# Patient Record
Sex: MALE | Race: OTHER
[De-identification: names, ages, dates, MRNs, and addresses within clinical notes are randomized per-mention and may not be internally consistent; named-entity substitution may affect disease eponyms.]

---

## 2017-05-11 ENCOUNTER — HOSPITAL ENCOUNTER (INPATIENT)
Dept: HOSPITAL 74 - JER | Age: 42
LOS: 11 days | Discharge: NURSING, REHAB OR LONG-TERM CARE | DRG: 710 | End: 2017-05-22
Attending: FAMILY MEDICINE | Admitting: FAMILY MEDICINE
Payer: COMMERCIAL

## 2017-05-11 VITALS — BODY MASS INDEX: 34.7 KG/M2

## 2017-05-11 DIAGNOSIS — Z71.3: ICD-10-CM

## 2017-05-11 DIAGNOSIS — G82.20: ICD-10-CM

## 2017-05-11 DIAGNOSIS — I10: ICD-10-CM

## 2017-05-11 DIAGNOSIS — E87.2: ICD-10-CM

## 2017-05-11 DIAGNOSIS — L89.154: ICD-10-CM

## 2017-05-11 DIAGNOSIS — E11.9: ICD-10-CM

## 2017-05-11 DIAGNOSIS — A41.9: Primary | ICD-10-CM

## 2017-05-11 DIAGNOSIS — E87.1: ICD-10-CM

## 2017-05-11 DIAGNOSIS — E66.9: ICD-10-CM

## 2017-05-11 DIAGNOSIS — M46.28: ICD-10-CM

## 2017-05-11 DIAGNOSIS — G82.50: ICD-10-CM

## 2017-05-11 DIAGNOSIS — M81.8: ICD-10-CM

## 2017-05-11 LAB
ALBUMIN SERPL-MCNC: 2.3 G/DL (ref 3.4–5)
ALP SERPL-CCNC: 122 U/L (ref 45–117)
ALT SERPL-CCNC: 8 U/L (ref 12–78)
ANION GAP SERPL CALC-SCNC: 15 MMOL/L (ref 8–16)
APPEARANCE UR: CLEAR
APTT BLD: 35.8 SECONDS (ref 26.9–34.4)
AST SERPL-CCNC: 28 U/L (ref 15–37)
BASOPHILS # BLD: 0 % (ref 0–2)
BILIRUB SERPL-MCNC: 1.1 MG/DL (ref 0.2–1)
BILIRUB UR STRIP.AUTO-MCNC: NEGATIVE MG/DL
CALCIUM SERPL-MCNC: 7.9 MG/DL (ref 8.5–10.1)
CO2 SERPL-SCNC: 22 MMOL/L (ref 21–32)
COCKROFT - GAULT: 151.11
COLOR UR: (no result)
CREAT SERPL-MCNC: 0.8 MG/DL (ref 0.7–1.3)
DEPRECATED RDW RBC AUTO: 15.4 % (ref 11.9–15.9)
EOSINOPHIL # BLD: 0 % (ref 0–4.5)
GLUCOSE SERPL-MCNC: 86 MG/DL (ref 74–106)
INR BLD: 1.43 (ref 0.82–1.09)
KETONES UR QL STRIP: NEGATIVE
LEUKOCYTE ESTERASE UR QL STRIP.AUTO: NEGATIVE
MCH RBC QN AUTO: 23.3 PG (ref 25.7–33.7)
MCHC RBC AUTO-ENTMCNC: 31.9 G/DL (ref 32–35.9)
MCV RBC: 73.1 FL (ref 80–96)
NEUTROPHILS # BLD: 59 % (ref 42.8–82.8)
NITRITE UR QL STRIP: NEGATIVE
PH BLDV: 7.38 [PH] (ref 7.32–7.42)
PH UR: 7 [PH] (ref 5–8)
PLATELET # BLD AUTO: 437 K/MM3 (ref 134–434)
PLATELET # BLD EST: ADEQUATE 10*3/UL
PMV BLD: 6.7 FL (ref 7.5–11.1)
PROT SERPL-MCNC: 7.1 G/DL (ref 6.4–8.2)
PROT UR QL STRIP: NEGATIVE
PROT UR QL STRIP: NEGATIVE
PT PNL PPP: 15.9 SEC (ref 9.98–11.88)
RBC # UR STRIP: NEGATIVE /UL
SAO2 % BLDV: 22 MEQ/L (ref 19–25)
SP GR UR: <= 1.005 (ref 1–1.02)
TROPONIN I SERPL-MCNC: < 0.02 NG/ML (ref 0–0.05)
UROBILINOGEN UR STRIP-MCNC: NEGATIVE E.U./DL (ref 0.2–1)
WBC # BLD AUTO: 41.4 K/MM3 (ref 4–10)

## 2017-05-11 PROCEDURE — G0480 DRUG TEST DEF 1-7 CLASSES: HCPCS

## 2017-05-11 PROCEDURE — C1751 CATH, INF, PER/CENT/MIDLINE: HCPCS

## 2017-05-11 PROCEDURE — A9503 TC99M MEDRONATE: HCPCS

## 2017-05-11 RX ADMIN — ACETAMINOPHEN PRN MG: 325 TABLET ORAL at 21:21

## 2017-05-11 RX ADMIN — VANCOMYCIN HYDROCHLORIDE SCH MLS/HR: 1 INJECTION, POWDER, LYOPHILIZED, FOR SOLUTION INTRAVENOUS at 18:41

## 2017-05-11 RX ADMIN — LEVOFLOXACIN ONE MLS/HR: 5 INJECTION, SOLUTION INTRAVENOUS at 15:23

## 2017-05-11 RX ADMIN — LEVOFLOXACIN ONE: 5 INJECTION, SOLUTION INTRAVENOUS at 15:47

## 2017-05-11 RX ADMIN — PIPERACILLIN SODIUM,TAZOBACTAM SODIUM SCH MLS/HR: 3; .375 INJECTION, POWDER, FOR SOLUTION INTRAVENOUS at 18:41

## 2017-05-11 RX ADMIN — SODIUM CHLORIDE SCH MLS/HR: 9 INJECTION, SOLUTION INTRAVENOUS at 18:42

## 2017-05-11 NOTE — PN
Teaching Attending Note


Name of Resident: Stacy Lawson


ATTENDING PHYSICIAN STATEMENT





I saw and evaluated the patient.


I reviewed the resident's note and discussed the case with the resident.


I agree with the resident's findings and plan as documented.








SUBJECTIVE:








OBJECTIVE:








ASSESSMENT AND PLAN:





Infected R ischial ulcer


Sepsis secondary to infected ulcer


Possible quinolone allergy





Await c/s


Empiric zosyn/ vancomycin


Surgical evaluation

## 2017-05-11 NOTE — EKG
Test Reason : 

Blood Pressure : ***/*** mmHG

Vent. Rate : 111 BPM     Atrial Rate : 111 BPM

   P-R Int : 138 ms          QRS Dur : 084 ms

    QT Int : 316 ms       P-R-T Axes : 051 117 046 degrees

   QTc Int : 429 ms

 

SINUS TACHYCARDIA

RIGHT AXIS DEVIATION

RIGHT VENTRICULAR HYPERTROPHY

NONSPECIFIC ST ABNORMALITY

ABNORMAL ECG

NO PREVIOUS ECGS AVAILABLE

Confirmed by GELA GALAVIZ, JESSICA ([id number]) on 5/11/2017 4:45:40 PM

 

Referred By:             Confirmed By:JESSICA REN MD

## 2017-05-11 NOTE — PDOC
History of Present Illness





<Hortencia Lanier - Last Filed: 05/11/17 15:25>





- History of Present Illness


Initial Comments: 


05/11/17 10:40


- History of Present Illness


Initial Comments: 





05/11/17 10:39


Patient is a 41 year old male with a quadriplegia, DM, and recurrent UTIs(

recent UTI 2 weeks ago) who presents to the ED sent in from [facility] with 

complaint of fever. As per NH, the patient was found to have a fever of 102.8 F 

and was given 650 mg of Tylenol at 08:45 AM. Patient presents to the ED with 

complaint of fever and is found to have 102.4 F in the ED. Patient reports 

nausea and occ dizziness as if the room is spinning. 


He denies chills, SOB, cp, vomiting, diarrhea, constipation or abdominal pain. 

He denies dysuria, frequency or urgency. 


PCP - Dr. Ramirez





<Hortencia Lanier - Last Filed: 05/11/17 10:39>





05/11/17 11:43








<Fariha Greene - Last Filed: 05/13/17 22:54>





- General


Chief Complaint: SIRS, Suspected/Possible


Stated Complaint: SEPSIS


Time Seen by Provider: 05/11/17 10:23





Past History





<Hortencia Lanier - Last Filed: 05/11/17 15:25>





- Past Medical History


Diabetes: Yes





- Psycho/Social/Smoking Cessation Hx


Anxiety: No


Suicidal Ideation: No


Smoking History: Never smoked


Have you smoked in the past 12 months: No


Information on smoking cessation initiated: No


Hx Alcohol Use: No


Drug/Substance Use Hx: No


Substance Use Type: None





<Fariha Greene - Last Filed: 05/13/17 22:54>





- Past Medical History


Allergies/Adverse Reactions: 


 Allergies











Allergy/AdvReac Type Severity Reaction Status Date / Time


 


levofloxacin Allergy Unknown  Verified 05/11/17 17:53











Home Medications: 


Ambulatory Orders





Acetaminophen [Tylenol] 650 mg PO Q6H PRN 05/11/17 


Alendronate Na [Fosamax] 70 mg PO WEEKLY 05/11/17 


Ammonium Lactate Lotion [Lac-Hydrin 12% Lotion -] 1 applic TP ASDIR 05/11/17 


Calcium 250Mg/Vit-D 125 Units [Oscal 250 mg+D -] 1 combo PO DAILY 05/11/17 











*Physical Exam





- Vital Signs


 Last Vital Signs











Temp Pulse Resp BP Pulse Ox


 


 102.4 F H  110 H  22   160/106   99 


 


 05/11/17 10:32  05/11/17 10:32  05/11/17 10:32  05/11/17 10:32  05/11/17 10:32














<Hortencia Lanier - Last Filed: 05/11/17 15:25>





- Vital Signs


 Last Vital Signs











Temp Pulse Resp BP Pulse Ox


 


 102.4 F H  110 H  22   160/106   99 


 


 05/11/17 10:32  05/11/17 10:32  05/11/17 10:32  05/11/17 10:32  05/11/17 10:32














- Physical Exam


Comments: 


05/11/17 10:37


Physical exam





 Last Vital Signs











Temp Pulse Resp BP Pulse Ox


 


 102.4 F H  110 H  22   160/106   99 


 


 05/11/17 10:32  05/11/17 10:32  05/11/17 10:32  05/11/17 10:32  05/11/17 10:32














GENERAL: The patient is awake, alert, and fully oriented, and in no


apparent distress.


HEAD: Normal with no signs of trauma.


EYES:  sclera anicteric, conjunctiva are normal.


ENT: Moist mucous membranes.


NECK: Normal range of motion, supple 


LUNGS: Breath sounds equal, clear to auscultation bilaterally.  No


wheezes, and no crackles.


HEART: Regular rate and rhythm, normal S1 and S2 without murmur, rub


or gallop.


ABDOMEN: Soft, nontender, normoactive bowel sounds.  No guarding, no


rebound.  No masses appreciated.


EXTREMITIES: dorsalis pedis pulses are present and full in the lower 

extremities bilaterally


NEUROLOGICAL: Quadriplegic, with some movement of his arms


PSYCH: Normal mood, normal affect.


SKIN: Warm, Dry, 





























<Fariha Greene - Last Filed: 05/13/17 22:54>





ED Treatment Course





- LABORATORY


CBC & Chemistry Diagram: 


 05/11/17 13:18





 05/11/17 11:16





<Hortencia Lanier - Last Filed: 05/11/17 15:25>





- LABORATORY


CBC & Chemistry Diagram: 


 05/13/17 06:00





 05/13/17 06:00





- RADIOLOGY


Radiology Studies Ordered: 














 Category Date Time Status


 


 CHEST X-RAY PORTABLE* [RAD] Stat Radiology  05/11/17 10:35 Ordered














<Fariha Greene - Last Filed: 05/13/17 22:54>





Medical Decision Making





- Medical Decision Making


05/11/17 15:03


Microblogged Dr. Mane of ID at 13:52 with no response. 


Dr. Mane was contacted again at 14:32 via microblog without any response. 


A call was placed to Dr. Mane at his service at 14:38, awaiting a call back.





05/11/17 15:25


Case discussed with Dr. Vyas.





<Hortencia Lanier - Last Filed: 05/11/17 15:25>





- Medical Decision Making


05/11/17 10:39





Fever/sepsis of uncertain etiology, although 2 weeks ago he reportedly had a 

urinary tract infection





05/11/17 11:09


EKG


Sinus tachycardia, with a rate of 111


Right axis deviation, 117


Normal AV and IV conduction time


Normal QTC


Diffuse nonspecific ST-T wave abnormalities are noted





There is no old EKG available for comparison at this time





05/11/17 13:48


 Laboratory Results - last 24 hr











  05/11/17 05/11/17 05/11/17





  11:16 11:16 11:16


 


WBC  Cancelled  


 


Corrected WBC (auto)  Cancelled  


 


RBC  Cancelled  


 


Hgb  Cancelled  


 


Hct  Cancelled  


 


MCV  Cancelled  


 


MCHC  Cancelled  


 


RDW  Cancelled  


 


Plt Count  Cancelled  


 


MPV  Cancelled  


 


Neutrophils %  Cancelled  


 


Lymphocytes %  Cancelled  


 


Monocytes %  Cancelled  


 


Eosinophils %  Cancelled  


 


Basophils %  Cancelled  


 


Differential Comment  Cancelled  


 


Smudge Cells  Cancelled  


 


Platelet Estimate  Cancelled  


 


Platelet Comment  Cancelled  


 


RBC Morphology  Cancelled  


 


INR   1.43 H 


 


PTT (Actin FS)   35.8 H 


 


VBG pH   


 


POC VBG pCO2   


 


POC VBG pO2   


 


Mixed VBG HCO3   


 


Sodium    129 L


 


Potassium    4.2


 


Chloride    92 L


 


Carbon Dioxide    22


 


Anion Gap    15


 


BUN    7


 


Creatinine    0.8


 


Creat Clearance w eGFR    > 60


 


Random Glucose    86


 


Lactic Acid   


 


Calcium    7.9 L


 


Total Bilirubin    1.1 H


 


AST    28


 


ALT    8 L


 


Alkaline Phosphatase    122 H


 


Creatine Kinase    157


 


Creatine Kinase Index    < 0.7


 


CK-MB (CK-2)    < 1.000


 


CK-MB (CK-2) Rel Index   


 


Troponin I    < 0.02


 


Total Protein    7.1


 


Albumin    2.3 L


 


Blood Type   


 


Antibody Screen   














  05/11/17 05/11/17 05/11/17





  11:16 11:16 11:16


 


WBC   


 


Corrected WBC (auto)   


 


RBC   


 


Hgb   


 


Hct   


 


MCV   


 


MCHC   


 


RDW   


 


Plt Count   


 


MPV   


 


Neutrophils %   


 


Lymphocytes %   


 


Monocytes %   


 


Eosinophils %   


 


Basophils %   


 


Differential Comment   


 


Smudge Cells   


 


Platelet Estimate   


 


Platelet Comment   


 


RBC Morphology   


 


INR   


 


PTT (Actin FS)   


 


VBG pH   


 


POC VBG pCO2   


 


POC VBG pO2   


 


Mixed VBG HCO3   


 


Sodium   


 


Potassium   


 


Chloride   


 


Carbon Dioxide   


 


Anion Gap   


 


BUN   


 


Creatinine   


 


Creat Clearance w eGFR   


 


Random Glucose   


 


Lactic Acid  4.528 H*  


 


Calcium   


 


Total Bilirubin   


 


AST   


 


ALT   


 


Alkaline Phosphatase   


 


Creatine Kinase   


 


Creatine Kinase Index   


 


CK-MB (CK-2)   


 


CK-MB (CK-2) Rel Index    Cancelled


 


Troponin I   


 


Total Protein   


 


Albumin   


 


Blood Type   O POSITIVE 


 


Antibody Screen   Negative 














  05/11/17 05/11/17





  12:45 13:18


 


WBC   41.4 H*


 


Corrected WBC (auto)  


 


RBC   4.42


 


Hgb   10.3 L


 


Hct   32.3 L


 


MCV   73.1 L


 


MCHC   31.9 L


 


RDW   15.4


 


Plt Count   437 H


 


MPV   6.7 L


 


Neutrophils %   Y


 


Lymphocytes %   Y


 


Monocytes %  


 


Eosinophils %  


 


Basophils %  


 


Differential Comment  


 


Smudge Cells  


 


Platelet Estimate  


 


Platelet Comment  


 


RBC Morphology  


 


INR  


 


PTT (Actin FS)  


 


VBG pH  7.38 


 


POC VBG pCO2  37.8 L 


 


POC VBG pO2  41.5 


 


Mixed VBG HCO3  22.0 


 


Sodium  


 


Potassium  


 


Chloride  


 


Carbon Dioxide  


 


Anion Gap  


 


BUN  


 


Creatinine  


 


Creat Clearance w eGFR  


 


Random Glucose  


 


Lactic Acid  


 


Calcium  


 


Total Bilirubin  


 


AST  


 


ALT  


 


Alkaline Phosphatase  


 


Creatine Kinase  


 


Creatine Kinase Index  


 


CK-MB (CK-2)  


 


CK-MB (CK-2) Rel Index  


 


Troponin I  


 


Total Protein  


 


Albumin  


 


Blood Type  


 


Antibody Screen  














UA pending-straight catheter for UA-had recent UTI 


I do not have any recent culture results





Will start with Levaquin and Zosyn-case discussed with Dr. Ramirez-will admit


Will consult Dr. Conde ID








Repeat Lactic acid 3.7 (decreasing with hydration)





05/11/17 15:00


Catheter UA-negative











05/11/17 15:28


Case and all results discussed with Dr. Vyas-agrees with antibiotic 

coverage to start


Will also check CT scan of the abdomen, to make sure no intra-abdominal source





05/11/17 15:41


Patient had an ALLERGIC reaction to Levaquin as soon as it started going in (he 

has no history of any prior ALLERGIES)


He was given 50 of Benadryl and Solu-Medrol 125 mg and is starting to improve





The patient was turned, and there is a decubitus on his right posterior hip 

area (Ischeal area), which is draining


There is another healing decubitus which is not draining. wound culture done





Levaquin stopped


Patient will start the vancomycin and Zosyn








ID paged again





05/11/17 15:45


ID at bedside








CT abd/pelv essentially neg for acute pathology








<Fariha Greene - Last Filed: 05/13/17 22:54>





*DC/Admit/Observation/Transfer





<Hortencia Lanier - Last Filed: 05/11/17 15:25>





- Discharge Dispostion


Admit: Yes





<Fariha Greene - Last Filed: 05/13/17 22:54>


Diagnosis at time of Disposition: 


 Sepsis





- Referrals
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
## 2017-05-12 LAB
ALBUMIN SERPL-MCNC: 2.2 G/DL (ref 3.4–5)
ALP SERPL-CCNC: 99 U/L (ref 45–117)
ALT SERPL-CCNC: 7 U/L (ref 12–78)
ANION GAP SERPL CALC-SCNC: 11 MMOL/L (ref 8–16)
AST SERPL-CCNC: 15 U/L (ref 15–37)
BILIRUB SERPL-MCNC: 0.4 MG/DL (ref 0.2–1)
CALCIUM SERPL-MCNC: 8.1 MG/DL (ref 8.5–10.1)
CO2 SERPL-SCNC: 26 MMOL/L (ref 21–32)
COCKROFT - GAULT: 311.78
CREAT SERPL-MCNC: 0.4 MG/DL (ref 0.7–1.3)
DEPRECATED RDW RBC AUTO: 15.7 % (ref 11.9–15.9)
GLUCOSE SERPL-MCNC: 161 MG/DL (ref 74–106)
MCH RBC QN AUTO: 22.9 PG (ref 25.7–33.7)
MCHC RBC AUTO-ENTMCNC: 31.5 G/DL (ref 32–35.9)
MCV RBC: 72.5 FL (ref 80–96)
NEUTROPHILS # BLD: 83 % (ref 42.8–82.8)
PLATELET # BLD AUTO: 446 K/MM3 (ref 134–434)
PLATELET # BLD EST: (no result) 10*3/UL
PMV BLD: 6.5 FL (ref 7.5–11.1)
PROT SERPL-MCNC: 6.8 G/DL (ref 6.4–8.2)
WBC # BLD AUTO: 22.3 K/MM3 (ref 4–10)

## 2017-05-12 RX ADMIN — SODIUM CHLORIDE SCH MLS/HR: 9 INJECTION, SOLUTION INTRAVENOUS at 16:53

## 2017-05-12 RX ADMIN — PIPERACILLIN SODIUM,TAZOBACTAM SODIUM SCH MLS/HR: 3; .375 INJECTION, POWDER, FOR SOLUTION INTRAVENOUS at 10:31

## 2017-05-12 RX ADMIN — PIPERACILLIN SODIUM,TAZOBACTAM SODIUM SCH MLS/HR: 3; .375 INJECTION, POWDER, FOR SOLUTION INTRAVENOUS at 16:59

## 2017-05-12 RX ADMIN — VANCOMYCIN HYDROCHLORIDE SCH MLS/HR: 1 INJECTION, POWDER, LYOPHILIZED, FOR SOLUTION INTRAVENOUS at 05:16

## 2017-05-12 RX ADMIN — PIPERACILLIN SODIUM,TAZOBACTAM SODIUM SCH MLS/HR: 3; .375 INJECTION, POWDER, FOR SOLUTION INTRAVENOUS at 01:17

## 2017-05-12 RX ADMIN — VANCOMYCIN HYDROCHLORIDE SCH MLS/HR: 1 INJECTION, POWDER, LYOPHILIZED, FOR SOLUTION INTRAVENOUS at 16:59

## 2017-05-12 NOTE — PN
Teaching Attending Note


Name of Resident: Stacy Lawson


ATTENDING PHYSICIAN STATEMENT





I saw and evaluated the patient.


I reviewed the resident's note and discussed the case with the resident.


I agree with the resident's findings and plan as documented.








SUBJECTIVE:








OBJECTIVE:








ASSESSMENT AND PLAN:





Sepsis


Infected R ischial ulcer


Fever/ leukocytosis-improved





Pending c/s, empiric zosyn/ vancomycin


Surgical evaluation

## 2017-05-12 NOTE — CONSULT
Consult


Consult Specialty:: Surgery


Reason for Consultation:: Right ischial/sacral wound





- History of Present Illness


History of Present Illness: 


41 male with PMHx of paraplegia sent to ER from Nursing facility for fevers


Noted to have elevated WBC and Right ischial wound


States he has had this chronic wound for quite some time without healing








- History Source


History Provided By: Patient, Medical Record





- Past Medical History


CNS: Yes: Other (quadriplegia)


Renal/: Yes: UTI


Musculoskeletal: Yes: Other (osteoporosis)





- Alcohol/Substance Use


Hx Alcohol Use: No





- Smoking History


Smoking history: Never smoked


Have you smoked in the past 12 months: No





Home Medications





- Allergies


Allergies/Adverse Reactions: 


 Allergies











Allergy/AdvReac Type Severity Reaction Status Date / Time


 


levofloxacin Allergy Unknown  Verified 05/11/17 17:53














- Home Medications


Home Medications: 


Ambulatory Orders





Acetaminophen [Tylenol] 650 mg PO Q6H PRN 05/11/17 


Alendronate Na [Fosamax] 70 mg PO WEEKLY 05/11/17 


Ammonium Lactate Lotion [Lac-Hydrin 12% Lotion -] 1 applic TP ASDIR 05/11/17 


Calcium 250Mg/Vit-D 125 Units [Oscal 250 mg+D -] 1 combo PO DAILY 05/11/17 











Family Disease History





- Family Disease History


Family History: Unremarkable





Review of Systems





- Review of Systems


Constitutional: reports: Fever


Cardiovascular: denies: Chest Pain


Respiratory: denies: Cough


Gastrointestinal: denies: Abdominal Pain


Pain Intensity: 0





Physical Exam


Vital Signs: 


 Vital Signs











Temperature  98.2 F   05/12/17 14:00


 


Pulse Rate  82   05/12/17 14:00


 


Respiratory Rate  18   05/12/17 14:00


 


Blood Pressure  101/56   05/12/17 14:00


 


O2 Sat by Pulse Oximetry (%)  98   05/12/17 10:00











Constitutional: Yes: Calm


Neck: Yes: Supple


Cardiovascular: Yes: WNL


Respiratory: Yes: Regular


Gastrointestinal: Yes: Soft.  No: Distention, Tenderness


Integumentary: Yes: Other (Right ischium/sacrum- Chronic ulcer. Unable to 

determine total size, however the opening is approximatelt 2cm x 2cm. 

Underlying bone palpable. + Reddish, foul smelling discharge. No erythema. 

Chronic skin changes.)


Neurological: Yes: Alert, Oriented


Labs: 


 CBC, BMP





 05/12/17 10:15 





 05/12/17 10:15 











Imaging





- Results


Cat Scan: Report Reviewed, Image Reviewed





Problem List





- Problems


(1) Paraplegia following spinal cord injury


Code(s): G82.20 - PARAPLEGIA, UNSPECIFIED





(2) Decubitus ulcer of right ischial area


Code(s): L89.319 - PRESSURE ULCER OF RIGHT BUTTOCK, UNSPECIFIED STAGE   

Qualifiers: 


     Pressure ulcer stage: unstageable        Qualified Code(s): L89.310 - 

Pressure ulcer of right buttock, unstageable  








Assessment/Plan


41 male with unstageable right ischial chronic ulcer


+ Drainage


Ulcer is chronic and extends to the bone


Due to complexity of the wound would recommend plastic surgery evaluation 


May require extensive debridement and possible reconstruction to fully heal the 

ulcer


Antibiotics
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
## 2017-05-12 NOTE — CONSULT
Consult


Consult Specialty:: Nephrology


Reason for Consultation:: lactic acidosis and hyponatremia





- History of Present Illness


Chief Complaint: sent in from NH with fever


History of Present Illness: 


Pt is a 41 year old male with pmhx of DM, quadriplegia, and recurrent UTI who 

was sent in from the NH for fever. He was found to have a fever of 102.8. He 

was also febrile in the ER. He was found to have elevated lactic acid and was 

also found to be hyponatremic so I was called to evaluate him. He was admitted 

for sepsis and treated with antibiotics. The sodium improved with saline. He 

says he feels much better today. He is tolerating diet. He denies chills. He 

denies dysuria or hematuria. 





- History Source


History Provided By: Patient, Medical Record





- Past Medical History


CNS: Yes: Other (quadriplegia)


Renal/: Yes: UTI


Musculoskeletal: Yes: Other (osteoporosis)





- Alcohol/Substance Use


Hx Alcohol Use: No





- Smoking History


Smoking history: Never smoked


Have you smoked in the past 12 months: No





Home Medications





- Allergies


Allergies/Adverse Reactions: 


 Allergies











Allergy/AdvReac Type Severity Reaction Status Date / Time


 


levofloxacin Allergy Unknown  Verified 05/11/17 17:53














- Home Medications


Home Medications: 


Ambulatory Orders





Acetaminophen [Tylenol] 650 mg PO Q6H PRN 05/11/17 


Alendronate Na [Fosamax] 70 mg PO WEEKLY 05/11/17 


Ammonium Lactate Lotion [Lac-Hydrin 12% Lotion -] 1 applic TP ASDIR 05/11/17 


Calcium 250Mg/Vit-D 125 Units [Oscal 250 mg+D -] 1 combo PO DAILY 05/11/17 











Family Disease History





- Family Disease History


Family History: Denies





Review of Systems





- Review of Systems


Constitutional: reports: Chills, Fever


Eyes: reports: No Symptoms


HENT: reports: No Symptoms


Neck: reports: No Symptoms


Cardiovascular: reports: No Symptoms


Respiratory: reports: No Symptoms


Gastrointestinal: reports: No Symptoms


Genitourinary: reports: No Symptoms


Integumentary: reports: No Symptoms


Neurological: reports: Pre-Existing Deficit





Physical Exam


Vital Signs: 


 Vital Signs











Temperature  98.2 F   05/12/17 14:00


 


Pulse Rate  82   05/12/17 14:00


 


Respiratory Rate  18   05/12/17 14:00


 


Blood Pressure  101/56   05/12/17 14:00


 


O2 Sat by Pulse Oximetry (%)  98   05/12/17 10:00











Constitutional: Yes: Calm


Eyes: Yes: Conjunctiva Clear


HENT: Yes: Atraumatic


Neck: Yes: Supple


Cardiovascular: Yes: S1, S2


Respiratory: Yes: CTA Bilaterally


Gastrointestinal: Yes: Soft, Abdomen, Obese


Renal/: Yes: Incontinence


Musculoskeletal: Yes: Muscle Weakness


Edema: No


Neurological: Yes: Oriented, Pre-Existing Deficit


Psychiatric: Yes: Oriented


Labs: 


 CBC, BMP





 05/12/17 10:15 





 05/12/17 10:15 





 Laboratory Tests











  05/11/17 05/11/17 05/11/17





  11:16 11:16 12:45


 


WBC   


 


Hgb   


 


Plt Count   


 


VBG pH    7.38


 


POC VBG pCO2    37.8 L


 


POC VBG pO2    41.5


 


Mixed VBG HCO3    22.0


 


Sodium  129 L  


 


Potassium  4.2  


 


Chloride   


 


Carbon Dioxide  22  


 


Anion Gap   


 


BUN   


 


Creatinine   


 


Creat Clearance w eGFR   


 


Lactic Acid   4.528 H* 


 


Creatine Kinase  157  


 


Albumin  2.3 L  














  05/11/17 05/11/17 05/12/17





  13:18 13:42 10:15


 


WBC  41.4 H*   22.3 H D


 


Hgb    10.5 L


 


Plt Count    446 H


 


VBG pH   


 


POC VBG pCO2   


 


POC VBG pO2   


 


Mixed VBG HCO3   


 


Sodium   


 


Potassium   


 


Chloride   


 


Carbon Dioxide   


 


Anion Gap   


 


BUN   


 


Creatinine   


 


Creat Clearance w eGFR   


 


Lactic Acid   3.730 H* 


 


Creatine Kinase   


 


Albumin   














  05/12/17





  10:15


 


WBC 


 


Hgb 


 


Plt Count 


 


VBG pH 


 


POC VBG pCO2 


 


POC VBG pO2 


 


Mixed VBG HCO3 


 


Sodium  140


 


Potassium  3.7


 


Chloride  103  D


 


Carbon Dioxide  26


 


Anion Gap  11


 


BUN  4 L D


 


Creatinine  0.4 L D


 


Creat Clearance w eGFR  > 60


 


Lactic Acid 


 


Creatine Kinase 


 


Albumin 














Imaging





- Results


Chest X-ray: Report Reviewed





Problem List





- Problems


(1) Hypertension


Code(s): I10 - ESSENTIAL (PRIMARY) HYPERTENSION   





(2) Paraplegia following spinal cord injury


Code(s): G82.20 - PARAPLEGIA, UNSPECIFIED





(3) Sepsis


Code(s): A41.9 - SEPSIS, UNSPECIFIED ORGANISM   





(4) Lactic acid acidosis


Code(s): E87.2 - ACIDOSIS





(5) Hyponatremia


Code(s): E87.1 - HYPO-OSMOLALITY AND HYPONATREMIA








Assessment/Plan


 Current Medications











Generic Name Dose Route Start Last Admin





  Trade Name Freq  PRN Reason Stop Dose Admin


 


Acetaminophen  650 mg 05/11/17 15:44 05/11/17 21:21





  Tylenol -  PO   650 mg





  Q6H PRN   Administration





  FEVER OR PAIN   


 


Sodium Chloride  1,000 mls @ 83 mls/hr 05/11/17 15:45 05/12/17 16:53





  Normal Saline -  IV   83 mls/hr





  ASDIR SUN   Administration


 


Vancomycin HCl  250 mls @ 166.667 mls/hr 05/11/17 18:00 05/12/17 16:59





  Vancomycin (Pre-Docked)  IVPB   166.667 mls/hr





  Q12H SUN   Administration


 


Piperacillin Sod/Tazobactam Sod  50 mls @ 100 mls/hr 05/11/17 18:00 05/12/17 16:

59





  Zosyn 3.375gm Ivpb (Pre-Docked)  IVPB   100 mls/hr





  Q8H-IV SUN   Administration





  Protocol   








Impression


1. hyponatremia


2. lactic acidosis secondary to sepsis


3. sepsis


4. quadraplegia





Plan


- sodium is improved with fluids


- cont with saline


- repeat labs in am


- follow up blood, urine and wound cultures


- cont abx


- ID input appreciated


- metabolic derangements likely from sepsis


- wbc is improved


- will follow


Dr Mendoza

## 2017-05-12 NOTE — HP
Admitting History and Physical





- Primary Care Physician


PCP: Soumya Ramirez





- Admission


Chief Complaint: FEVER/LEUKOCYTOSIS/SACRAL ULCER


History of Present Illness: 


Patient is a 41 year old male with a quadriplegia, DM, and recurrent UTIs(

recent UTI 2 weeks ago) who presents to the ED sent in from [facility] with 

complaint of fever. As per NH, the patient was found to have a fever of 102.8 F 

and was given 650 mg of Tylenol at 08:45 AM. Patient presents to the ED with 

complaint of fever and is found to have 102.4 F in the ED. Patient reports 

nausea and occ dizziness as if the room is spinning. 


He denies chills, SOB, cp, vomiting, diarrhea, constipation or abdominal pain. 

He denies dysuria, frequency or urgency. 


History Source: Patient





- Advance Directives


Advance Directives: Yes: Health Care Proxy





- Smoking History


Smoking history: Never smoked


Have you smoked in the past 12 months: No





- Alcohol/Substance Use


Hx Alcohol Use: No





Home Medications





- Allergies


Allergies/Adverse Reactions: 


 Allergies











Allergy/AdvReac Type Severity Reaction Status Date / Time


 


levofloxacin Allergy Unknown  Verified 05/11/17 17:53














- Home Medications


Home Medications: 


Ambulatory Orders





Acetaminophen [Tylenol] 650 mg PO Q6H PRN 05/11/17 


Alendronate Na [Fosamax] 70 mg PO WEEKLY 05/11/17 


Ammonium Lactate Lotion [Lac-Hydrin 12% Lotion -] 1 applic TP ASDIR 05/11/17 


Calcium 250Mg/Vit-D 125 Units [Oscal 250 mg+D -] 1 combo PO DAILY 05/11/17 











Review of Systems





- Review of Systems


Constitutional: reports: Other


Eyes: reports: No Symptoms


HENT: reports: No Symptoms


Neck: reports: No Symptoms


Cardiovascular: reports: No Symptoms


Respiratory: reports: No Symptoms


Gastrointestinal: reports: No Symptoms


Genitourinary: reports: Incontinence


Musculoskeletal: reports: Muscle Weakness


Integumentary: reports: No Symptoms


Neurological: reports: Parasthesia, Pre-Existing Deficit, Weakness, Other


Endocrine: reports: No Symptoms


Hematology/Lymphatic: reports: No Symptoms


Psychiatric: reports: No Symptoms





Physical Examination


Vital Signs: 


 Vital Signs











Temperature  98.2 F   05/12/17 14:00


 


Pulse Rate  82   05/12/17 14:00


 


Respiratory Rate  18   05/12/17 14:00


 


Blood Pressure  101/56   05/12/17 14:00


 


O2 Sat by Pulse Oximetry (%)  98   05/12/17 10:00











Constitutional: Yes: Mild Distress


Eyes: Yes: WNL


HENT: Yes: WNL


Neck: Yes: WNL


Cardiovascular: Yes: WNL


Respiratory: Yes: WNL


Gastrointestinal: Yes: WNL


Renal/: Yes: WNL, Incontinence


Musculoskeletal: Yes: Muscle Weakness


Extremities: Yes: Deformity


Edema: No


Peripheral Pulses WNL: Yes


Integumentary: Yes: WNL, Pressure Ulcer, Other (SACRAL STAGE 4 ULCER)


Wound/Incision: Yes: Dressing Dry and Intact


Neurological: Yes: Pre-Existing Deficit, Weakness (PARAPLEGIA)


...Motor Strength: LLE, RLE


Psychiatric: Yes: Other


Labs: 


 CBC, BMP





 05/12/17 10:15 





 05/12/17 10:15 











Problem List





- Problems


(1) Paraplegia following spinal cord injury


Assessment/Plan: 


CHRONIC AFTER SPINAL INJURY


Code(s): G82.20 - PARAPLEGIA, UNSPECIFIED





(2) Decubitus ulcer


Code(s): L89.90 - PRESSURE ULCER OF UNSPECIFIED SITE, UNSPECIFIED STAGE   

Qualifiers: 


     Pressure ulcer location: sacral region     Pressure ulcer stage: stage 4  

      Qualified Code(s): L89.154 - Pressure ulcer of sacral region, stage 4  





(3) Diabetes


Code(s): E11.9 - TYPE 2 DIABETES MELLITUS WITHOUT COMPLICATIONS   Qualifiers: 


     Diabetes mellitus type: type 2     Diabetes mellitus complication status: 

with unspecified complications





(4) Hypertension


Code(s): I10 - ESSENTIAL (PRIMARY) HYPERTENSION   





(5) Sepsis


Code(s): A41.9 - SEPSIS, UNSPECIFIED ORGANISM   








Assessment/Plan


SACRAL ULCER STAGE 3-4 WILL NEED SURGICAL DEBRIDEMENT


IV ABX


WOUND CARE


LABS CULTURES PENDING


ADA


SSI

## 2017-05-12 NOTE — PN
Physical Exam: 


SUBJECTIVE: Patient seen and examined, afebrile, white count trending down, 

denies pain or associated symptoms. 








OBJECTIVE:





 Vital Signs











 Period  Temp  Pulse  Resp  BP Sys/L[place]ola  Pulse Ox


 


 Last 24 Hr  98.4 F-100.2 F    18-22  100-145/61-89  96-98











GENERAL: The patient is quadraplegic awake, alert, and fully oriented, in no 

acute distress.


HEAD: Normal with no signs of trauma.


EYES: PERRL, extraocular movements intact, sclera anicteric, conjunctiva clear. 

No ptosis. 


NECK: thyromegaly


LUNGS: decreased Breath sounds 


HEART: Regular rate and rhythm, S1, S2 without murmur, rub or gallop.


ABDOMEN: obese Soft, nontender, normoactive bowel sounds, no guarding, no 


rebound, no hepatosplenomegaly, no masses.


EXTREMITIES: 2+ pulses, warm, well-perfused, no edema. bilateral muscle atrophy 

not able to move legs; minimal arm movement


NEUROLOGICAL: Cranial nerves II through XII grossly intact. Normal speech, gait 

not 


observed. 


PSYCH: Normal mood, normal affect.


SKIN: Warm, dry, normal turgor, no rashes or lesions noted














 


 CBC, BMP





 05/12/17 10:15 








Active Medications











Generic Name Dose Route Start Last Admin





  Trade Name Freq  PRN Reason Stop Dose Admin


 


Acetaminophen  650 mg 05/11/17 15:44 05/11/17 21:21





  Tylenol -  PO   650 mg





  Q6H PRN   Administration





  FEVER OR PAIN   


 


Sodium Chloride  1,000 mls @ 83 mls/hr 05/11/17 15:45 05/11/17 18:42





  Normal Saline -  IV   83 mls/hr





  ASDIR SUN   Administration


 


Vancomycin HCl  250 mls @ 166.667 mls/hr 05/11/17 18:00 05/12/17 05:16





  Vancomycin (Pre-Docked)  IVPB   166.667 mls/hr





  Q12H SUN   Administration


 


Piperacillin Sod/Tazobactam Sod  50 mls @ 100 mls/hr 05/11/17 18:00 05/12/17 10:

31





  Zosyn 3.375gm Ivpb (Pre-Docked)  IVPB   100 mls/hr





  Q8H-IV SUN   Administration





  Protocol   











ASSESSMENT/PLAN:


This is a 41 year old male with PMHx DM, HTN, quadgraplegic from [facility] 17 years 

ago; from [facility], presented with fever and wbc 41; septic secondary to 

decubitus ulcer. 








#Sepsis secondary to right sacral ulcer:


-sepsis protocol started


-afebrile, white count coming down


-Start IV vancomycin and IV zosyn Day @


-blood and wound cultures pending


-Tylenol prn fever


-will need surgery consult








Dispo: We will continue to follow the patient. Thank you for this consultative 

opportunity.





Problem List





- Problems


(1) Sepsis


Code(s): A41.9 - SEPSIS, UNSPECIFIED ORGANISM   





(2) Decubitus ulcer


Code(s): L89.90 - PRESSURE ULCER OF UNSPECIFIED SITE, UNSPECIFIED STAGE   





(3) Diabetes


Code(s): E11.9 - TYPE 2 DIABETES MELLITUS WITHOUT COMPLICATIONS   





(4) Hypertension


Code(s): I10 - ESSENTIAL (PRIMARY) HYPERTENSION   








Visit type





- Emergency Visit


Emergency Visit: Yes


ED Registration Date: 05/11/17


Care time: The patient presented to the Emergency Department on the above date 

and was hospitalized for further evaluation of their emergent condition.





- New Patient


This patient is new to me today: No





- Critical Care


Critical Care patient: No

## 2017-05-13 LAB
ANION GAP SERPL CALC-SCNC: 13 MMOL/L (ref 8–16)
BASOPHILS # BLD: 0.3 % (ref 0–2)
CALCIUM SERPL-MCNC: 7.6 MG/DL (ref 8.5–10.1)
CO2 SERPL-SCNC: 26 MMOL/L (ref 21–32)
COCKROFT - GAULT: 311.78
CREAT SERPL-MCNC: 0.4 MG/DL (ref 0.7–1.3)
DEPRECATED RDW RBC AUTO: 15.8 % (ref 11.9–15.9)
EOSINOPHIL # BLD: 0.1 % (ref 0–4.5)
GLUCOSE SERPL-MCNC: 110 MG/DL (ref 74–106)
MCH RBC QN AUTO: 23.6 PG (ref 25.7–33.7)
MCHC RBC AUTO-ENTMCNC: 31.9 G/DL (ref 32–35.9)
MCV RBC: 74 FL (ref 80–96)
NEUTROPHILS # BLD: 75.2 % (ref 42.8–82.8)
PLATELET # BLD AUTO: 460 K/MM3 (ref 134–434)
PMV BLD: 6.7 FL (ref 7.5–11.1)
WBC # BLD AUTO: 15.5 K/MM3 (ref 4–10)

## 2017-05-13 RX ADMIN — SODIUM CHLORIDE SCH MLS/HR: 9 INJECTION, SOLUTION INTRAVENOUS at 17:42

## 2017-05-13 RX ADMIN — GABAPENTIN SCH MG: 300 CAPSULE ORAL at 20:59

## 2017-05-13 RX ADMIN — PIPERACILLIN SODIUM,TAZOBACTAM SODIUM SCH MLS/HR: 3; .375 INJECTION, POWDER, FOR SOLUTION INTRAVENOUS at 02:50

## 2017-05-13 RX ADMIN — PIPERACILLIN SODIUM,TAZOBACTAM SODIUM SCH MLS/HR: 3; .375 INJECTION, POWDER, FOR SOLUTION INTRAVENOUS at 10:33

## 2017-05-13 RX ADMIN — BACLOFEN SCH MG: 10 TABLET ORAL at 20:59

## 2017-05-13 RX ADMIN — ACETAMINOPHEN PRN MG: 325 TABLET ORAL at 20:59

## 2017-05-13 RX ADMIN — PIPERACILLIN SODIUM,TAZOBACTAM SODIUM SCH MLS/HR: 3; .375 INJECTION, POWDER, FOR SOLUTION INTRAVENOUS at 17:46

## 2017-05-13 RX ADMIN — SODIUM HYPOCHLORITE SCH APPLIC: 2.5 SOLUTION TOPICAL at 23:00

## 2017-05-13 RX ADMIN — SENNOSIDES SCH TAB: 8.6 TABLET, FILM COATED ORAL at 20:59

## 2017-05-13 RX ADMIN — METFORMIN HYDROCHLORIDE SCH MG: 500 TABLET ORAL at 17:42

## 2017-05-13 RX ADMIN — VANCOMYCIN HYDROCHLORIDE SCH MLS/HR: 1 INJECTION, POWDER, LYOPHILIZED, FOR SOLUTION INTRAVENOUS at 05:44

## 2017-05-13 NOTE — PN
Progress Note, Physician


History of Present Illness: 


Pt seen and examined at bedside. He is awake and alert. He denies fevers or 

chills. 





- Current Medication List


Current Medications: 


Active Medications





Acetaminophen (Tylenol -)  650 mg PO Q6H PRN


   PRN Reason: FEVER OR PAIN


   Last Admin: 05/11/17 21:21 Dose:  650 mg


Sodium Chloride (Normal Saline -)  1,000 mls @ 83 mls/hr IV ASDIR SUN


   Last Admin: 05/12/17 16:53 Dose:  83 mls/hr


Piperacillin Sod/Tazobactam Sod (Zosyn 3.375gm Ivpb (Pre-Docked))  50 mls @ 100 

mls/hr IVPB Q8H-IV SUN


   PRN Reason: Protocol


   Last Admin: 05/13/17 10:33 Dose:  100 mls/hr











- Objective


Vital Signs: 


 Vital Signs











Temperature  98.2 F   05/13/17 06:00


 


Pulse Rate  76   05/13/17 06:00


 


Respiratory Rate  20   05/13/17 06:00


 


Blood Pressure  110/55   05/13/17 06:00


 


O2 Sat by Pulse Oximetry (%)  98   05/12/17 21:00











Constitutional: Yes: Calm


Eyes: Yes: Conjunctiva Clear


HENT: Yes: Atraumatic


Cardiovascular: Yes: S1, S2


Respiratory: Yes: CTA Bilaterally


Genitourinary: Yes: Incontinence


Edema: No


Integumentary: Yes: Pressure Ulcer


Neurological: Yes: Oriented


Psychiatric: Yes: Oriented


Labs: 


 CBC, BMP





 05/13/17 06:00 





 05/13/17 06:00 





 INR, PTT











INR  1.43  (0.82-1.09)  H  05/11/17  11:16    














Problem List





- Problems


(1) Hypertension


Code(s): I10 - ESSENTIAL (PRIMARY) HYPERTENSION   





(2) Paraplegia following spinal cord injury


Code(s): G82.20 - PARAPLEGIA, UNSPECIFIED





(3) Sepsis


Code(s): A41.9 - SEPSIS, UNSPECIFIED ORGANISM   





(4) Lactic acid acidosis


Code(s): E87.2 - ACIDOSIS





(5) Hyponatremia


Code(s): E87.1 - HYPO-OSMOLALITY AND HYPONATREMIA








Assessment/Plan


 Current Medications











Generic Name Dose Route Start Last Admin





  Trade Name Freq  PRN Reason Stop Dose Admin


 


Acetaminophen  650 mg 05/11/17 15:44 05/11/17 21:21





  Tylenol -  PO   650 mg





  Q6H PRN   Administration





  FEVER OR PAIN   


 


Sodium Chloride  1,000 mls @ 83 mls/hr 05/11/17 15:45 05/12/17 16:53





  Normal Saline -  IV   83 mls/hr





  ASDIR SUN   Administration


 


Piperacillin Sod/Tazobactam Sod  50 mls @ 100 mls/hr 05/11/17 18:00 05/13/17 10:

33





  Zosyn 3.375gm Ivpb (Pre-Docked)  IVPB   100 mls/hr





  Q8H-IV SUN   Administration





  Protocol   











Impression


1. hyponatremia


2. lactic acidosis secondary to sepsis


3. sepsis


4. quadraplegia


5. sacral decub





Plan


- repeat lactic acid level


- renal function stable


- wbc is improving


- abx per ID


- follow cultures


- decrease rate of fluids


- will follow PRN


- metabolic derangements likely from sepsis





Dr Mendoza
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
## 2017-05-13 NOTE — PN
Progress Note, Physician


Chief Complaint: 


I CALLED DWIGHT SCHWARTZ TO REVIEW MEDICATION LIST


PATIENT AWAKE


ALERT


+APPETITE


+BM





- Current Medication List


Current Medications: 


Active Medications





Acetaminophen (Tylenol -)  650 mg PO Q6H PRN


   PRN Reason: FEVER OR PAIN


   Last Admin: 05/11/17 21:21 Dose:  650 mg


Baclofen (Lioresal -)  10 mg PO BID SUN


Gabapentin (Neurontin -)  300 mg PO BID SUN


Piperacillin Sod/Tazobactam Sod (Zosyn 3.375gm Ivpb (Pre-Docked))  50 mls @ 100 

mls/hr IVPB Q8H-IV SUN


   PRN Reason: Protocol


   Last Admin: 05/13/17 10:33 Dose:  100 mls/hr


Sodium Chloride (Normal Saline -)  1,000 mls @ 75 mls/hr IV ASDIR SUN


Metformin HCl (Glucophage -)  1,000 mg PO BID@0700,1630 USN


Senna (Senna -)  1 tab PO BID SUN











- Objective


Vital Signs: 


 Vital Signs











Temperature  98.0 F   05/13/17 09:00


 


Pulse Rate  84   05/13/17 09:00


 


Respiratory Rate  20   05/13/17 10:00


 


Blood Pressure  98/56   05/13/17 09:00


 


O2 Sat by Pulse Oximetry (%)  98   05/13/17 10:00











Constitutional: Yes: No Distress


Eyes: Yes: WNL


HENT: Yes: WNL


Neck: Yes: WNL


Cardiovascular: Yes: WNL


Respiratory: Yes: WNL


Gastrointestinal: Yes: WNL


Genitourinary: Yes: WNL


Musculoskeletal: Yes: Muscle Weakness


Extremities: Yes: Deformity


Edema: No


Peripheral Pulses WNL: Yes


Integumentary: Yes: Pressure Ulcer, Other


Wound/Incision: Yes: Dressing Dry and Intact, Other


Neurological: Yes: Pre-Existing Deficit


...Motor Strength: LLE, RLE


Psychiatric: Yes: Other


Labs: 


 CBC, BMP





 05/13/17 06:00 





 05/13/17 06:00 





 INR, PTT











INR  1.43  (0.82-1.09)  H  05/11/17  11:16    














Problem List





- Problems


(1) Paraplegia following spinal cord injury


Code(s): G82.20 - PARAPLEGIA, UNSPECIFIED





(2) Decubitus ulcer


Code(s): L89.90 - PRESSURE ULCER OF UNSPECIFIED SITE, UNSPECIFIED STAGE   

Qualifiers: 


     Pressure ulcer location: sacral region     Pressure ulcer stage: stage 4  

      Qualified Code(s): L89.154 - Pressure ulcer of sacral region, stage 4  





(3) Diabetes


Code(s): E11.9 - TYPE 2 DIABETES MELLITUS WITHOUT COMPLICATIONS   Qualifiers: 


     Diabetes mellitus type: type 2     Diabetes mellitus complication status: 

with unspecified complications





(4) Hypertension


Code(s): I10 - ESSENTIAL (PRIMARY) HYPERTENSION   





(5) Sepsis


Code(s): A41.9 - SEPSIS, UNSPECIFIED ORGANISM   








Assessment/Plan


SACRAL ULCER STAGE 3-4 WILL NEED SURGICAL DEBRIDEMENT


PLASTIC SURGERY CONSULT CALLED


IV ABX


WOUND CARE


LABS CULTURES PENDING


ADA BGM CHECKS


SSI 


BACLOFEN/NEURONTIN RESTARTED

## 2017-05-14 RX ADMIN — SODIUM CHLORIDE SCH MLS/HR: 9 INJECTION, SOLUTION INTRAVENOUS at 14:43

## 2017-05-14 RX ADMIN — PIPERACILLIN SODIUM,TAZOBACTAM SODIUM SCH MLS/HR: 3; .375 INJECTION, POWDER, FOR SOLUTION INTRAVENOUS at 09:05

## 2017-05-14 RX ADMIN — METFORMIN HYDROCHLORIDE SCH MG: 500 TABLET ORAL at 09:06

## 2017-05-14 RX ADMIN — Medication SCH ML: at 17:55

## 2017-05-14 RX ADMIN — GABAPENTIN SCH MG: 300 CAPSULE ORAL at 09:06

## 2017-05-14 RX ADMIN — PIPERACILLIN SODIUM,TAZOBACTAM SODIUM SCH MLS/HR: 3; .375 INJECTION, POWDER, FOR SOLUTION INTRAVENOUS at 01:22

## 2017-05-14 RX ADMIN — SENNOSIDES SCH TAB: 8.6 TABLET, FILM COATED ORAL at 09:06

## 2017-05-14 RX ADMIN — METFORMIN HYDROCHLORIDE SCH MG: 500 TABLET ORAL at 17:53

## 2017-05-14 RX ADMIN — PIPERACILLIN SODIUM,TAZOBACTAM SODIUM SCH MLS/HR: 3; .375 INJECTION, POWDER, FOR SOLUTION INTRAVENOUS at 17:53

## 2017-05-14 RX ADMIN — SENNOSIDES SCH TAB: 8.6 TABLET, FILM COATED ORAL at 21:09

## 2017-05-14 RX ADMIN — SODIUM HYPOCHLORITE SCH APPLIC: 2.5 SOLUTION TOPICAL at 21:09

## 2017-05-14 RX ADMIN — SODIUM HYPOCHLORITE SCH APPLIC: 2.5 SOLUTION TOPICAL at 14:43

## 2017-05-14 RX ADMIN — BACLOFEN SCH MG: 10 TABLET ORAL at 21:09

## 2017-05-14 RX ADMIN — BACLOFEN SCH MG: 10 TABLET ORAL at 09:06

## 2017-05-14 RX ADMIN — GABAPENTIN SCH MG: 300 CAPSULE ORAL at 21:09

## 2017-05-14 RX ADMIN — SODIUM HYPOCHLORITE SCH APPLIC: 2.5 SOLUTION TOPICAL at 06:17

## 2017-05-14 NOTE — PN
Progress Note, Physician


History of Present Illness: 


No c/o buttock pain


No fever/ chills


Temps, WBC down





- Current Medication List


Current Medications: 


Active Medications





Acetaminophen (Tylenol -)  650 mg PO Q6H PRN


   PRN Reason: FEVER OR PAIN


   Last Admin: 05/13/17 20:59 Dose:  650 mg


Baclofen (Lioresal -)  10 mg PO BID [facility]


   Last Admin: 05/14/17 09:06 Dose:  10 mg


Gabapentin (Neurontin -)  300 mg PO BID [facility]


   Last Admin: 05/14/17 09:06 Dose:  300 mg


Piperacillin Sod/Tazobactam Sod (Zosyn 3.375gm Ivpb (Pre-Docked))  50 mls @ 100 

mls/hr IVPB Q8H-IV [facility]


   PRN Reason: Protocol


   Last Admin: 05/14/17 09:05 Dose:  100 mls/hr


Sodium Chloride (Normal Saline -)  1,000 mls @ 75 mls/hr IV ASDIR [facility]


   Last Admin: 05/13/17 17:42 Dose:  75 mls/hr


Metformin HCl (Glucophage -)  1,000 mg PO BID@0700,1630 [facility]


   Last Admin: 05/14/17 09:06 Dose:  1,000 mg


Senna (Senna -)  1 tab PO BID [facility]


   Last Admin: 05/14/17 09:06 Dose:  1 tab


Sodium Hypochlorite (Dakin's Solution 0.25% (Half-Strength) -)  1 applic TP TID 

[facility]


   Last Admin: 05/14/17 06:17 Dose:  1 applic











- Objective


Vital Signs: 


 Vital Signs











Temperature  98.1 F   05/14/17 06:00


 


Pulse Rate  64   05/14/17 06:00


 


Respiratory Rate  20   05/14/17 06:00


 


Blood Pressure  104/54   05/14/17 06:00


 


O2 Sat by Pulse Oximetry (%)  100   05/13/17 21:00











Constitutional: Yes: No Distress, Obese


Cardiovascular: Yes: Regular Rate and Rhythm, S1, S2


Respiratory: Yes: CTA Bilaterally


Gastrointestinal: Yes: Normal Bowel Sounds, Soft.  No: Tenderness


Integumentary: Yes: Other (+ R ischial decubitus)


Labs: 


 CBC, BMP





 05/13/17 06:00 





 05/13/17 06:00 





 INR, PTT











INR  1.43  (0.82-1.09)  H  05/11/17  11:16    














Assessment/Plan


Infected R ischial decubitus, possible osteomyelitis


Fever/ leukocytosis - improved


Continue zosyn


For OR debridement

## 2017-05-14 NOTE — PN
Progress Note, Physician


Chief Complaint: 


AWAKE ALERT EATING LUNCH, NAD





- Current Medication List


Current Medications: 


Active Medications





Acetaminophen (Tylenol -)  650 mg PO Q6H PRN


   PRN Reason: FEVER OR PAIN


   Last Admin: 05/13/17 20:59 Dose:  650 mg


Amino Acids (Prosource No Carb Liquid Pkt)  30 ml PO BID@0800,1730 [facility]


Baclofen (Lioresal -)  10 mg PO BID [facility]


   Last Admin: 05/14/17 09:06 Dose:  10 mg


Gabapentin (Neurontin -)  300 mg PO BID [facility]


   Last Admin: 05/14/17 09:06 Dose:  300 mg


Piperacillin Sod/Tazobactam Sod (Zosyn 3.375gm Ivpb (Pre-Docked))  50 mls @ 100 

mls/hr IVPB Q8H-IV SUN


   PRN Reason: Protocol


   Last Admin: 05/14/17 09:05 Dose:  100 mls/hr


Sodium Chloride (Normal Saline -)  1,000 mls @ 75 mls/hr IV ASDIR [facility]


   Last Admin: 05/14/17 14:43 Dose:  75 mls/hr


Metformin HCl (Glucophage -)  1,000 mg PO BID@0700,1630 [facility]


   Last Admin: 05/14/17 09:06 Dose:  1,000 mg


Multivitamins/Minerals/Vitamin C (Tab-A-Vit -)  1 tab PO DAILY [facility]


Senna (Senna -)  1 tab PO BID [facility]


   Last Admin: 05/14/17 09:06 Dose:  1 tab


Sodium Hypochlorite (Dakin's Solution 0.25% (Half-Strength) -)  1 applic TP TID 

[facility]


   Last Admin: 05/14/17 14:43 Dose:  1 applic


Zinc Sulfate (Orazinc -)  220 mg PO DAILY [facility]











- Objective


Vital Signs: 


 Vital Signs











Temperature  98.1 F   05/14/17 14:00


 


Pulse Rate  74   05/14/17 14:00


 


Respiratory Rate  20   05/14/17 14:00


 


Blood Pressure  111/61   05/14/17 14:00


 


O2 Sat by Pulse Oximetry (%)  100   05/14/17 10:00











Constitutional: Yes: No Distress


Eyes: Yes: WNL


HENT: Yes: WNL


Neck: Yes: WNL


Cardiovascular: Yes: WNL


Respiratory: Yes: WNL


Gastrointestinal: Yes: WNL


Genitourinary: Yes: WNL


Musculoskeletal: Yes: Muscle Weakness


Extremities: Yes: Deformity


Peripheral Pulses WNL: Yes


Integumentary: Yes: Pressure Ulcer


Wound/Incision: Yes: Dressing Dry and Intact, Other


Neurological: Yes: Pre-Existing Deficit


...Motor Strength: LLE, RLE


Psychiatric: Yes: Other


Labs: 


 CBC, BMP





 05/13/17 06:00 





 05/13/17 06:00 





 INR, PTT











INR  1.43  (0.82-1.09)  H  05/11/17  11:16    














Problem List





- Problems


(1) Paraplegia following spinal cord injury


Code(s): G82.20 - PARAPLEGIA, UNSPECIFIED





(2) Decubitus ulcer


Code(s): L89.90 - PRESSURE ULCER OF UNSPECIFIED SITE, UNSPECIFIED STAGE   

Qualifiers: 


     Pressure ulcer location: sacral region     Pressure ulcer stage: stage 4  

      Qualified Code(s): L89.154 - Pressure ulcer of sacral region, stage 4  





(3) Diabetes


Code(s): E11.9 - TYPE 2 DIABETES MELLITUS WITHOUT COMPLICATIONS   Qualifiers: 


     Diabetes mellitus type: type 2     Diabetes mellitus complication status: 

with unspecified complications





(4) Hypertension


Code(s): I10 - ESSENTIAL (PRIMARY) HYPERTENSION   





(5) Sepsis


Code(s): A41.9 - SEPSIS, UNSPECIFIED ORGANISM   








Assessment/Plan


SACRAL ULCER STAGE 3-4 WILL NEED SURGICAL DEBRIDEMENT


SCHEDULE FOR OR DEBRIDEMENT


PLASTIC SURGERY CONSULT CALLED


IV ABX


WOUND CARE


LABS CULTURES PENDING


ADA BGM CHECKS


SSI 


BACLOFEN/NEURONTIN RESTARTED

## 2017-05-15 LAB
CHOLEST SERPL-MCNC: 132 MG/DL (ref 50–200)
LDLC SERPL CALC-MCNC: 90 MG/DL (ref 5–100)

## 2017-05-15 RX ADMIN — Medication SCH ML: at 09:37

## 2017-05-15 RX ADMIN — METFORMIN HYDROCHLORIDE SCH MG: 500 TABLET ORAL at 09:37

## 2017-05-15 RX ADMIN — PIPERACILLIN SODIUM,TAZOBACTAM SODIUM SCH MLS/HR: 3; .375 INJECTION, POWDER, FOR SOLUTION INTRAVENOUS at 01:04

## 2017-05-15 RX ADMIN — SODIUM CHLORIDE SCH MLS/HR: 9 INJECTION, SOLUTION INTRAVENOUS at 16:47

## 2017-05-15 RX ADMIN — BACLOFEN SCH MG: 10 TABLET ORAL at 22:52

## 2017-05-15 RX ADMIN — PIPERACILLIN SODIUM,TAZOBACTAM SODIUM SCH MLS/HR: 3; .375 INJECTION, POWDER, FOR SOLUTION INTRAVENOUS at 09:37

## 2017-05-15 RX ADMIN — SODIUM HYPOCHLORITE SCH APPLIC: 2.5 SOLUTION TOPICAL at 16:46

## 2017-05-15 RX ADMIN — GABAPENTIN SCH MG: 300 CAPSULE ORAL at 22:52

## 2017-05-15 RX ADMIN — SENNOSIDES SCH TAB: 8.6 TABLET, FILM COATED ORAL at 09:37

## 2017-05-15 RX ADMIN — GABAPENTIN SCH MG: 300 CAPSULE ORAL at 09:42

## 2017-05-15 RX ADMIN — BACLOFEN SCH MG: 10 TABLET ORAL at 09:38

## 2017-05-15 RX ADMIN — ACETAMINOPHEN PRN MG: 325 TABLET ORAL at 20:05

## 2017-05-15 RX ADMIN — MULTIVITAMIN TABLET SCH TAB: TABLET at 09:37

## 2017-05-15 RX ADMIN — SODIUM HYPOCHLORITE SCH APPLIC: 2.5 SOLUTION TOPICAL at 06:10

## 2017-05-15 RX ADMIN — Medication SCH ML: at 16:47

## 2017-05-15 RX ADMIN — METFORMIN HYDROCHLORIDE SCH: 500 TABLET ORAL at 16:46

## 2017-05-15 RX ADMIN — PIPERACILLIN SODIUM,TAZOBACTAM SODIUM SCH MLS/HR: 3; .375 INJECTION, POWDER, FOR SOLUTION INTRAVENOUS at 17:50

## 2017-05-15 RX ADMIN — Medication SCH MG: at 09:38

## 2017-05-15 RX ADMIN — SODIUM HYPOCHLORITE SCH APPLIC: 2.5 SOLUTION TOPICAL at 22:53

## 2017-05-15 RX ADMIN — SENNOSIDES SCH TAB: 8.6 TABLET, FILM COATED ORAL at 22:53

## 2017-05-15 NOTE — PN
Progress Note, Physician


Chief Complaint: 


awake alert 


denies fever or chills





- Current Medication List


Current Medications: 


Active Medications





Acetaminophen (Tylenol -)  650 mg PO Q6H PRN


   PRN Reason: FEVER OR PAIN


   Last Admin: 05/13/17 20:59 Dose:  650 mg


Amino Acids (Prosource No Carb Liquid Pkt)  30 ml PO BID@0800,1730 [facility]


   Last Admin: 05/15/17 09:37 Dose:  30 ml


Baclofen (Lioresal -)  10 mg PO BID [facility]


   Last Admin: 05/15/17 09:38 Dose:  10 mg


Gabapentin (Neurontin -)  300 mg PO BID [facility]


   Last Admin: 05/15/17 09:42 Dose:  300 mg


Piperacillin Sod/Tazobactam Sod (Zosyn 3.375gm Ivpb (Pre-Docked))  50 mls @ 100 

mls/hr IVPB Q8H-IV [facility]


   PRN Reason: Protocol


   Last Admin: 05/15/17 09:37 Dose:  100 mls/hr


Sodium Chloride (Normal Saline -)  1,000 mls @ 75 mls/hr IV ASDIR [facility]


   Last Admin: 05/14/17 14:43 Dose:  75 mls/hr


Metformin HCl (Glucophage -)  1,000 mg PO BID@0700,1630 [facility]


   Last Admin: 05/15/17 09:37 Dose:  1,000 mg


Multivitamins/Minerals/Vitamin C (Tab-A-Vit -)  1 tab PO DAILY [facility]


   Last Admin: 05/15/17 09:37 Dose:  1 tab


Senna (Senna -)  1 tab PO BID [facility]


   Last Admin: 05/15/17 09:37 Dose:  1 tab


Sodium Hypochlorite (Dakin's Solution 0.25% (Half-Strength) -)  1 applic TP TID 

[facility]


   Last Admin: 05/15/17 06:10 Dose:  1 applic


Zinc Sulfate (Orazinc -)  220 mg PO DAILY [facility]


   Last Admin: 05/15/17 09:38 Dose:  220 mg











- Objective


Vital Signs: 


 Vital Signs











Temperature  98.5 F   05/15/17 13:58


 


Pulse Rate  80   05/15/17 13:58


 


Respiratory Rate  18   05/15/17 13:58


 


Blood Pressure  107/67   05/15/17 13:58


 


O2 Sat by Pulse Oximetry (%)  100   05/15/17 10:00











Constitutional: Yes: No Distress


Eyes: Yes: WNL


HENT: Yes: WNL


Neck: Yes: WNL


Cardiovascular: Yes: WNL


Respiratory: Yes: WNL


Gastrointestinal: Yes: WNL


Genitourinary: Yes: Incontinence


Musculoskeletal: Yes: Muscle Weakness


Extremities: Yes: Deformity


Edema: No


Peripheral Pulses WNL: Yes


Integumentary: Yes: Pressure Ulcer, Other


Wound/Incision: Yes: Dressing Dry and Intact, Unapproximated


Neurological: Yes: Pre-Existing Deficit


...Motor Strength: LLE, RLE


Labs: 


 CBC, BMP





 05/13/17 06:00 





 05/13/17 06:00 





 INR, PTT











INR  1.43  (0.82-1.09)  H  05/11/17  11:16    














Problem List





- Problems


(1) Paraplegia following spinal cord injury


Code(s): G82.20 - PARAPLEGIA, UNSPECIFIED





(2) Decubitus ulcer


Code(s): L89.90 - PRESSURE ULCER OF UNSPECIFIED SITE, UNSPECIFIED STAGE   

Qualifiers: 


     Pressure ulcer location: sacral region     Pressure ulcer stage: stage 4  

      Qualified Code(s): L89.154 - Pressure ulcer of sacral region, stage 4  





(3) Diabetes


Code(s): E11.9 - TYPE 2 DIABETES MELLITUS WITHOUT COMPLICATIONS   Qualifiers: 


     Diabetes mellitus type: type 2     Diabetes mellitus complication status: 

with unspecified complications





(4) Hypertension


Code(s): I10 - ESSENTIAL (PRIMARY) HYPERTENSION   





(5) Sepsis


Code(s): A41.9 - SEPSIS, UNSPECIFIED ORGANISM   








Assessment/Plan


BONE SCAN TRI PHASE ORDERED R/O OSTEOMYELITIS


IV ABX


PLASTIC SURGERY FOR DEBRIDEMENT


DVT PROPHYLAXIS


SSI CONTROL

## 2017-05-15 NOTE — PN
Progress Note, Physician


History of Present Illness: 


Pt seen and examined at bedside. He has no complaints. 





- Current Medication List


Current Medications: 


Active Medications





Acetaminophen (Tylenol -)  650 mg PO Q6H PRN


   PRN Reason: FEVER OR PAIN


   Last Admin: 05/13/17 20:59 Dose:  650 mg


Amino Acids (Prosource No Carb Liquid Pkt)  30 ml PO BID@0800,1730 [facility]


   Last Admin: 05/15/17 09:37 Dose:  30 ml


Baclofen (Lioresal -)  10 mg PO BID [facility]


   Last Admin: 05/15/17 09:38 Dose:  10 mg


Gabapentin (Neurontin -)  300 mg PO BID [facility]


   Last Admin: 05/15/17 09:42 Dose:  300 mg


Piperacillin Sod/Tazobactam Sod (Zosyn 3.375gm Ivpb (Pre-Docked))  50 mls @ 100 

mls/hr IVPB Q8H-IV [facility]


   PRN Reason: Protocol


   Last Admin: 05/15/17 09:37 Dose:  100 mls/hr


Sodium Chloride (Normal Saline -)  1,000 mls @ 75 mls/hr IV ASDIR [facility]


   Last Admin: 05/14/17 14:43 Dose:  75 mls/hr


Metformin HCl (Glucophage -)  1,000 mg PO BID@0700,1630 [facility]


   Last Admin: 05/15/17 09:37 Dose:  1,000 mg


Multivitamins/Minerals/Vitamin C (Tab-A-Vit -)  1 tab PO DAILY [facility]


   Last Admin: 05/15/17 09:37 Dose:  1 tab


Senna (Senna -)  1 tab PO BID [facility]


   Last Admin: 05/15/17 09:37 Dose:  1 tab


Sodium Hypochlorite (Dakin's Solution 0.25% (Half-Strength) -)  1 applic TP TID 

[facility]


   Last Admin: 05/15/17 06:10 Dose:  1 applic


Zinc Sulfate (Orazinc -)  220 mg PO DAILY [facility]


   Last Admin: 05/15/17 09:38 Dose:  220 mg











- Objective


Vital Signs: 


 Vital Signs











Temperature  98.5 F   05/15/17 13:58


 


Pulse Rate  80   05/15/17 13:58


 


Respiratory Rate  18   05/15/17 13:58


 


Blood Pressure  107/67   05/15/17 13:58


 


O2 Sat by Pulse Oximetry (%)  100   05/15/17 10:00











Constitutional: Yes: Calm


Eyes: Yes: Conjunctiva Clear


HENT: Yes: Atraumatic


Cardiovascular: Yes: S1, S2


Respiratory: Yes: CTA Bilaterally


Gastrointestinal: Yes: Soft


Genitourinary: Yes: Incontinence


Edema: No


Neurological: Yes: Pre-Existing Deficit


Psychiatric: Yes: Oriented


Labs: 


 CBC, BMP





 05/13/17 06:00 





 05/13/17 06:00 





 INR, PTT











INR  1.43  (0.82-1.09)  H  05/11/17  11:16    














Problem List





- Problems


(1) Hypertension


Code(s): I10 - ESSENTIAL (PRIMARY) HYPERTENSION   





(2) Paraplegia following spinal cord injury


Code(s): G82.20 - PARAPLEGIA, UNSPECIFIED





(3) Sepsis


Code(s): A41.9 - SEPSIS, UNSPECIFIED ORGANISM   





(4) Lactic acid acidosis


Code(s): E87.2 - ACIDOSIS





(5) Hyponatremia


Code(s): E87.1 - HYPO-OSMOLALITY AND HYPONATREMIA








Assessment/Plan


 Current Medications











Generic Name Dose Route Start Last Admin





  Trade Name Freq  PRN Reason Stop Dose Admin


 


Acetaminophen  650 mg 05/11/17 15:44 05/13/17 20:59





  Tylenol -  PO   650 mg





  Q6H PRN   Administration





  FEVER OR PAIN   


 


Amino Acids  30 ml 05/14/17 17:30 05/15/17 09:37





  Prosource No Carb Liquid Pkt  PO   30 ml





  BID@0800,1730 [facility]   Administration


 


Baclofen  10 mg 05/13/17 22:00 05/15/17 09:38





  Lioresal -  PO   10 mg





  BID SUN   Administration


 


Gabapentin  300 mg 05/13/17 22:00 05/15/17 09:42





  Neurontin -  PO   300 mg





  BID SUN   Administration


 


Piperacillin Sod/Tazobactam Sod  50 mls @ 100 mls/hr 05/11/17 18:00 05/15/17 09:

37





  Zosyn 3.375gm Ivpb (Pre-Docked)  IVPB   100 mls/hr





  Q8H-IV SUN   Administration





  Protocol   


 


Sodium Chloride  1,000 mls @ 75 mls/hr 05/13/17 11:23 05/14/17 14:43





  Normal Saline -  IV   75 mls/hr





  ASDIR [facility]   Administration


 


Metformin HCl  1,000 mg 05/13/17 16:30 05/15/17 09:37





  Glucophage -  PO   1,000 mg





  BID@0700,1630 [facility]   Administration


 


Multivitamins/Minerals/Vitamin C  1 tab 05/15/17 10:00 05/15/17 09:37





  Tab-A-Vit -  PO   1 tab





  DAILY [facility]   Administration


 


Senna  1 tab 05/13/17 22:00 05/15/17 09:37





  Senna -  PO   1 tab





  BID [facility]   Administration


 


Sodium Hypochlorite  1 applic 05/13/17 22:00 05/15/17 06:10





  Dakin's Solution 0.25% (Half-Strength) -  TP   1 applic





  TID [facility]   Administration


 


Zinc Sulfate  220 mg 05/15/17 10:00 05/15/17 09:38





  Orazinc -  PO   220 mg





  DAILY [facility]   Administration














Impression


1. hyponatremia


2. lactic acidosis secondary to sepsis


3. sepsis


4. quadraplegia


5. sacral decub





Plan


- no new labs


- will order bmp and lactic acid for am


- cont current meds


- pt off of fluids


- will follow PRN


- metabolic derangements likely from sepsis





Dr Mendoza

## 2017-05-16 LAB
ANION GAP SERPL CALC-SCNC: 9 MMOL/L (ref 8–16)
CALCIUM SERPL-MCNC: 8.4 MG/DL (ref 8.5–10.1)
CO2 SERPL-SCNC: 29 MMOL/L (ref 21–32)
COCKROFT - GAULT: 415.7
CREAT SERPL-MCNC: 0.3 MG/DL (ref 0.7–1.3)
DEPRECATED RDW RBC AUTO: 15.7 % (ref 11.9–15.9)
GLUCOSE SERPL-MCNC: 75 MG/DL (ref 74–106)
MCH RBC QN AUTO: 23.7 PG (ref 25.7–33.7)
MCHC RBC AUTO-ENTMCNC: 32.1 G/DL (ref 32–35.9)
MCV RBC: 73.6 FL (ref 80–96)
PLATELET # BLD AUTO: 484 K/MM3 (ref 134–434)
PMV BLD: 6.6 FL (ref 7.5–11.1)
WBC # BLD AUTO: 10.3 K/MM3 (ref 4–10)

## 2017-05-16 RX ADMIN — SODIUM HYPOCHLORITE SCH APPLIC: 2.5 SOLUTION TOPICAL at 15:24

## 2017-05-16 RX ADMIN — METFORMIN HYDROCHLORIDE SCH: 500 TABLET ORAL at 12:11

## 2017-05-16 RX ADMIN — PIPERACILLIN SODIUM,TAZOBACTAM SODIUM SCH MLS/HR: 3; .375 INJECTION, POWDER, FOR SOLUTION INTRAVENOUS at 01:41

## 2017-05-16 RX ADMIN — Medication SCH MG: at 11:01

## 2017-05-16 RX ADMIN — SENNOSIDES SCH TAB: 8.6 TABLET, FILM COATED ORAL at 11:00

## 2017-05-16 RX ADMIN — GABAPENTIN SCH MG: 300 CAPSULE ORAL at 11:00

## 2017-05-16 RX ADMIN — PIPERACILLIN SODIUM,TAZOBACTAM SODIUM SCH MLS/HR: 3; .375 INJECTION, POWDER, FOR SOLUTION INTRAVENOUS at 11:01

## 2017-05-16 RX ADMIN — BACLOFEN SCH MG: 10 TABLET ORAL at 11:00

## 2017-05-16 RX ADMIN — METFORMIN HYDROCHLORIDE SCH: 500 TABLET ORAL at 18:30

## 2017-05-16 RX ADMIN — SENNOSIDES SCH TAB: 8.6 TABLET, FILM COATED ORAL at 22:48

## 2017-05-16 RX ADMIN — GABAPENTIN SCH MG: 300 CAPSULE ORAL at 22:48

## 2017-05-16 RX ADMIN — Medication SCH ML: at 18:30

## 2017-05-16 RX ADMIN — MULTIVITAMIN TABLET SCH TAB: TABLET at 11:00

## 2017-05-16 RX ADMIN — BACLOFEN SCH MG: 10 TABLET ORAL at 22:48

## 2017-05-16 RX ADMIN — SODIUM HYPOCHLORITE SCH APPLIC: 2.5 SOLUTION TOPICAL at 06:03

## 2017-05-16 RX ADMIN — METFORMIN HYDROCHLORIDE SCH MG: 500 TABLET ORAL at 08:52

## 2017-05-16 RX ADMIN — Medication SCH ML: at 08:52

## 2017-05-16 RX ADMIN — SODIUM CHLORIDE SCH MLS/HR: 9 INJECTION, SOLUTION INTRAVENOUS at 15:24

## 2017-05-16 RX ADMIN — SODIUM HYPOCHLORITE SCH APPLIC: 2.5 SOLUTION TOPICAL at 22:48

## 2017-05-16 RX ADMIN — PIPERACILLIN SODIUM,TAZOBACTAM SODIUM SCH MLS/HR: 3; .375 INJECTION, POWDER, FOR SOLUTION INTRAVENOUS at 18:29

## 2017-05-16 NOTE — PN
Progress Note, Physician


Chief Complaint: 


AWAKE ALERT


NAD


GOING FOR TRIPHASE SCAN





- Current Medication List


Current Medications: 


Active Medications





Acetaminophen (Tylenol -)  650 mg PO Q6H PRN


   PRN Reason: FEVER OR PAIN


   Last Admin: 05/15/17 20:05 Dose:  650 mg


Amino Acids (Prosource No Carb Liquid Pkt)  30 ml PO BID@0800,1730 [facility]


   Last Admin: 05/16/17 08:52 Dose:  30 ml


Baclofen (Lioresal -)  10 mg PO BID [facility]


   Last Admin: 05/16/17 11:00 Dose:  10 mg


Gabapentin (Neurontin -)  300 mg PO BID [facility]


   Last Admin: 05/16/17 11:00 Dose:  300 mg


Piperacillin Sod/Tazobactam Sod (Zosyn 3.375gm Ivpb (Pre-Docked))  50 mls @ 100 

mls/hr IVPB Q8H-IV [facility]


   PRN Reason: Protocol


   Last Admin: 05/16/17 11:01 Dose:  100 mls/hr


Sodium Chloride (Normal Saline -)  1,000 mls @ 75 mls/hr IV ASDIR [facility]


   Last Admin: 05/15/17 16:47 Dose:  75 mls/hr


Metformin HCl (Glucophage -)  1,000 mg PO BID@0700,1630 [facility]


   Last Admin: 05/16/17 12:11 Dose:  Not Given


Multivitamins/Minerals/Vitamin C (Tab-A-Vit -)  1 tab PO DAILY [facility]


   Last Admin: 05/16/17 11:00 Dose:  1 tab


Senna (Senna -)  1 tab PO BID [facility]


   Last Admin: 05/16/17 11:00 Dose:  1 tab


Sodium Hypochlorite (Dakin's Solution 0.25% (Half-Strength) -)  1 applic TP TID 

[facility]


   Last Admin: 05/16/17 06:03 Dose:  1 applic


Zinc Sulfate (Orazinc -)  220 mg PO DAILY [facility]


   Last Admin: 05/16/17 11:01 Dose:  220 mg











- Objective


Vital Signs: 


 Vital Signs











Temperature  98 F   05/16/17 09:00


 


Pulse Rate  68   05/16/17 09:00


 


Respiratory Rate  18   05/16/17 09:00


 


Blood Pressure  116/60   05/16/17 09:00


 


O2 Sat by Pulse Oximetry (%)  97   05/16/17 09:00











Constitutional: Yes: No Distress


Eyes: Yes: WNL


HENT: Yes: WNL


Neck: Yes: WNL


Cardiovascular: Yes: WNL


Respiratory: Yes: WNL


Gastrointestinal: Yes: WNL


Genitourinary: Yes: WNL, Other


Musculoskeletal: Yes: Back Pain, Muscle Weakness


Extremities: Yes: Deformity, Other


Edema: No


Integumentary: Yes: Pressure Ulcer, Other


Wound/Incision: Yes: Dressing Dry and Intact, Unapproximated


Neurological: Yes: Pre-Existing Deficit


...Motor Strength: LLE, RLE


Psychiatric: Yes: Other


Labs: 


 CBC, BMP





 05/16/17 06:35 





 05/16/17 06:35 





 INR, PTT











INR  1.43  (0.82-1.09)  H  05/11/17  11:16    














Problem List





- Problems


(1) Paraplegia following spinal cord injury


Code(s): G82.20 - PARAPLEGIA, UNSPECIFIED





(2) Decubitus ulcer


Code(s): L89.90 - PRESSURE ULCER OF UNSPECIFIED SITE, UNSPECIFIED STAGE   

Qualifiers: 


     Pressure ulcer location: sacral region     Pressure ulcer stage: stage 4  

      Qualified Code(s): L89.154 - Pressure ulcer of sacral region, stage 4  





(3) Diabetes


Code(s): E11.9 - TYPE 2 DIABETES MELLITUS WITHOUT COMPLICATIONS   Qualifiers: 


     Diabetes mellitus type: type 2     Diabetes mellitus complication status: 

with unspecified complications





(4) Hypertension


Code(s): I10 - ESSENTIAL (PRIMARY) HYPERTENSION   





(5) Sepsis


Code(s): A41.9 - SEPSIS, UNSPECIFIED ORGANISM   








Assessment/Plan


CHECK TRIPHASE SCAN FOR OSTEOMYELITIS OF RIGHT HIP


IV ABX


PLAST SURGERY FOR DEBRIDEMENT

## 2017-05-16 NOTE — PN
Progress Note, Physician


History of Present Illness: 


Pt seen and examined at bedside. He is awake and alert. He has no complaints. 





- Current Medication List


Current Medications: 


Active Medications





Acetaminophen (Tylenol -)  650 mg PO Q6H PRN


   PRN Reason: FEVER OR PAIN


   Last Admin: 05/15/17 20:05 Dose:  650 mg


Amino Acids (Prosource No Carb Liquid Pkt)  30 ml PO BID@0800,1730 [facility]


   Last Admin: 05/16/17 08:52 Dose:  30 ml


Baclofen (Lioresal -)  10 mg PO BID [facility]


   Last Admin: 05/16/17 11:00 Dose:  10 mg


Gabapentin (Neurontin -)  300 mg PO BID [facility]


   Last Admin: 05/16/17 11:00 Dose:  300 mg


Piperacillin Sod/Tazobactam Sod (Zosyn 3.375gm Ivpb (Pre-Docked))  50 mls @ 100 

mls/hr IVPB Q8H-IV [facility]


   PRN Reason: Protocol


   Last Admin: 05/16/17 11:01 Dose:  100 mls/hr


Sodium Chloride (Normal Saline -)  1,000 mls @ 75 mls/hr IV ASDIR [facility]


   Last Admin: 05/15/17 16:47 Dose:  75 mls/hr


Metformin HCl (Glucophage -)  1,000 mg PO BID@0700,1630 [facility]


   Last Admin: 05/16/17 12:11 Dose:  Not Given


Multivitamins/Minerals/Vitamin C (Tab-A-Vit -)  1 tab PO DAILY [facility]


   Last Admin: 05/16/17 11:00 Dose:  1 tab


Senna (Senna -)  1 tab PO BID [facility]


   Last Admin: 05/16/17 11:00 Dose:  1 tab


Sodium Hypochlorite (Dakin's Solution 0.25% (Half-Strength) -)  1 applic TP TID 

[facility]


   Last Admin: 05/16/17 06:03 Dose:  1 applic


Zinc Sulfate (Orazinc -)  220 mg PO DAILY [facility]


   Last Admin: 05/16/17 11:01 Dose:  220 mg











- Objective


Vital Signs: 


 Vital Signs











Temperature  98 F   05/16/17 09:00


 


Pulse Rate  68   05/16/17 09:00


 


Respiratory Rate  18   05/16/17 09:00


 


Blood Pressure  116/60   05/16/17 09:00


 


O2 Sat by Pulse Oximetry (%)  97   05/16/17 09:00











Constitutional: Yes: Calm


Eyes: Yes: Conjunctiva Clear


HENT: Yes: Atraumatic


Neck: Yes: Supple


Cardiovascular: Yes: S1, S2


Respiratory: Yes: CTA Bilaterally


Gastrointestinal: Yes: Normal Bowel Sounds, Soft


Musculoskeletal: Yes: Muscle Weakness


Edema: No


Neurological: Yes: Oriented, Pre-Existing Deficit


Psychiatric: Yes: Oriented


Labs: 


 CBC, BMP





 05/16/17 06:35 





 05/16/17 06:35 





 INR, PTT











INR  1.43  (0.82-1.09)  H  05/11/17  11:16    














Problem List





- Problems


(1) Hypertension


Code(s): I10 - ESSENTIAL (PRIMARY) HYPERTENSION   





(2) Paraplegia following spinal cord injury


Code(s): G82.20 - PARAPLEGIA, UNSPECIFIED





(3) Sepsis


Code(s): A41.9 - SEPSIS, UNSPECIFIED ORGANISM   





(4) Lactic acid acidosis


Code(s): E87.2 - ACIDOSIS





(5) Hyponatremia


Code(s): E87.1 - HYPO-OSMOLALITY AND HYPONATREMIA








Assessment/Plan


 Current Medications











Generic Name Dose Route Start Last Admin





  Trade Name Freq  PRN Reason Stop Dose Admin


 


Acetaminophen  650 mg 05/11/17 15:44 05/15/17 20:05





  Tylenol -  PO   650 mg





  Q6H PRN   Administration





  FEVER OR PAIN   


 


Amino Acids  30 ml 05/14/17 17:30 05/16/17 08:52





  Prosource No Carb Liquid Pkt  PO   30 ml





  BID@0800,1730 [facility]   Administration


 


Baclofen  10 mg 05/13/17 22:00 05/16/17 11:00





  Lioresal -  PO   10 mg





  BID SUN   Administration


 


Gabapentin  300 mg 05/13/17 22:00 05/16/17 11:00





  Neurontin -  PO   300 mg





  BID SUN   Administration


 


Piperacillin Sod/Tazobactam Sod  50 mls @ 100 mls/hr 05/11/17 18:00 05/16/17 11:

01





  Zosyn 3.375gm Ivpb (Pre-Docked)  IVPB   100 mls/hr





  Q8H-IV SUN   Administration





  Protocol   


 


Sodium Chloride  1,000 mls @ 75 mls/hr 05/13/17 11:23 05/15/17 16:47





  Normal Saline -  IV   75 mls/hr





  ASDIR SUN   Administration


 


Metformin HCl  1,000 mg 05/13/17 16:30 05/16/17 12:11





  Glucophage -  PO   Not Given





  BID@0700,1630 [facility]   


 


Multivitamins/Minerals/Vitamin C  1 tab 05/15/17 10:00 05/16/17 11:00





  Tab-A-Vit -  PO   1 tab





  DAILY SUN   Administration


 


Senna  1 tab 05/13/17 22:00 05/16/17 11:00





  Senna -  PO   1 tab





  BID SUN   Administration


 


Sodium Hypochlorite  1 applic 05/13/17 22:00 05/16/17 06:03





  Dakin's Solution 0.25% (Half-Strength) -  TP   1 applic





  TID SUN   Administration


 


Zinc Sulfate  220 mg 05/15/17 10:00 05/16/17 11:01





  Orazinc -  PO   220 mg





  DAILY SUN   Administration








 Laboratory Tests











  05/16/17





  06:35


 


Lactic Acid  0.951














Impression


1. hyponatremia


2. lactic acidosis secondary to sepsis


3. sepsis


4. quadraplegia


5. sacral decub





Plan


- labs reviewed


- lactic acid is normalized


- renal function is stable


- pt tolerating diet


- will sign off


- metabolic derangements likely from sepsis





Dr Mendoza

## 2017-05-17 PROCEDURE — 0QB10ZX EXCISION OF SACRUM, OPEN APPROACH, DIAGNOSTIC: ICD-10-PCS | Performed by: PLASTIC SURGERY

## 2017-05-17 PROCEDURE — 0QB10ZZ EXCISION OF SACRUM, OPEN APPROACH: ICD-10-PCS | Performed by: PLASTIC SURGERY

## 2017-05-17 RX ADMIN — SODIUM CHLORIDE SCH MLS/HR: 9 INJECTION, SOLUTION INTRAVENOUS at 18:23

## 2017-05-17 RX ADMIN — GABAPENTIN SCH MG: 300 CAPSULE ORAL at 09:42

## 2017-05-17 RX ADMIN — Medication SCH: at 09:42

## 2017-05-17 RX ADMIN — METFORMIN HYDROCHLORIDE SCH: 500 TABLET ORAL at 06:04

## 2017-05-17 RX ADMIN — BACLOFEN SCH MG: 10 TABLET ORAL at 09:42

## 2017-05-17 RX ADMIN — Medication SCH: at 09:41

## 2017-05-17 RX ADMIN — SODIUM CHLORIDE SCH MLS/HR: 9 INJECTION, SOLUTION INTRAVENOUS at 05:35

## 2017-05-17 RX ADMIN — METFORMIN HYDROCHLORIDE SCH: 500 TABLET ORAL at 22:03

## 2017-05-17 RX ADMIN — SODIUM HYPOCHLORITE SCH APPLIC: 2.5 SOLUTION TOPICAL at 05:35

## 2017-05-17 RX ADMIN — MULTIVITAMIN TABLET SCH: TABLET at 09:43

## 2017-05-17 RX ADMIN — PIPERACILLIN SODIUM,TAZOBACTAM SODIUM SCH MLS/HR: 3; .375 INJECTION, POWDER, FOR SOLUTION INTRAVENOUS at 02:52

## 2017-05-17 RX ADMIN — SODIUM HYPOCHLORITE SCH: 2.5 SOLUTION TOPICAL at 22:02

## 2017-05-17 RX ADMIN — SENNOSIDES SCH TAB: 8.6 TABLET, FILM COATED ORAL at 21:35

## 2017-05-17 RX ADMIN — Medication SCH ML: at 18:22

## 2017-05-17 RX ADMIN — SENNOSIDES SCH: 8.6 TABLET, FILM COATED ORAL at 09:43

## 2017-05-17 RX ADMIN — GABAPENTIN SCH: 300 CAPSULE ORAL at 21:35

## 2017-05-17 RX ADMIN — PIPERACILLIN SODIUM,TAZOBACTAM SODIUM SCH MLS/HR: 3; .375 INJECTION, POWDER, FOR SOLUTION INTRAVENOUS at 18:22

## 2017-05-17 RX ADMIN — BACLOFEN SCH MG: 10 TABLET ORAL at 21:35

## 2017-05-17 RX ADMIN — PIPERACILLIN SODIUM,TAZOBACTAM SODIUM SCH MLS/HR: 3; .375 INJECTION, POWDER, FOR SOLUTION INTRAVENOUS at 09:41

## 2017-05-17 NOTE — PN
Progress Note (short form)





- Note


Progress Note: 


FU Right Iscnial Grade IV infected decubitus





Plan : OR debridementt, Pulsavac, bone Biopsy for osteomylitis


Spoke with sachin Ramirez, he agrees with paln, and will clear him Medically


Patient will also need pic-line for extended time for osteomylitis , once wound 

is clean will suggest VAC, 





Dr Chowdhury

## 2017-05-17 NOTE — PN
Progress Note, SLP





- Note


Progress Note: 


No further f/u indicated. I will sign off for now.


Thank you for the initial referral.

## 2017-05-17 NOTE — PN
Progress Note, Physician


Chief Complaint: 


AWAKE ALERT


OSTEOMYELITIS SHOWS ON TRIPHASE SCAN


WILL NEED IV ABX AND WOUND DEBRIDEMENT





- Current Medication List


Current Medications: 


Active Medications





Acetaminophen (Tylenol -)  650 mg PO Q6H PRN


   PRN Reason: FEVER OR PAIN


   Last Admin: 05/15/17 20:05 Dose:  650 mg


Amino Acids (Prosource No Carb Liquid Pkt)  30 ml PO BID@0800,1730 [facility]


   Last Admin: 05/17/17 09:41 Dose:  Not Given


Baclofen (Lioresal -)  10 mg PO BID [facility]


   Last Admin: 05/17/17 09:42 Dose:  10 mg


Gabapentin (Neurontin -)  300 mg PO BID [facility]


   Last Admin: 05/17/17 09:42 Dose:  300 mg


Piperacillin Sod/Tazobactam Sod (Zosyn 3.375gm Ivpb (Pre-Docked))  50 mls @ 100 

mls/hr IVPB Q8H-IV [facility]


   PRN Reason: Protocol


   Last Admin: 05/17/17 09:41 Dose:  100 mls/hr


Sodium Chloride (Normal Saline -)  1,000 mls @ 75 mls/hr IV ASDIR [facility]


   Last Admin: 05/17/17 05:35 Dose:  75 mls/hr


Dextrose (D5w -)  250 mls @ 100 mls/hr IVPB ONCE ONE


   Stop: 05/17/17 15:44


Metformin HCl (Glucophage -)  1,000 mg PO BID@0700,1630 [facility]


   Last Admin: 05/17/17 06:04 Dose:  Not Given


Multivitamins/Minerals/Vitamin C (Tab-A-Vit -)  1 tab PO DAILY [facility]


   Last Admin: 05/17/17 09:43 Dose:  Not Given


Senna (Senna -)  1 tab PO BID [facility]


   Last Admin: 05/17/17 09:43 Dose:  Not Given


Sodium Hypochlorite (Dakin's Solution 0.25% (Half-Strength) -)  1 applic TP TID 

[facility]


   Last Admin: 05/17/17 05:35 Dose:  1 applic


Zinc Sulfate (Orazinc -)  220 mg PO DAILY [facility]


   Last Admin: 05/17/17 09:42 Dose:  Not Given











- Objective


Vital Signs: 


 Vital Signs











Temperature  99.5 F   05/17/17 09:47


 


Pulse Rate  72   05/17/17 09:47


 


Respiratory Rate  19   05/17/17 09:47


 


Blood Pressure  125/71   05/17/17 09:47


 


O2 Sat by Pulse Oximetry (%)  96   05/17/17 09:00











Constitutional: Yes: Mild Distress


Eyes: Yes: WNL


HENT: Yes: WNL


Neck: Yes: WNL


Cardiovascular: Yes: WNL


Respiratory: Yes: WNL


Gastrointestinal: Yes: WNL


Genitourinary: Yes: Incontinence


Musculoskeletal: Yes: Muscle Weakness


Extremities: Yes: WNL


Edema: No


Peripheral Pulses WNL: Yes


Integumentary: Yes: Pressure Ulcer, Other


Wound/Incision: Yes: Dressing Removed, Other (STAGE 3-4 ULCER RIGHT HIP LEG)


Neurological: Yes: Pre-Existing Deficit


...Motor Strength: LLE, RLE


Psychiatric: Yes: Other


Labs: 


 CBC, BMP





 05/16/17 06:35 





 05/16/17 06:35 





 INR, PTT











INR  1.43  (0.82-1.09)  H  05/11/17  11:16    














Problem List





- Problems


(1) Paraplegia following spinal cord injury


Code(s): G82.20 - PARAPLEGIA, UNSPECIFIED





(2) Decubitus ulcer


Code(s): L89.90 - PRESSURE ULCER OF UNSPECIFIED SITE, UNSPECIFIED STAGE   

Qualifiers: 


     Pressure ulcer location: sacral region     Pressure ulcer stage: stage 4  

      Qualified Code(s): L89.154 - Pressure ulcer of sacral region, stage 4  





(3) Diabetes


Code(s): E11.9 - TYPE 2 DIABETES MELLITUS WITHOUT COMPLICATIONS   Qualifiers: 


     Diabetes mellitus type: type 2     Diabetes mellitus complication status: 

with unspecified complications





(4) Hypertension


Code(s): I10 - ESSENTIAL (PRIMARY) HYPERTENSION   





(5) Sepsis


Code(s): A41.9 - SEPSIS, UNSPECIFIED ORGANISM   








Assessment/Plan


PATIENT IS MEDICALLY CLEARED FOR SURGICAL DEBRIDEMENT  AND WOUND SURGERY 


FOR TODAY.


WILL NEED PICC LINE AND ANTIBIOTICS


WOUND AND BIOPSY FOR SENSITIVITY BY DR MATHEWS

## 2017-05-18 RX ADMIN — SENNOSIDES SCH: 8.6 TABLET, FILM COATED ORAL at 21:37

## 2017-05-18 RX ADMIN — SODIUM CHLORIDE SCH MLS/HR: 9 INJECTION, SOLUTION INTRAVENOUS at 17:26

## 2017-05-18 RX ADMIN — GABAPENTIN SCH: 300 CAPSULE ORAL at 21:37

## 2017-05-18 RX ADMIN — MULTIVITAMIN TABLET SCH TAB: TABLET at 09:14

## 2017-05-18 RX ADMIN — PIPERACILLIN SODIUM,TAZOBACTAM SODIUM SCH MLS/HR: 3; .375 INJECTION, POWDER, FOR SOLUTION INTRAVENOUS at 01:17

## 2017-05-18 RX ADMIN — Medication SCH MG: at 09:14

## 2017-05-18 RX ADMIN — ACETAMINOPHEN PRN MG: 325 TABLET ORAL at 17:27

## 2017-05-18 RX ADMIN — PIPERACILLIN SODIUM,TAZOBACTAM SODIUM SCH MLS/HR: 3; .375 INJECTION, POWDER, FOR SOLUTION INTRAVENOUS at 17:26

## 2017-05-18 RX ADMIN — BACLOFEN SCH MG: 10 TABLET ORAL at 09:14

## 2017-05-18 RX ADMIN — METFORMIN HYDROCHLORIDE SCH: 500 TABLET ORAL at 17:26

## 2017-05-18 RX ADMIN — Medication SCH ML: at 17:27

## 2017-05-18 RX ADMIN — BACLOFEN SCH MG: 10 TABLET ORAL at 21:36

## 2017-05-18 RX ADMIN — SENNOSIDES SCH TAB: 8.6 TABLET, FILM COATED ORAL at 21:38

## 2017-05-18 RX ADMIN — SILVER SULFADIAZINE SCH APPLIC: 10 CREAM TOPICAL at 23:31

## 2017-05-18 RX ADMIN — SENNOSIDES SCH TAB: 8.6 TABLET, FILM COATED ORAL at 09:13

## 2017-05-18 RX ADMIN — METFORMIN HYDROCHLORIDE SCH: 500 TABLET ORAL at 09:13

## 2017-05-18 RX ADMIN — Medication SCH ML: at 09:12

## 2017-05-18 RX ADMIN — GABAPENTIN SCH MG: 300 CAPSULE ORAL at 09:14

## 2017-05-18 RX ADMIN — PIPERACILLIN SODIUM,TAZOBACTAM SODIUM SCH MLS/HR: 3; .375 INJECTION, POWDER, FOR SOLUTION INTRAVENOUS at 10:10

## 2017-05-18 NOTE — PN
Progress Note, Physician


Chief Complaint: 


ASLEEP


COMFORTABLE


EVENTS AND NOTES REVIEWED





- Current Medication List


Current Medications: 


Active Medications





Acetaminophen (Tylenol -)  650 mg PO Q6H PRN


   PRN Reason: FEVER OR PAIN


Amino Acids (Prosource No Carb Liquid Pkt)  30 ml PO BID@0800,1730 [facility]


   Last Admin: 05/18/17 09:12 Dose:  30 ml


Baclofen (Lioresal -)  10 mg PO BID [facility]


   Last Admin: 05/18/17 09:14 Dose:  10 mg


Gabapentin (Neurontin -)  300 mg PO BID [facility]


   Last Admin: 05/18/17 09:14 Dose:  300 mg


Sodium Chloride (Normal Saline -)  1,000 mls @ 75 mls/hr IV ASDIR [facility]


   Last Admin: 05/17/17 18:23 Dose:  75 mls/hr


Piperacillin Sod/Tazobactam Sod (Zosyn 3.375gm Ivpb (Pre-Docked))  50 mls @ 100 

mls/hr IVPB Q8H-IV SUN


   PRN Reason: Protocol


   Last Admin: 05/18/17 01:17 Dose:  100 mls/hr


Metformin HCl (Glucophage -)  1,000 mg PO BID@0700,1630 [facility]


   Last Admin: 05/18/17 09:13 Dose:  Not Given


Multivitamins/Minerals/Vitamin C (Tab-A-Vit -)  1 tab PO DAILY [facility]


   Last Admin: 05/18/17 09:14 Dose:  1 tab


Oxycodone HCl (Roxicodone -)  5 mg PO Q4H PRN


   PRN Reason: MILD PAIN


   Stop: 05/18/17 16:06


Senna (Senna -)  1 tab PO BID [facility]


   Last Admin: 05/18/17 09:13 Dose:  1 tab


Zinc Sulfate (Orazinc -)  220 mg PO DAILY [facility]


   Last Admin: 05/18/17 09:14 Dose:  220 mg











- Objective


Vital Signs: 


 Vital Signs











Temperature  97.9 F   05/18/17 09:35


 


Pulse Rate  74   05/18/17 09:35


 


Respiratory Rate  18   05/18/17 09:35


 


Blood Pressure  95/52   05/18/17 09:35


 


O2 Sat by Pulse Oximetry (%)  99   05/17/17 21:00











Constitutional: Yes: No Distress


Eyes: Yes: WNL


HENT: Yes: WNL


Neck: Yes: WNL


Cardiovascular: Yes: WNL


Respiratory: Yes: WNL


Gastrointestinal: Yes: WNL


Genitourinary: Yes: Other


Musculoskeletal: Yes: Muscle Weakness


Extremities: Yes: Deformity, Other


Edema: No


Peripheral Pulses WNL: Yes


Integumentary: Yes: Other


Wound/Incision: Yes: Dressing Dry and Intact, Draining, Unapproximated (RIGHT 

HIP)


Neurological: Yes: Loss of Sensation, Pre-Existing Deficit


...Motor Strength: LLE, RLE


Psychiatric: Yes: Other


Labs: 


 CBC, BMP





 05/16/17 06:35 





 05/16/17 06:35 





 INR, PTT











INR  1.43  (0.82-1.09)  H  05/11/17  11:16    














Problem List





- Problems


(1) Paraplegia following spinal cord injury


Code(s): G82.20 - PARAPLEGIA, UNSPECIFIED





(2) Decubitus ulcer


Code(s): L89.90 - PRESSURE ULCER OF UNSPECIFIED SITE, UNSPECIFIED STAGE   

Qualifiers: 


     Pressure ulcer location: sacral region     Pressure ulcer stage: stage 4  

      Qualified Code(s): L89.154 - Pressure ulcer of sacral region, stage 4  





(3) Diabetes


Code(s): E11.9 - TYPE 2 DIABETES MELLITUS WITHOUT COMPLICATIONS   Qualifiers: 


     Diabetes mellitus type: type 2     Diabetes mellitus complication status: 

with unspecified complications





(4) Hypertension


Code(s): I10 - ESSENTIAL (PRIMARY) HYPERTENSION   





(5) Sepsis


Code(s): A41.9 - SEPSIS, UNSPECIFIED ORGANISM   








Assessment/Plan


SURGICAL DEBRIDEMENT YESTERDAY


AWAIT BIOPSY/WOUND CULTURE RESULT FOR SENSITIVITY


+OSTEOMYELITIS ON TRIPHASE SCAN


WILL NEED PICC LINE


LONG TERM ABX


WOUND CARE

## 2017-05-18 NOTE — PN
Progress Note (short form)





- Note


Progress Note: 


FU post debridement Right Grade IV infected Ischial decubitus





Dressing changed, wound evaluated, no odor, no erythema, no induration, minimal 

oozing from ulcer edges as dressing was removed


Surgicel packing removed


Wound cleaned


Plan:


1. D/C Dakins solution





2.Silvidine cream BID, irrigate well with 35 cc syringe and #20 angiocath





3Antibiotics for osteomyelitis, awaiting bone biopsy





FU in wound clinic





Recommend VAC or surgical closure in 4 weeks after bone has been treated for 

osteomyelitis

## 2017-05-18 NOTE — OP
DATE OF OPERATION:

 

DATE OF DICTATION:  05/17/2017

 

PREOPERATIVE DIAGNOSIS:  Infected right ischial decubitus.  

 

POSTOPERATIVE DIAGNOSIS:  Infected right ischial decubitus, grade 4 ischial 
decubitus

with bone exposure.

 

PROCEDURE DONE:  

1.  Debridement.

2.  Bone biopsy.

3.  Pulsavac irrigation. 

4.  Cauterization. 



SURGEON:  Felice Mathews MD

 

DESCRIPTION OF PROCEDURE:  Patient was brought to the operating room.  He was

transferred over to the operating table.  At this time, he was placed on a 
beanbag

and positioned on  left lateral.  Once the standard procedure of identification 
Time -Out  was carried

out including the site, the area was prepped with Betadine and draped in a 
standard

aseptic manner.  The subcutaneous tissue around the decubitus was injected with 
1%

lidocaine with epinephrine.  A total of 10 mL was injected.  Using a 10 scalpel

blade, an incision was made along the decubitus ulcer extending at 1 o'clock

position, incising the skin, subcutaneous tissue, and fibrotic tissue around the

scar.  Significant bleeding was noted.  Bleeding was controlled , Blood vessels/
bleeders cauterized and controlled.  Next,

the periosteum over the bone was curretted, irrigated , followed by a bone 
biopsy.  It was sent for

pathology as well as for biopsy /culture  for bacteria.  

 bleeding from the bone wa controlled with Surgicel.  



Dressing : Wound was then dressed and packed with 4 x 4 moistened

with normal saline solution.  Area was then covered with combine/padded pad.  

Patient was transferred to recovery room in a satisfactory condition.  



Estimated blood loss was less than 50 mL.

  Patient tolerated the procedure well.  He will continue to receive

antibiotics for osteomyelitis.  

 

 

FELICE MATHEWS M.D.

 

[id number]

DD: 05/17/2017 16:16

DT: 05/18/2017 13:27

Job #:  [id number]

MTDD

## 2017-05-19 LAB
ALBUMIN SERPL-MCNC: 2.5 G/DL (ref 3.4–5)
ALP SERPL-CCNC: 77 U/L (ref 45–117)
ALT SERPL-CCNC: 9 U/L (ref 12–78)
ANION GAP SERPL CALC-SCNC: 11 MMOL/L (ref 8–16)
AST SERPL-CCNC: 11 U/L (ref 15–37)
BILIRUB SERPL-MCNC: 0.4 MG/DL (ref 0.2–1)
CALCIUM SERPL-MCNC: 8 MG/DL (ref 8.5–10.1)
CO2 SERPL-SCNC: 26 MMOL/L (ref 21–32)
CREAT SERPL-MCNC: 0.3 MG/DL (ref 0.7–1.3)
DEPRECATED RDW RBC AUTO: 16.1 % (ref 11.9–15.9)
GLUCOSE SERPL-MCNC: 73 MG/DL (ref 74–106)
MCH RBC QN AUTO: 23.7 PG (ref 25.7–33.7)
MCHC RBC AUTO-ENTMCNC: 32.1 G/DL (ref 32–35.9)
MCV RBC: 73.8 FL (ref 80–96)
PLATELET # BLD AUTO: 515 K/MM3 (ref 134–434)
PMV BLD: 6.3 FL (ref 7.5–11.1)
PROT SERPL-MCNC: 6.8 G/DL (ref 6.4–8.2)
WBC # BLD AUTO: 11.3 K/MM3 (ref 4–10)

## 2017-05-19 RX ADMIN — Medication SCH ML: at 08:42

## 2017-05-19 RX ADMIN — SENNOSIDES SCH: 8.6 TABLET, FILM COATED ORAL at 22:18

## 2017-05-19 RX ADMIN — PIPERACILLIN SODIUM,TAZOBACTAM SODIUM SCH MLS/HR: 3; .375 INJECTION, POWDER, FOR SOLUTION INTRAVENOUS at 17:14

## 2017-05-19 RX ADMIN — PIPERACILLIN SODIUM,TAZOBACTAM SODIUM SCH MLS/HR: 3; .375 INJECTION, POWDER, FOR SOLUTION INTRAVENOUS at 01:11

## 2017-05-19 RX ADMIN — METFORMIN HYDROCHLORIDE SCH: 500 TABLET ORAL at 07:21

## 2017-05-19 RX ADMIN — MULTIVITAMIN TABLET SCH TAB: TABLET at 09:43

## 2017-05-19 RX ADMIN — SILVER SULFADIAZINE SCH APPLIC: 10 CREAM TOPICAL at 22:18

## 2017-05-19 RX ADMIN — GABAPENTIN SCH MG: 300 CAPSULE ORAL at 10:23

## 2017-05-19 RX ADMIN — BACLOFEN SCH: 10 TABLET ORAL at 22:18

## 2017-05-19 RX ADMIN — METFORMIN HYDROCHLORIDE SCH: 500 TABLET ORAL at 17:13

## 2017-05-19 RX ADMIN — ACETAMINOPHEN PRN MG: 325 TABLET ORAL at 17:14

## 2017-05-19 RX ADMIN — BACLOFEN SCH MG: 10 TABLET ORAL at 09:43

## 2017-05-19 RX ADMIN — SILVER SULFADIAZINE SCH APPLIC: 10 CREAM TOPICAL at 10:02

## 2017-05-19 RX ADMIN — SODIUM CHLORIDE SCH: 9 INJECTION, SOLUTION INTRAVENOUS at 17:13

## 2017-05-19 RX ADMIN — SODIUM CHLORIDE SCH MLS/HR: 9 INJECTION, SOLUTION INTRAVENOUS at 09:44

## 2017-05-19 RX ADMIN — GABAPENTIN SCH: 300 CAPSULE ORAL at 22:18

## 2017-05-19 RX ADMIN — SENNOSIDES SCH TAB: 8.6 TABLET, FILM COATED ORAL at 09:42

## 2017-05-19 RX ADMIN — PIPERACILLIN SODIUM,TAZOBACTAM SODIUM SCH MLS/HR: 3; .375 INJECTION, POWDER, FOR SOLUTION INTRAVENOUS at 09:42

## 2017-05-19 RX ADMIN — SODIUM CHLORIDE SCH MLS/HR: 9 INJECTION, SOLUTION INTRAVENOUS at 22:20

## 2017-05-19 RX ADMIN — Medication SCH ML: at 17:13

## 2017-05-19 RX ADMIN — BACLOFEN SCH: 10 TABLET ORAL at 11:24

## 2017-05-19 RX ADMIN — SENNOSIDES SCH TAB: 8.6 TABLET, FILM COATED ORAL at 23:55

## 2017-05-19 RX ADMIN — Medication SCH MG: at 09:43

## 2017-05-19 NOTE — PN
Progress Note, Physician


Chief Complaint: 


AWAKE ALERT


AWAITING BIOPSY RESULT





- Current Medication List


Current Medications: 


Active Medications





Acetaminophen (Tylenol -)  650 mg PO Q6H PRN


   PRN Reason: FEVER OR PAIN


   Last Admin: 05/18/17 17:27 Dose:  650 mg


Amino Acids (Prosource No Carb Liquid Pkt)  30 ml PO BID@0800,1730 [facility]


   Last Admin: 05/19/17 08:42 Dose:  30 ml


Baclofen (Lioresal -)  10 mg PO BID [facility]


   Last Admin: 05/19/17 09:43 Dose:  10 mg


Gabapentin (Neurontin -)  300 mg PO BID [facility]


   Last Admin: 05/18/17 21:37 Dose:  Not Given


Sodium Chloride (Normal Saline -)  1,000 mls @ 75 mls/hr IV ASDIR [facility]


   Last Admin: 05/19/17 09:44 Dose:  75 mls/hr


Piperacillin Sod/Tazobactam Sod (Zosyn 3.375gm Ivpb (Pre-Docked))  50 mls @ 100 

mls/hr IVPB Q8H-IV [facility]


   PRN Reason: Protocol


   Last Admin: 05/19/17 09:42 Dose:  100 mls/hr


Metformin HCl (Glucophage -)  1,000 mg PO BID@0700,1630 [facility]


   Last Admin: 05/19/17 07:21 Dose:  Not Given


Multivitamins/Minerals/Vitamin C (Tab-A-Vit -)  1 tab PO DAILY [facility]


   Last Admin: 05/19/17 09:43 Dose:  1 tab


Senna (Senna -)  1 tab PO BID [facility]


   Last Admin: 05/19/17 09:42 Dose:  1 tab


Silver Sulfadiazine (Silvadene -)  1 applic TP BID [facility]


   Last Admin: 05/18/17 23:31 Dose:  1 applic


Zinc Sulfate (Orazinc -)  220 mg PO DAILY [facility]


   Last Admin: 05/19/17 09:43 Dose:  220 mg











- Objective


Vital Signs: 


 Vital Signs











Temperature  98.2 F   05/19/17 06:00


 


Pulse Rate  69   05/19/17 06:00


 


Respiratory Rate  18   05/19/17 06:00


 


Blood Pressure  93/56   05/19/17 06:00


 


O2 Sat by Pulse Oximetry (%)  99   05/18/17 21:00











Constitutional: Yes: Mild Distress


Eyes: Yes: WNL


HENT: Yes: WNL


Neck: Yes: WNL


Cardiovascular: Yes: WNL


Respiratory: Yes: WNL


Gastrointestinal: Yes: WNL


Genitourinary: Yes: Other


Musculoskeletal: Yes: Muscle Weakness


Extremities: Yes: WNL


Edema: No


Integumentary: Yes: Pressure Ulcer, Other


Wound/Incision: Yes: Dressing Dry and Intact, Unapproximated


Neurological: Yes: Pre-Existing Deficit, Weakness


...Motor Strength: LLE, RLE


Labs: 


 CBC, BMP





 05/19/17 05:35 





 05/19/17 05:35 





 INR, PTT











INR  1.43  (0.82-1.09)  H  05/11/17  11:16    














Problem List





- Problems


(1) Paraplegia following spinal cord injury


Code(s): G82.20 - PARAPLEGIA, UNSPECIFIED





(2) Decubitus ulcer


Code(s): L89.90 - PRESSURE ULCER OF UNSPECIFIED SITE, UNSPECIFIED STAGE   

Qualifiers: 


     Pressure ulcer location: sacral region     Pressure ulcer stage: stage 4  

      Qualified Code(s): L89.154 - Pressure ulcer of sacral region, stage 4  





(3) Diabetes


Code(s): E11.9 - TYPE 2 DIABETES MELLITUS WITHOUT COMPLICATIONS   Qualifiers: 


     Diabetes mellitus type: type 2     Diabetes mellitus complication status: 

with unspecified complications





(4) Hypertension


Code(s): I10 - ESSENTIAL (PRIMARY) HYPERTENSION   





(5) Sepsis


Code(s): A41.9 - SEPSIS, UNSPECIFIED ORGANISM   








Assessment/Plan


SURGICAL DEBRIDEMENT YESTERDAY


AWAIT BIOPSY/WOUND CULTURE RESULT FOR SENSITIVITY


+OSTEOMYELITIS ON TRIPHASE SCAN


WILL NEED PICC LINE


LONG TERM ABX


WOUND CARE

## 2017-05-20 RX ADMIN — GABAPENTIN SCH MG: 300 CAPSULE ORAL at 23:00

## 2017-05-20 RX ADMIN — Medication SCH MG: at 09:16

## 2017-05-20 RX ADMIN — SENNOSIDES SCH TAB: 8.6 TABLET, FILM COATED ORAL at 23:00

## 2017-05-20 RX ADMIN — BACLOFEN SCH MG: 10 TABLET ORAL at 23:00

## 2017-05-20 RX ADMIN — GABAPENTIN SCH MG: 300 CAPSULE ORAL at 09:16

## 2017-05-20 RX ADMIN — Medication SCH ML: at 08:40

## 2017-05-20 RX ADMIN — SENNOSIDES SCH TAB: 8.6 TABLET, FILM COATED ORAL at 09:16

## 2017-05-20 RX ADMIN — BACLOFEN SCH MG: 10 TABLET ORAL at 09:16

## 2017-05-20 RX ADMIN — PIPERACILLIN SODIUM,TAZOBACTAM SODIUM SCH MLS/HR: 3; .375 INJECTION, POWDER, FOR SOLUTION INTRAVENOUS at 09:16

## 2017-05-20 RX ADMIN — SODIUM CHLORIDE SCH: 9 INJECTION, SOLUTION INTRAVENOUS at 17:49

## 2017-05-20 RX ADMIN — SODIUM CHLORIDE SCH MLS/HR: 9 INJECTION, SOLUTION INTRAVENOUS at 12:25

## 2017-05-20 RX ADMIN — MULTIVITAMIN TABLET SCH TAB: TABLET at 09:16

## 2017-05-20 RX ADMIN — BACLOFEN SCH MG: 10 TABLET ORAL at 00:02

## 2017-05-20 RX ADMIN — SILVER SULFADIAZINE SCH APPLIC: 10 CREAM TOPICAL at 09:19

## 2017-05-20 RX ADMIN — METFORMIN HYDROCHLORIDE SCH: 500 TABLET ORAL at 06:03

## 2017-05-20 RX ADMIN — Medication SCH ML: at 17:50

## 2017-05-20 RX ADMIN — PIPERACILLIN SODIUM,TAZOBACTAM SODIUM SCH MLS/HR: 3; .375 INJECTION, POWDER, FOR SOLUTION INTRAVENOUS at 02:20

## 2017-05-20 RX ADMIN — METFORMIN HYDROCHLORIDE SCH: 500 TABLET ORAL at 17:47

## 2017-05-20 RX ADMIN — SILVER SULFADIAZINE SCH APPLIC: 10 CREAM TOPICAL at 23:00

## 2017-05-20 RX ADMIN — PIPERACILLIN SODIUM,TAZOBACTAM SODIUM SCH MLS/HR: 3; .375 INJECTION, POWDER, FOR SOLUTION INTRAVENOUS at 17:49

## 2017-05-20 NOTE — PN
Progress Note, Physician


Chief Complaint: 


ASLEEP


COMFORTABLE





- Current Medication List


Current Medications: 


Active Medications





Acetaminophen (Tylenol -)  650 mg PO Q6H PRN


   PRN Reason: FEVER OR PAIN


   Last Admin: 05/19/17 17:14 Dose:  650 mg


Amino Acids (Prosource No Carb Liquid Pkt)  30 ml PO BID@0800,1730 [facility]


   Last Admin: 05/20/17 08:40 Dose:  30 ml


Baclofen (Lioresal -)  10 mg PO BID [facility]


   Last Admin: 05/20/17 09:16 Dose:  10 mg


Gabapentin (Neurontin -)  300 mg PO BID [facility]


   Last Admin: 05/20/17 09:16 Dose:  300 mg


Sodium Chloride (Normal Saline -)  1,000 mls @ 75 mls/hr IV ASDIR [facility]


   Last Admin: 05/20/17 12:25 Dose:  75 mls/hr


Piperacillin Sod/Tazobactam Sod (Zosyn 3.375gm Ivpb (Pre-Docked))  50 mls @ 100 

mls/hr IVPB Q8H-IV [facility]


   PRN Reason: Protocol


   Last Admin: 05/20/17 09:16 Dose:  100 mls/hr


Metformin HCl (Glucophage -)  1,000 mg PO BID@0700,1630 [facility]


   Last Admin: 05/20/17 06:03 Dose:  Not Given


Multivitamins/Minerals/Vitamin C (Tab-A-Vit -)  1 tab PO DAILY [facility]


   Last Admin: 05/20/17 09:16 Dose:  1 tab


Senna (Senna -)  1 tab PO BID [facility]


   Last Admin: 05/20/17 09:16 Dose:  1 tab


Silver Sulfadiazine (Silvadene -)  1 applic TP BID [facility]


   Last Admin: 05/20/17 09:19 Dose:  1 applic


Zinc Sulfate (Orazinc -)  220 mg PO DAILY [facility]


   Last Admin: 05/20/17 09:16 Dose:  220 mg











- Objective


Vital Signs: 


 Vital Signs











Temperature  98.1 F   05/20/17 14:00


 


Pulse Rate  72   05/20/17 14:00


 


Respiratory Rate  20   05/20/17 14:00


 


Blood Pressure  111/57   05/20/17 14:00


 


O2 Sat by Pulse Oximetry (%)  98   05/20/17 09:00











Constitutional: Yes: No Distress


Eyes: Yes: WNL


HENT: Yes: WNL


Neck: Yes: WNL


Cardiovascular: Yes: WNL


Respiratory: Yes: WNL


Gastrointestinal: Yes: WNL


Genitourinary: Yes: Incontinence


Musculoskeletal: Yes: Muscle Weakness


Extremities: Yes: Deformity


Edema: No


Integumentary: Yes: Pressure Ulcer


Wound/Incision: Yes: Dressing Dry and Intact


Neurological: Yes: Pre-Existing Deficit


...Motor Strength: LLE, RLE


Psychiatric: Yes: Other


Labs: 


 CBC, BMP





 05/19/17 05:35 





 05/19/17 05:35 





 INR, PTT











INR  1.43  (0.82-1.09)  H  05/11/17  11:16    














Problem List





- Problems


(1) Paraplegia following spinal cord injury


Code(s): G82.20 - PARAPLEGIA, UNSPECIFIED





(2) Decubitus ulcer


Code(s): L89.90 - PRESSURE ULCER OF UNSPECIFIED SITE, UNSPECIFIED STAGE   

Qualifiers: 


     Pressure ulcer location: sacral region     Pressure ulcer stage: stage 4  

      Qualified Code(s): L89.154 - Pressure ulcer of sacral region, stage 4  





(3) Diabetes


Code(s): E11.9 - TYPE 2 DIABETES MELLITUS WITHOUT COMPLICATIONS   Qualifiers: 


     Diabetes mellitus type: type 2     Diabetes mellitus complication status: 

with unspecified complications





(4) Hypertension


Code(s): I10 - ESSENTIAL (PRIMARY) HYPERTENSION   





(5) Sepsis


Code(s): A41.9 - SEPSIS, UNSPECIFIED ORGANISM   








Assessment/Plan


[facility] COAG NEG ON SURGICAL WOUND CULTURE


AWAIT SENSITIVITY


PICC LINE MONDAY WITH ABX FOR 4-6 WEEKS PER ID

## 2017-05-21 RX ADMIN — SILVER SULFADIAZINE SCH APPLIC: 10 CREAM TOPICAL at 21:40

## 2017-05-21 RX ADMIN — MULTIVITAMIN TABLET SCH TAB: TABLET at 10:19

## 2017-05-21 RX ADMIN — SENNOSIDES SCH TAB: 8.6 TABLET, FILM COATED ORAL at 21:39

## 2017-05-21 RX ADMIN — PIPERACILLIN SODIUM,TAZOBACTAM SODIUM SCH MLS/HR: 3; .375 INJECTION, POWDER, FOR SOLUTION INTRAVENOUS at 18:13

## 2017-05-21 RX ADMIN — BACLOFEN SCH MG: 10 TABLET ORAL at 21:39

## 2017-05-21 RX ADMIN — Medication SCH ML: at 17:01

## 2017-05-21 RX ADMIN — GABAPENTIN SCH MG: 300 CAPSULE ORAL at 10:20

## 2017-05-21 RX ADMIN — BACLOFEN SCH MG: 10 TABLET ORAL at 10:20

## 2017-05-21 RX ADMIN — METFORMIN HYDROCHLORIDE SCH: 500 TABLET ORAL at 17:00

## 2017-05-21 RX ADMIN — ACETAMINOPHEN PRN MG: 325 TABLET ORAL at 16:57

## 2017-05-21 RX ADMIN — PIPERACILLIN SODIUM,TAZOBACTAM SODIUM SCH MLS/HR: 3; .375 INJECTION, POWDER, FOR SOLUTION INTRAVENOUS at 01:53

## 2017-05-21 RX ADMIN — Medication SCH ML: at 10:18

## 2017-05-21 RX ADMIN — SILVER SULFADIAZINE SCH APPLIC: 10 CREAM TOPICAL at 10:20

## 2017-05-21 RX ADMIN — SODIUM CHLORIDE SCH MLS/HR: 9 INJECTION, SOLUTION INTRAVENOUS at 17:00

## 2017-05-21 RX ADMIN — GABAPENTIN SCH: 300 CAPSULE ORAL at 21:33

## 2017-05-21 RX ADMIN — Medication SCH MG: at 10:20

## 2017-05-21 RX ADMIN — PIPERACILLIN SODIUM,TAZOBACTAM SODIUM SCH MLS/HR: 3; .375 INJECTION, POWDER, FOR SOLUTION INTRAVENOUS at 10:18

## 2017-05-21 RX ADMIN — SENNOSIDES SCH TAB: 8.6 TABLET, FILM COATED ORAL at 10:19

## 2017-05-21 RX ADMIN — METFORMIN HYDROCHLORIDE SCH: 500 TABLET ORAL at 06:29

## 2017-05-21 NOTE — PN
Progress Note, Physician


Chief Complaint: 


AWAKE ALERT NAD





- Current Medication List


Current Medications: 


Active Medications





Acetaminophen (Tylenol -)  650 mg PO Q6H PRN


   PRN Reason: FEVER OR PAIN


   Last Admin: 05/21/17 16:57 Dose:  650 mg


Amino Acids (Prosource No Carb Liquid Pkt)  30 ml PO BID@0800,1730 [facility]


   Last Admin: 05/21/17 17:01 Dose:  30 ml


Baclofen (Lioresal -)  10 mg PO BID [facility]


   Last Admin: 05/21/17 10:20 Dose:  10 mg


Gabapentin (Neurontin -)  300 mg PO BID [facility]


   Last Admin: 05/21/17 10:20 Dose:  300 mg


Sodium Chloride (Normal Saline -)  1,000 mls @ 75 mls/hr IV ASDIR [facility]


   Last Admin: 05/21/17 17:00 Dose:  75 mls/hr


Piperacillin Sod/Tazobactam Sod (Zosyn 3.375gm Ivpb (Pre-Docked))  50 mls @ 100 

mls/hr IVPB Q8H-IV [facility]


   PRN Reason: Protocol


   Last Admin: 05/21/17 18:13 Dose:  100 mls/hr


Metformin HCl (Glucophage -)  1,000 mg PO BID@0700,1630 [facility]


   Last Admin: 05/21/17 17:00 Dose:  Not Given


Multivitamins/Minerals/Vitamin C (Tab-A-Vit -)  1 tab PO DAILY [facility]


   Last Admin: 05/21/17 10:19 Dose:  1 tab


Senna (Senna -)  1 tab PO BID [facility]


   Last Admin: 05/21/17 10:19 Dose:  1 tab


Silver Sulfadiazine (Silvadene -)  1 applic TP BID [facility]


   Last Admin: 05/21/17 10:20 Dose:  1 applic


Zinc Sulfate (Orazinc -)  220 mg PO DAILY [facility]


   Last Admin: 05/21/17 10:20 Dose:  220 mg











- Objective


Vital Signs: 


 Vital Signs











Temperature  98.5 F   05/21/17 14:00


 


Pulse Rate  88   05/21/17 14:00


 


Respiratory Rate  20   05/21/17 14:00


 


Blood Pressure  94/48   05/21/17 14:00


 


O2 Sat by Pulse Oximetry (%)  98   05/21/17 09:00











Constitutional: Yes: Mild Distress


Eyes: Yes: WNL


HENT: Yes: WNL


Neck: Yes: WNL


Respiratory: Yes: WNL


Gastrointestinal: Yes: WNL


Genitourinary: Yes: Incontinence


Musculoskeletal: Yes: Muscle Weakness


Extremities: Yes: Deformity


Edema: No


Peripheral Pulses WNL: Yes


Integumentary: Yes: Pressure Ulcer


Wound/Incision: Yes: Dressing Removed


Neurological: Yes: Pre-Existing Deficit


...Motor Strength: LLE, RLE


Labs: 


 CBC, BMP





 05/19/17 05:35 





 05/19/17 05:35 





 INR, PTT











INR  1.43  (0.82-1.09)  H  05/11/17  11:16    














Problem List





- Problems


(1) Paraplegia following spinal cord injury


Code(s): G82.20 - PARAPLEGIA, UNSPECIFIED





(2) Decubitus ulcer


Code(s): L89.90 - PRESSURE ULCER OF UNSPECIFIED SITE, UNSPECIFIED STAGE   

Qualifiers: 


     Pressure ulcer location: sacral region     Pressure ulcer stage: stage 4  

      Qualified Code(s): L89.154 - Pressure ulcer of sacral region, stage 4  





(3) Diabetes


Code(s): E11.9 - TYPE 2 DIABETES MELLITUS WITHOUT COMPLICATIONS   Qualifiers: 


     Diabetes mellitus type: type 2     Diabetes mellitus complication status: 

with unspecified complications





(4) Hypertension


Code(s): I10 - ESSENTIAL (PRIMARY) HYPERTENSION   





(5) Sepsis


Code(s): A41.9 - SEPSIS, UNSPECIFIED ORGANISM   








Assessment/Plan


STAGE 4 ULCER RIGHT HIP/BUTTOCK


IV ABX ONCE CULTURE IS BACK


ID CONSULT


PICC LINE

## 2017-05-22 VITALS — TEMPERATURE: 99.9 F | HEART RATE: 89 BPM | DIASTOLIC BLOOD PRESSURE: 57 MMHG | SYSTOLIC BLOOD PRESSURE: 98 MMHG

## 2017-05-22 PROCEDURE — 02HV33Z INSERTION OF INFUSION DEVICE INTO SUPERIOR VENA CAVA, PERCUTANEOUS APPROACH: ICD-10-PCS | Performed by: RADIOLOGY

## 2017-05-22 RX ADMIN — METFORMIN HYDROCHLORIDE SCH: 500 TABLET ORAL at 16:26

## 2017-05-22 RX ADMIN — GABAPENTIN SCH MG: 300 CAPSULE ORAL at 09:20

## 2017-05-22 RX ADMIN — PIPERACILLIN SODIUM,TAZOBACTAM SODIUM SCH MLS/HR: 3; .375 INJECTION, POWDER, FOR SOLUTION INTRAVENOUS at 02:46

## 2017-05-22 RX ADMIN — Medication SCH ML: at 09:24

## 2017-05-22 RX ADMIN — SODIUM CHLORIDE SCH: 9 INJECTION, SOLUTION INTRAVENOUS at 16:33

## 2017-05-22 RX ADMIN — SENNOSIDES SCH TAB: 8.6 TABLET, FILM COATED ORAL at 09:20

## 2017-05-22 RX ADMIN — ACETAMINOPHEN PRN MG: 325 TABLET ORAL at 13:54

## 2017-05-22 RX ADMIN — BACLOFEN SCH MG: 10 TABLET ORAL at 09:20

## 2017-05-22 RX ADMIN — Medication SCH: at 16:33

## 2017-05-22 RX ADMIN — METFORMIN HYDROCHLORIDE SCH: 500 TABLET ORAL at 06:17

## 2017-05-22 RX ADMIN — MULTIVITAMIN TABLET SCH TAB: TABLET at 09:20

## 2017-05-22 RX ADMIN — SODIUM CHLORIDE SCH MLS/HR: 9 INJECTION, SOLUTION INTRAVENOUS at 02:46

## 2017-05-22 RX ADMIN — Medication SCH MG: at 09:20

## 2017-05-22 RX ADMIN — PIPERACILLIN SODIUM,TAZOBACTAM SODIUM SCH MLS/HR: 3; .375 INJECTION, POWDER, FOR SOLUTION INTRAVENOUS at 09:20

## 2017-05-22 RX ADMIN — SILVER SULFADIAZINE SCH APPLIC: 10 CREAM TOPICAL at 15:12

## 2017-05-22 NOTE — DS
Physical Examination


Vital Signs: 


 Vital Signs











Temperature  98.4 F   05/22/17 10:00


 


Pulse Rate  86   05/22/17 10:00


 


Respiratory Rate  20   05/22/17 10:00


 


Blood Pressure  98/59   05/22/17 10:00


 


O2 Sat by Pulse Oximetry (%)  94 L  05/22/17 09:00











Constitutional: Yes: Mild Distress, Obese


HENT: Yes: WNL, Thrush


Cardiovascular: Yes: WNL


Respiratory: Yes: WNL


Gastrointestinal: Yes: WNL


Musculoskeletal: Yes: Muscle Weakness


Extremities: Yes: Deformity


Edema: No


Peripheral Pulses WNL: Yes


Integumentary: Yes: Pressure Ulcer


Wound/Incision: Yes: Dressing Dry and Intact


Neurological: Yes: Pre-Existing Deficit, Unsteady Gait, Weakness, Other


...Motor Strength: LLE, RLE


Psychiatric: Yes: Other


Labs: 


 CBC, BMP





 05/19/17 05:35 





 05/19/17 05:35 











Discharge Summary


Reason For Visit: SEPSIS


Current Active Problems





Decubitus ulcer (Acute) 


Decubitus ulcer of right ischial area (Acute) 


Diabetes (Acute) 


Hypertension (Acute) 


Hyponatremia (Acute) 


Lactic acid acidosis (Acute) 


Paraplegia following spinal cord injury (Acute) 


Sepsis (Acute) 








Procedures: Principal: SURGERY


Other Procedures: BONE SCAN


Hospital Course: 


IV ABX, PLASTIC SURGERY DRAINAGE WILL NEED GRAFT AND F/U APPT WITH PLASTICS


s/p drainage abscess


osteomyelitis


cultures reviewed


plan vancomycin/rocephin po flagyl for 6 weeks





will get esr/crp for baseline


please follow labs/vancomycin level, esr/crp at NH








- Instructions


Diet, Activity, Other Instructions: 


s/p drainage abscess


osteomyelitis


cultures reviewed


plan vancomycin/rocephin po flagyl for 6 weeks





will get esr/crp for baseline


please follow labs/vancomycin level, esr/crp at NH





ADA DIET


GRAFT AND F/U WITH PLASTIC SURGERY





Referrals: 


Chris Tejeda [Primary Care Provider] - 


Disposition: SKILLED NURSING FACILITY





- Home Medications


Comprehensive Discharge Medication List: 


Ambulatory Orders





Acetaminophen [Tylenol] 650 mg PO Q6H PRN 05/11/17 


Alendronate Na [Fosamax] 70 mg PO WEEKLY 05/11/17 


Ammonium Lactate Lotion [Lac-Hydrin 12% Lotion -] 1 applic TP ASDIR 05/11/17 


Calcium 250Mg/Vit-D 125 Units [Oscal 250 mg+D -] 1 combo PO DAILY 05/11/17 





LABS/VANCO LEVELS


GET ESR AND CRP STAT FOR TOMORROW AT [facility] AND FOLLOW LEVELS WEEKLY


s/p drainage abscess


osteomyelitis


cultures reviewed


plan vancomycin/rocephin po flagyl for 6 weeks





will get esr/crp for baseline


please follow labs/vancomycin level, esr/crp at NH

## 2017-05-22 NOTE — PN
Progress Note (short form)





- Note


Progress Note: 


asked to see for f/u


he is s/p debridement and bone bioposy 5/17


bone scan positive of osteomyelitis right ischial tuberosity /inferior pubic 

ramus





 Vital Signs











 Period  Temp  Pulse  Resp  BP Sys/L[place]ola  Pulse Ox


 


 Last 24 Hr  98.6 F-101.3 F  66-91  20-20  /50-80  94-95








cor-rrr


lungs clear


abd soft,nt





wound - no cellulitis, still some drainage from ulcer


 CBC, BMP





 05/19/17 05:35 





 05/19/17 05:35 





 Microbiology





05/17/17 16:00   Bone   Gram Stain - Final


05/17/17 16:00   Bone   Tissue Culture - Final


                            Staphylococcus Epidermidis


05/17/17 16:00   Bone   Anaerobic Culture - Final


                            NO ANAEROBES WERE ISOLATED


05/11/17 16:30   Decubiti   Gram Stain - Final


05/11/17 16:30   Decubiti   Wound Culture - Final


                            Providencia Stuartii


                            Enterococcus Faecalis


                            Staphylococcus Coagulase Neg


                            Diphtheroid/Corynebacterium


                            Escherichia Coli


                            Bacteroides Fragilis


05/11/17 11:16   Blood - Peripheral Venous   Blood Culture - Final


                            NO GROWTH AFTER 5 DAYS INCUBATION


05/11/17 11:16   Blood - Peripheral Venous   Blood Culture - Final


                            NO GROWTH AFTER 5 DAYS INCUBATION


05/11/17 13:30   Urine - Urine Clean Catch   Urine Culture - Final


                            NO GROWTH OBTAINED





a/p


s/p drainage abscess


osteomyelitis


cultures reviewed


plan vancomycin/rocephin po flagyl for 6 weeks





will get esr/crp for baseline


please follow labs/vancomycin level, esr/crp at NH

## 2017-05-24 NOTE — PATH
Surgical Pathology Report



Patient Name:  TIM HERR

Accession #:  [id number]

[facility]. Rec. #:  [id number]                                                        

   /Age/Gender:  1975 (Age: 41) / M

Account:  [id number]                                                          

             Location: [facility]

Taken:  2017

Received:  2017

Reported:  2017

Physicians:  ROMEO Lorenzo M.D.

  



Specimen(s) Received

 TISSUE BX RIGHT ISCHIUM 





Clinical History

History quadriplegia x20 years, has an infected right ischial decubitus x1 year







Final Diagnosis

BONE AND SOFT TISSUE, RIGHT ISCHIUM, BIOPSY:

BONE WITH ACUTE AND CHRONIC OSTEOMYELITIS, AND SOFT TISSUE WITH GANGRENOUS

NECROSIS.





***Electronically Signed***

Adam Vu M.D.





Gross Description

Received in formalin labeled "tissue biopsy right ischium," is a 0.4 x 0.3 x 0.2

cm tan-brown, irregular portion of hard tissue, possibly consistent with bone.

The specimen is submitted in toto in one cassette, following decalcification.





[facility]

## 2017-06-16 ENCOUNTER — HOSPITAL ENCOUNTER (INPATIENT)
Dept: HOSPITAL 74 - JER | Age: 42
LOS: 7 days | Discharge: NURSING, REHAB OR LONG-TERM CARE | DRG: 721 | End: 2017-06-23
Attending: FAMILY MEDICINE | Admitting: FAMILY MEDICINE
Payer: COMMERCIAL

## 2017-06-16 VITALS — BODY MASS INDEX: 32.4 KG/M2

## 2017-06-16 DIAGNOSIS — Z79.84: ICD-10-CM

## 2017-06-16 DIAGNOSIS — E11.69: ICD-10-CM

## 2017-06-16 DIAGNOSIS — L89.312: ICD-10-CM

## 2017-06-16 DIAGNOSIS — G95.20: ICD-10-CM

## 2017-06-16 DIAGNOSIS — R78.81: ICD-10-CM

## 2017-06-16 DIAGNOSIS — L89.154: ICD-10-CM

## 2017-06-16 DIAGNOSIS — G82.50: ICD-10-CM
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
## 2017-06-16 DIAGNOSIS — Z87.440: ICD-10-CM

## 2017-06-16 DIAGNOSIS — T81.4XXA: Primary | ICD-10-CM

## 2017-06-16 DIAGNOSIS — M86.8X8: ICD-10-CM

## 2017-06-16 DIAGNOSIS — M81.0: ICD-10-CM

## 2017-06-16 LAB
ALBUMIN SERPL-MCNC: 2.8 G/DL (ref 3.4–5)
ALP SERPL-CCNC: 76 U/L (ref 45–117)
ALT SERPL-CCNC: 8 U/L (ref 12–78)
ANION GAP SERPL CALC-SCNC: 11 MMOL/L (ref 8–16)
AST SERPL-CCNC: 15 U/L (ref 15–37)
BASOPHILS # BLD: 1.4 % (ref 0–2)
BILIRUB SERPL-MCNC: 0.3 MG/DL (ref 0.2–1)
CALCIUM SERPL-MCNC: 8.2 MG/DL (ref 8.5–10.1)
CO2 SERPL-SCNC: 28 MMOL/L (ref 21–32)
CREAT SERPL-MCNC: 0.3 MG/DL (ref 0.7–1.3)
DEPRECATED RDW RBC AUTO: 17.3 % (ref 11.9–15.9)
EOSINOPHIL # BLD: 0.7 % (ref 0–4.5)
GLUCOSE SERPL-MCNC: 88 MG/DL (ref 74–106)
MCH RBC QN AUTO: 23.5 PG (ref 25.7–33.7)
MCHC RBC AUTO-ENTMCNC: 32.1 G/DL (ref 32–35.9)
MCV RBC: 73.3 FL (ref 80–96)
NEUTROPHILS # BLD: 69.6 % (ref 42.8–82.8)
PLATELET # BLD AUTO: 348 K/MM3 (ref 134–434)
PMV BLD: 7 FL (ref 7.5–11.1)
PROT SERPL-MCNC: 7.4 G/DL (ref 6.4–8.2)
WBC # BLD AUTO: 10.5 K/MM3 (ref 4–10)

## 2017-06-16 RX ADMIN — INSULIN ASPART SCH: 100 INJECTION, SOLUTION INTRAVENOUS; SUBCUTANEOUS at 21:42

## 2017-06-16 RX ADMIN — HEPARIN SODIUM SCH UNIT: 5000 INJECTION, SOLUTION INTRAVENOUS; SUBCUTANEOUS at 21:33

## 2017-06-16 RX ADMIN — SODIUM CHLORIDE SCH MLS/HR: 9 INJECTION, SOLUTION INTRAVENOUS at 21:43

## 2017-06-16 RX ADMIN — SENNOSIDES SCH TAB: 8.6 TABLET, FILM COATED ORAL at 21:33

## 2017-06-16 RX ADMIN — Medication SCH MG: at 21:33

## 2017-06-16 RX ADMIN — BACLOFEN SCH MG: 10 TABLET ORAL at 21:33

## 2017-06-16 NOTE — PDOC
History of Present Illness





- General


Chief Complaint: Wound Infection


Stated Complaint: WOUND


Time Seen by Provider: 06/16/17 15:05


History Source: Patient


Exam Limitations: No Limitations





- History of Present Illness


Initial Comments: 


06/16/17 15:47


Patient is a 42 year old male with PMH of R decubitus ulcer, quadriplegia, DM 

and recurrent UTIs, here 5/12/17 for sepsis due to decubitus ulcer (s/p 

debridement, picc, vanco/rocephin x 6w), who presents to the ED from [facility] due to signs of infection. Patient states that he has been feeling well, 

without malaise f/c or pain, had been getting wound care and abx infusions. one 

of his attending doctors, however noticed purulent discharge and foul smell 

from ulcer. He denies h/a, confusion, chest pain, sob, abd pain, diarrhea, 

dysuria.  





06/16/17 15:49








06/16/17 16:24








06/16/17 16:59








Past History





- Travel


Traveled outside of the country in the last 30 days: No


Close contact w/someone who was outside of country & ill: No





- Past Medical History


Allergies/Adverse Reactions: 


 Allergies











Allergy/AdvReac Type Severity Reaction Status Date / Time


 


levofloxacin Allergy Unknown  Verified 06/16/17 14:50











Home Medications: 


Ambulatory Orders





Acetaminophen [Tylenol] 650 mg PO Q6H PRN 05/11/17 


Alendronate Na [Fosamax (Weekly)] 70 mg PO WEEKLY 05/11/17 


Ammonium Lactate Lotion [Lac-Hydrin 12] 1 applic TP ASDIR 05/11/17 


Calcium 250Mg/Vit-D 125 Units [Oscal 250 mg+D -] 1 combo PO DAILY 05/11/17 


Acetaminophen [Tylenol .Regular Strength -] 650 mg PO Q6H PRN #0 tablet 05/22/ 17 


Amino Acids/Protein Hydrolys [Prosource No Carb Liquid Pkt] 30 ml PO BID@0800,

1730  packet 05/22/17 


Baclofen [Lioresal -] 10 mg PO BID  tablet 05/22/17 


Metformin HCl [Glucophage -] 1,000 mg PO BID@0700,1630  tablet 05/22/17 


Multivitamins [Multivit ([facility] Formulary)] 1 tab PO DAILY  tab 05/22/17 


Picc Line Flush [Picc Line Flush -] 8 ml IVPUSH PRN PRN #0 ml 05/22/17 


Sennosides [Senna -] 1 tab PO BID  tablet 05/22/17 


Silver Sulfadiazine 1% Top Cr [Silvadene -] 1 applic TP BID  jar 05/22/17 


Zinc Sulfate [Zinc-220] 220 mg PO DAILY 06/16/17 








Diabetes: Yes


 Disorders: Yes (Frequent UTI's)





- Psycho/Social/Smoking Cessation Hx


Anxiety: No


Suicidal Ideation: No


Smoking History: Never smoked


Have you smoked in the past 12 months: No


Information on smoking cessation initiated: No


Hx Alcohol Use: No


Drug/Substance Use Hx: No


Substance Use Type: None


Hx Substance Use Treatment: No





**Review of Systems





- Review of Systems


Able to Perform ROS?: Yes


Is the patient limited English proficient: No


Constitutional: No: Chills, Fever


HEENTM: No: Blurred Vision, Nose Congestion, Tinnitus, Throat Pain, Difficulty 

Swallowing


Respiratory: No: Symptoms reported, Cough, Orthopnea, Shortness of Breath, 

Wheezing


Cardiac (ROS): No: Chest Pain, Edema, Lightheadedness, Palpitations


ABD/GI: No: Abdominal Distended, Constipated, Diarrhea, Nausea, Vomiting, 

Abdominal cramping


: No: Dysuria, Frequency, Flank Pain


Musculoskeletal: No: Back Pain


Integumentary: No: Erythema, Flushing, Rash, Sweating


Neurological: No: Headache, Numbness, Paresthesia


Psychiatric: No: Anxiety


Endocrine: No: Unexplained Weight Loss


Hematologic/Lymphatic: No: Anemia, Blood Clots, Easy Bleeding, Easy Bruising


All Other Systems: Reviewed and Negative





*Physical Exam





- Vital Signs


 Last Vital Signs











Temp Pulse Resp BP Pulse Ox


 


 99.6 F   83   20   142/80   98 


 


 06/16/17 14:46  06/16/17 14:46  06/16/17 14:46  06/16/17 14:46  06/16/17 14:46














- Physical Exam


Comments: 





06/16/17 16:32


GENERAL:NAD AAOx3


HEENT:atraumatic, normocephalic, perrla, eomi


CV: rrr s1s2


PULM: cta b/l


GI:soft, nontender, nondistended, normoactive bowel sounds, no mass 


Skin: Grade 3 R gluteal fold ulcer, clean pink base, no odor, clear serous 

discarge. 


Neuro: quadriplegic, moderate function in arms, minimal function in legs. CN ii-

vii grossly intact  











ED Treatment Course





- LABORATORY


CBC & Chemistry Diagram: 


 06/16/17 16:30





 06/16/17 16:30





Medical Decision Making





- Medical Decision Making


06/16/17 16:45


patient presents with R decubitus ulcer which was debrided about 3 weeks ago is 

currently on abx half way through course. Ulcer appears uninfected. Patient 

afebrile with stable vitals. Will order cbc cmp and call Dr Monroe for further 

instructions.  





Spoke to Dr Monroe, he saw patient earlier today, states there was a lot of 

purulence, ulcer must have a loculation that needs to be broken and drained. 

Patient to be admitted for surgical I and D. Will consult Dr Chowdhury





06/16/17 16:57








06/16/17 16:57








06/16/17 16:59








06/16/17 17:36


labs appreciated. white count 10.5, lower than when last discharged. 





*DC/Admit/Observation/Transfer


Diagnosis at time of Disposition: 


 Decubitus ulcer, Decubitus ulcer of right ischial area





- Discharge Dispostion


Admit: Yes
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
## 2017-06-17 LAB
ALBUMIN SERPL-MCNC: 2.6 G/DL (ref 3.4–5)
ALP SERPL-CCNC: 64 U/L (ref 45–117)
ALT SERPL-CCNC: 8 U/L (ref 12–78)
ANION GAP SERPL CALC-SCNC: 11 MMOL/L (ref 8–16)
AST SERPL-CCNC: 14 U/L (ref 15–37)
BASOPHILS # BLD: 1.2 % (ref 0–2)
BILIRUB SERPL-MCNC: 0.5 MG/DL (ref 0.2–1)
CALCIUM SERPL-MCNC: 8.2 MG/DL (ref 8.5–10.1)
CO2 SERPL-SCNC: 25 MMOL/L (ref 21–32)
CREAT SERPL-MCNC: 0.2 MG/DL (ref 0.7–1.3)
DEPRECATED RDW RBC AUTO: 17.4 % (ref 11.9–15.9)
EOSINOPHIL # BLD: 1 % (ref 0–4.5)
GLUCOSE SERPL-MCNC: 86 MG/DL (ref 74–106)
MAGNESIUM SERPL-MCNC: 2.5 MG/DL (ref 1.8–2.4)
MCH RBC QN AUTO: 23.8 PG (ref 25.7–33.7)
MCHC RBC AUTO-ENTMCNC: 32.6 G/DL (ref 32–35.9)
MCV RBC: 73.2 FL (ref 80–96)
NEUTROPHILS # BLD: 60.4 % (ref 42.8–82.8)
PLATELET # BLD AUTO: 336 K/MM3 (ref 134–434)
PMV BLD: 6.3 FL (ref 7.5–11.1)
PROT SERPL-MCNC: 7.2 G/DL (ref 6.4–8.2)
WBC # BLD AUTO: 8.1 K/MM3 (ref 4–10)

## 2017-06-17 RX ADMIN — Medication SCH MG: at 09:46

## 2017-06-17 RX ADMIN — BACLOFEN SCH MG: 10 TABLET ORAL at 21:48

## 2017-06-17 RX ADMIN — MULTIVITAMIN TABLET SCH TAB: TABLET at 09:46

## 2017-06-17 RX ADMIN — SENNOSIDES SCH TAB: 8.6 TABLET, FILM COATED ORAL at 21:48

## 2017-06-17 RX ADMIN — INSULIN ASPART SCH: 100 INJECTION, SOLUTION INTRAVENOUS; SUBCUTANEOUS at 13:49

## 2017-06-17 RX ADMIN — INSULIN ASPART SCH: 100 INJECTION, SOLUTION INTRAVENOUS; SUBCUTANEOUS at 06:53

## 2017-06-17 RX ADMIN — INSULIN ASPART SCH: 100 INJECTION, SOLUTION INTRAVENOUS; SUBCUTANEOUS at 21:41

## 2017-06-17 RX ADMIN — Medication SCH MG: at 21:53

## 2017-06-17 RX ADMIN — BACLOFEN SCH MG: 10 TABLET ORAL at 09:46

## 2017-06-17 RX ADMIN — INSULIN ASPART SCH: 100 INJECTION, SOLUTION INTRAVENOUS; SUBCUTANEOUS at 17:19

## 2017-06-17 RX ADMIN — SODIUM CHLORIDE SCH: 9 INJECTION, SOLUTION INTRAVENOUS at 21:43

## 2017-06-17 RX ADMIN — Medication SCH ML: at 17:51

## 2017-06-17 RX ADMIN — Medication SCH ML: at 08:33

## 2017-06-17 RX ADMIN — HEPARIN SODIUM SCH UNIT: 5000 INJECTION, SOLUTION INTRAVENOUS; SUBCUTANEOUS at 09:46

## 2017-06-17 RX ADMIN — SODIUM HYPOCHLORITE SCH APPLIC: 2.5 SOLUTION TOPICAL at 21:40

## 2017-06-17 RX ADMIN — HEPARIN SODIUM SCH UNIT: 5000 INJECTION, SOLUTION INTRAVENOUS; SUBCUTANEOUS at 21:47

## 2017-06-17 RX ADMIN — VANCOMYCIN HYDROCHLORIDE SCH MLS/HR: 1 INJECTION, POWDER, LYOPHILIZED, FOR SOLUTION INTRAVENOUS at 14:33

## 2017-06-17 RX ADMIN — PIPERACILLIN SODIUM,TAZOBACTAM SODIUM SCH MLS/HR: 3; .375 INJECTION, POWDER, FOR SOLUTION INTRAVENOUS at 18:54

## 2017-06-17 RX ADMIN — PIPERACILLIN SODIUM,TAZOBACTAM SODIUM SCH MLS/HR: 3; .375 INJECTION, POWDER, FOR SOLUTION INTRAVENOUS at 13:52

## 2017-06-17 NOTE — PN
Progress Note (short form)





- Note


Progress Note: 


Patient known to me from previous admission in May 2017 regarding Grade IV 

Right Ischial decubitus


Patient readmitted for drainage , odor positive .


PH : Relevant for Osteomyelitis Right Ischial bone, under treatment IV 

antibiotic


Diabetes.





Recommend:


1 Neuro consult for control of involuntary spasms ( before any reconstructive 

procedure on Right Ischium is planned) Patient frequent spasms, this causes 

Shear/friction force on the skin, unless controlled, flap/reconstructive will 

be failure.





2. WOund care: Daikins Solution 1/4  % TID to Right Ischium





3 Control Blood sugar till optimized control.





4 Please re-consult when patient is ready for surgical procedure





5. Low Pressure mattress, also patient position is important, greater than 35 

degree l will cause more shear force on Ischium


Thanks





Dr Story 


total time seeing patient evaluating, measuring (aprox 4.8x1.3x6.2 extends to 

bone tunnel extend cepahlic direction 11 Oclock position)


30 minutes time spend

## 2017-06-17 NOTE — HP
Admitting History and Physical





- Primary Care Physician


PCP: Soumya Ramirez





- Admission


Chief Complaint: Infected Decubitus Ulcer


History of Present Illness: 


Patient is a 42 year old male with PMH of Right decubitus ulcer, quadriplegia, 

Diabetes Mellitus type 2 and recurrent UTIs , here 5/12/17 for sepsis due to 

decubitus ulcer (s/p debridement, picc, vanco/rocephin x 6w), who presents to 

the ED from [facility] due to signs of infection. Patient states that he has 

been feeling well, without malaise, fever, chills or pain, had been getting 

wound care and abx infusions. The attending doctor at [facility], noticed 

malodorous purulent discharge from ulcer. He denies any other associated 

symptoms, confusion, chest pain, sob, abdominal pain, diarrhea, dysuria.  


History Source: Patient, Medical Record, Transfer Record





- Past Medical History


CNS: Yes: Other (quadriplegia)


Renal/: Yes: UTI


Musculoskeletal: Yes: Other (osteoporosis)





- Smoking History


Smoking history: Never smoked


Have you smoked in the past 12 months: No





- Alcohol/Substance Use


Hx Alcohol Use: No





Home Medications





- Allergies


Allergies/Adverse Reactions: 


 Allergies











Allergy/AdvReac Type Severity Reaction Status Date / Time


 


levofloxacin Allergy Unknown  Verified 06/16/17 14:50














- Home Medications


Home Medications: 


Ambulatory Orders





Acetaminophen [Tylenol] 650 mg PO Q6H PRN 05/11/17 


Alendronate Na [Fosamax (Weekly)] 70 mg PO WEEKLY 05/11/17 


Ammonium Lactate Lotion [Lac-Hydrin 12] 1 applic TP ASDIR 05/11/17 


Calcium 250Mg/Vit-D 125 Units [Oscal 250 mg+D -] 1 combo PO DAILY 05/11/17 


Acetaminophen [Tylenol .Regular Strength -] 650 mg PO Q6H PRN #0 tablet 05/22/ 17 


Amino Acids/Protein Hydrolys [Prosource No Carb Liquid Pkt] 30 ml PO BID@0800,

1730  packet 05/22/17 


Baclofen [Lioresal -] 10 mg PO BID  tablet 05/22/17 


Metformin HCl [Glucophage -] 1,000 mg PO BID@0700,1630  tablet 05/22/17 


Multivitamins [Multivit ([facility] Formulary)] 1 tab PO DAILY  tab 05/22/17 


Picc Line Flush [Picc Line Flush -] 8 ml IVPUSH PRN PRN #0 ml 05/22/17 


Sennosides [Senna -] 1 tab PO BID  tablet 05/22/17 


Silver Sulfadiazine 1% Top Cr [Silvadene -] 1 applic TP BID  jar 05/22/17 


Zinc Sulfate [Zinc-220] 220 mg PO DAILY 06/16/17 











Family Disease History





- Family Disease History


Family History: Unable to Obtain





Review of Systems





- Review of Systems


Constitutional: reports: No Symptoms


Eyes: reports: No Symptoms


HENT: reports: No Symptoms


Neck: reports: No Symptoms


Cardiovascular: reports: No Symptoms


Respiratory: reports: No Symptoms


Gastrointestinal: reports: No Symptoms


Genitourinary: reports: No Symptoms


Breasts: reports: No Symptoms Reported


Musculoskeletal: reports: No Symptoms


Integumentary: reports: Wound


Neurological: reports: No Symptoms


Endocrine: reports: No Symptoms


Hematology/Lymphatic: reports: No Symptoms


Psychiatric: reports: No Symptoms





Physical Examination


Vital Signs: 


 Vital Signs











Temperature  99 F   06/17/17 09:00


 


Pulse Rate  78   06/17/17 09:00


 


Respiratory Rate  20   06/17/17 10:00


 


Blood Pressure  105/64   06/17/17 09:00


 


O2 Sat by Pulse Oximetry (%)  100   06/17/17 10:00











Constitutional: Yes: Well Nourished, No Distress, Calm


Cardiovascular: Yes: Regular Rate and Rhythm


Respiratory: Yes: Regular


Gastrointestinal: Yes: Normal Bowel Sounds


Musculoskeletal: Yes: Other (quadriplegic)


Extremities: Yes: WNL


Edema: No


Integumentary: Yes: Pressure Ulcer (right buttock)


Wound/Incision: Yes: Draining


Neurological: Yes: Alert


Labs: 


 CBC, BMP





 06/17/17 08:15 





 06/17/17 08:15 











Problem List





- Problems


(1) Decubitus ulcer of right ischial area


Assessment/Plan: 


RELIEVE PRESSURE OFF THE WOUND SITE, POSITIONING Q2H


Code(s): L89.319 - PRESSURE ULCER OF RIGHT BUTTOCK, UNSPECIFIED STAGE   

Qualifiers: 


   





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


Code(s): E10.9 - TYPE 1 DIABETES MELLITUS WITHOUT COMPLICATIONS





(3) Decubitus ulcer, stage 3 with infection


Assessment/Plan: 


ON IV ABX


ID CONSULT


PLASTIC SURGERY AND VASCULAR CONSULT FOR WOUND CARE.


Code(s): L89.93 - PRESSURE ULCER OF UNSPECIFIED SITE, STAGE 3


L08.9 - LOCAL INFECTION OF THE SKIN AND SUBCUTANEOUS TISSUE, UNSP








Assessment/Plan


ID,PLASTIC SX, VASCULAR CONSULT


IV ABX


MAY NEED DEBRIDEMENT AGAIN, WOUND VAC? ALGINATE.

## 2017-06-18 LAB
ALBUMIN SERPL-MCNC: 2.6 G/DL (ref 3.4–5)
ALP SERPL-CCNC: 64 U/L (ref 45–117)
ALT SERPL-CCNC: 8 U/L (ref 12–78)
ANION GAP SERPL CALC-SCNC: 8 MMOL/L (ref 8–16)
AST SERPL-CCNC: 12 U/L (ref 15–37)
BASOPHILS # BLD: 0.7 % (ref 0–2)
BILIRUB SERPL-MCNC: 0.4 MG/DL (ref 0.2–1)
CALCIUM SERPL-MCNC: 8.1 MG/DL (ref 8.5–10.1)
CO2 SERPL-SCNC: 28 MMOL/L (ref 21–32)
CREAT SERPL-MCNC: 0.2 MG/DL (ref 0.7–1.3)
CRP SERPL-MCNC: 7.6 MG/DL (ref 0–0.3)
DEPRECATED RDW RBC AUTO: 17.2 % (ref 11.9–15.9)
EOSINOPHIL # BLD: 1.3 % (ref 0–4.5)
GLUCOSE SERPL-MCNC: 83 MG/DL (ref 74–106)
MCH RBC QN AUTO: 23.6 PG (ref 25.7–33.7)
MCHC RBC AUTO-ENTMCNC: 32.1 G/DL (ref 32–35.9)
MCV RBC: 73.8 FL (ref 80–96)
NEUTROPHILS # BLD: 59.8 % (ref 42.8–82.8)
PLATELET # BLD AUTO: 384 K/MM3 (ref 134–434)
PMV BLD: 6.5 FL (ref 7.5–11.1)
PROT SERPL-MCNC: 7.2 G/DL (ref 6.4–8.2)
WBC # BLD AUTO: 7.2 K/MM3 (ref 4–10)

## 2017-06-18 RX ADMIN — INSULIN ASPART SCH: 100 INJECTION, SOLUTION INTRAVENOUS; SUBCUTANEOUS at 06:13

## 2017-06-18 RX ADMIN — HEPARIN SODIUM SCH UNIT: 5000 INJECTION, SOLUTION INTRAVENOUS; SUBCUTANEOUS at 22:22

## 2017-06-18 RX ADMIN — SODIUM CHLORIDE SCH MLS/HR: 9 INJECTION, SOLUTION INTRAVENOUS at 02:10

## 2017-06-18 RX ADMIN — INSULIN ASPART SCH: 100 INJECTION, SOLUTION INTRAVENOUS; SUBCUTANEOUS at 22:19

## 2017-06-18 RX ADMIN — PIPERACILLIN SODIUM,TAZOBACTAM SODIUM SCH MLS/HR: 3; .375 INJECTION, POWDER, FOR SOLUTION INTRAVENOUS at 02:10

## 2017-06-18 RX ADMIN — VANCOMYCIN HYDROCHLORIDE SCH MLS/HR: 1 INJECTION, POWDER, LYOPHILIZED, FOR SOLUTION INTRAVENOUS at 10:28

## 2017-06-18 RX ADMIN — Medication SCH: at 17:23

## 2017-06-18 RX ADMIN — MULTIVITAMIN TABLET SCH TAB: TABLET at 09:17

## 2017-06-18 RX ADMIN — ERTAPENEM SODIUM SCH MLS/HR: 1 INJECTION, POWDER, LYOPHILIZED, FOR SOLUTION INTRAMUSCULAR; INTRAVENOUS at 13:44

## 2017-06-18 RX ADMIN — METFORMIN HYDROCHLORIDE SCH: 500 TABLET ORAL at 17:23

## 2017-06-18 RX ADMIN — SODIUM CHLORIDE SCH: 9 INJECTION, SOLUTION INTRAVENOUS at 22:29

## 2017-06-18 RX ADMIN — SENNOSIDES SCH TAB: 8.6 TABLET, FILM COATED ORAL at 22:23

## 2017-06-18 RX ADMIN — Medication SCH ML: at 08:21

## 2017-06-18 RX ADMIN — SODIUM HYPOCHLORITE SCH APPLIC: 2.5 SOLUTION TOPICAL at 22:22

## 2017-06-18 RX ADMIN — INSULIN ASPART SCH: 100 INJECTION, SOLUTION INTRAVENOUS; SUBCUTANEOUS at 17:20

## 2017-06-18 RX ADMIN — Medication SCH ML: at 17:20

## 2017-06-18 RX ADMIN — SODIUM HYPOCHLORITE SCH APPLIC: 2.5 SOLUTION TOPICAL at 10:00

## 2017-06-18 RX ADMIN — PIPERACILLIN SODIUM,TAZOBACTAM SODIUM SCH MLS/HR: 3; .375 INJECTION, POWDER, FOR SOLUTION INTRAVENOUS at 09:17

## 2017-06-18 RX ADMIN — BACLOFEN SCH MG: 10 TABLET ORAL at 22:23

## 2017-06-18 RX ADMIN — Medication SCH MG: at 09:17

## 2017-06-18 RX ADMIN — SODIUM HYPOCHLORITE SCH APPLIC: 2.5 SOLUTION TOPICAL at 06:12

## 2017-06-18 RX ADMIN — METFORMIN HYDROCHLORIDE SCH MG: 500 TABLET ORAL at 17:19

## 2017-06-18 RX ADMIN — INSULIN ASPART SCH: 100 INJECTION, SOLUTION INTRAVENOUS; SUBCUTANEOUS at 12:39

## 2017-06-18 RX ADMIN — CLINDAMYCIN PHOSPHATE SCH MLS/HR: 300 INJECTION, SOLUTION INTRAVENOUS at 18:09

## 2017-06-18 RX ADMIN — HEPARIN SODIUM SCH UNIT: 5000 INJECTION, SOLUTION INTRAVENOUS; SUBCUTANEOUS at 09:17

## 2017-06-18 RX ADMIN — BACLOFEN SCH MG: 10 TABLET ORAL at 09:17

## 2017-06-18 NOTE — CONSULT
Consult


Consult Specialty:: infectious diseases


Reason for Consultation:: gluteal abscess and stage 4 decubitus ulcer





- History of Present Illness


History of Present Illness: 


42 year old male with PMH of Right decubitus ulcer, quadriplegia, Diabetes 

Mellitus type 2 and recurrent UTIs , here 5/12/17 for sepsis due to decubitus 

ulcer (s/p debridement, picc, vanco/rocephin x 6w),admitted from [facility] 

because of still drainage pus and infection from the decubitus ulcer


no other issues


patient evaluated by plastics and the plan noted


patient has no symptoms other wise doing well





- History Source


History Provided By: Patient


Limitations to Obtaining History: No Limitations





- Past Medical History


CNS: Yes: Other (quadriplegia)


Renal/: Yes: UTI


Musculoskeletal: Yes: Other (osteoporosis)





- Alcohol/Substance Use


Hx Alcohol Use: No





- Smoking History


Smoking history: Never smoked


Have you smoked in the past 12 months: No





Home Medications





- Allergies


Allergies/Adverse Reactions: 


 Allergies











Allergy/AdvReac Type Severity Reaction Status Date / Time


 


levofloxacin Allergy Unknown  Verified 06/16/17 14:50














- Home Medications


Home Medications: 


Ambulatory Orders





Acetaminophen [Tylenol] 650 mg PO Q6H PRN 05/11/17 


Alendronate Na [Fosamax (Weekly)] 70 mg PO WEEKLY 05/11/17 


Ammonium Lactate Lotion [Lac-Hydrin 12] 1 applic TP ASDIR 05/11/17 


Calcium 250Mg/Vit-D 125 Units [Oscal 250 mg+D -] 1 combo PO DAILY 05/11/17 


Acetaminophen [Tylenol .Regular Strength -] 650 mg PO Q6H PRN #0 tablet 05/22/ 17 


Amino Acids/Protein Hydrolys [Prosource No Carb Liquid Pkt] 30 ml PO BID@0800,

1730  packet 05/22/17 


Baclofen [Lioresal -] 10 mg PO BID  tablet 05/22/17 


Metformin HCl [Glucophage -] 1,000 mg PO BID@0700,1630  tablet 05/22/17 


Multivitamins [Multivit ([facility] Formulary)] 1 tab PO DAILY  tab 05/22/17 


Picc Line Flush [Picc Line Flush -] 8 ml IVPUSH PRN PRN #0 ml 05/22/17 


Sennosides [Senna -] 1 tab PO BID  tablet 05/22/17 


Silver Sulfadiazine 1% Top Cr [Silvadene -] 1 applic TP BID  jar 05/22/17 


Zinc Sulfate [Zinc-220] 220 mg PO DAILY 06/16/17 











Review of Systems





- Review of Systems


Constitutional: reports: No Symptoms


Eyes: reports: No Symptoms


HENT: reports: No Symptoms


Neck: reports: No Symptoms


Cardiovascular: reports: No Symptoms


Respiratory: reports: No Symptoms


Gastrointestinal: reports: No Symptoms


Genitourinary: reports: No Symptoms


Musculoskeletal: reports: No Symptoms


Integumentary: reports: Wound


Neurological: reports: Other


Endocrine: reports: No Symptoms


Hematology/Lymphatic: reports: No Symptoms





Physical Exam


Vital Signs: 


 Vital Signs











Temperature  97.9 F   06/18/17 06:00


 


Pulse Rate  99 H  06/18/17 06:00


 


Respiratory Rate  20   06/18/17 06:00


 


Blood Pressure  123/73   06/18/17 06:00


 


O2 Sat by Pulse Oximetry (%)  100   06/17/17 20:41











Constitutional: Yes: No Distress, Calm


Eyes: Yes: Conjunctiva Clear


Neck: Yes: Supple, Trachea Midline


Cardiovascular: Yes: Regular Rate and Rhythm


Respiratory: Yes: Regular


Gastrointestinal: Yes: Normal Bowel Sounds, Soft


Musculoskeletal: Yes: Other


Extremities: Yes: Other


Wound/Incision: Yes: Other (stage 4 sacral ulcer)


Neurological: Yes: Alert, Oriented


Psychiatric: Yes: Alert, Oriented


Labs: 


 CBC, BMP





 06/18/17 06:30 





 06/18/17 06:30 











Assessment/Plan


this patient who was being treated for  Osteomyelitis Right Ischial bone, with 

iv abx,started having hunter pus draining from the wound


patient is known to the plastic surgeon


note noted


wants the patient to be optimized before surgery





patient now is bacteremic and 





OM


bacteremia


wound infection


abscess





patient itchy after vanco





after looking at all the cx reports--will see what it is growing


patient bacteremic inspite of being on zosyn


will change abx





plan


will start patient on clinda


and change zosyn to ertapenam

## 2017-06-18 NOTE — PN
Progress Note, Physician


Chief Complaint: 


INFECTED PRESSURE ULCER STAGE 4


History of Present Illness: 


COMFORTABLE IN BED,NAD, SEEN BY PLASTIC SURGERY AND ID. AWAITING EVALUATION BY 

NEUROSURGERY FOR TREMORS. IS ALREADY ON BACLOFAN. NEEDS SURGICAL INTERVENTION 

FOR STAGE 4 PRESSURE ULCER. IF TREMORS UNCONTROLLED, MAY NEED TO BE TRANSFERRED 

TO TERTIARY CARE FACILITY.





- Current Medication List


Current Medications: 


Active Medications





Acetaminophen (Tylenol -)  650 mg PO Q4H PRN


   PRN Reason: FEVER OR PAIN


   Last Admin: 06/17/17 02:19 Dose:  650 mg


Amino Acids (Prosource No Carb Liquid Pkt)  30 ml PO BID@0800,1730 [facility]


   Last Admin: 06/18/17 17:20 Dose:  30 ml


Baclofen (Lioresal -)  10 mg PO BID [facility]


Calcium/Vitamin D (Oscal 250 Mg+D -)  1 tab PO DAILY [facility]


Heparin Sodium (Porcine) (Heparin -)  5,000 unit SQ BID [facility]


   Last Admin: 06/18/17 09:17 Dose:  5,000 unit


Sodium Chloride (Normal Saline -)  1,000 mls @ 75 mls/hr IV ASDIR [facility]


   Last Admin: 06/18/17 02:10 Dose:  75 mls/hr


Clindamycin Phosphate (Cleocin 300 Mg Premix Ivpb)  50 mls @ 104 mls/hr IVPB Q8H

-IV [facility]


Ertapenem 1 gm/ Sodium (Chloride)  50 mls @ 100 mls/hr IVPB DAILY [facility]


   PRN Reason: Protocol


   Last Admin: 06/18/17 13:44 Dose:  100 mls/hr


Insulin Aspart (Novolog Vial Sliding Scale -)  1 vial SQ ACHS [facility]


   PRN Reason: Protocol


   Last Admin: 06/18/17 17:20 Dose:  Not Given


Lactic Acid (Lac-Hydrin 12)  1 applic TP ASDIR [facility]


   Last Admin: 06/18/17 17:23 Dose:  Not Given


Metformin HCl (Glucophage -)  1,000 mg PO BID@0700,1630 [facility]


   Last Admin: 06/18/17 17:23 Dose:  Not Given


Multivitamins/Minerals/Vitamin C (Tab-A-Vit -)  1 tab PO DAILY [facility]


Non-Formulary Medication (Alendronate Na [Fosamax (Weekly)])  70 mg PO Th@06 [facility]


Oxycodone HCl (Roxicodone -)  5 mg PO Q6H PRN


   PRN Reason: PAIN


Senna (Senna -)  1 tab PO BID [facility]


Sodium Hypochlorite (Dakin's Solution 0.25% (Half-Strength) -)  1 applic TP TID 

[facility]


   Last Admin: 06/18/17 10:00 Dose:  1 applic


Zinc Sulfate (Orazinc -)  220 mg PO DAILY [facility]











- Objective


Vital Signs: 


 Vital Signs











Temperature  98.4 F   06/18/17 09:00


 


Pulse Rate  68   06/18/17 09:00


 


Respiratory Rate  20   06/18/17 09:00


 


Blood Pressure  130/68   06/18/17 09:00


 


O2 Sat by Pulse Oximetry (%)  100   06/18/17 09:00











Constitutional: Yes: Well Nourished, No Distress, Calm


Cardiovascular: Yes: Regular Rate and Rhythm


Respiratory: Yes: Regular


Musculoskeletal: Yes: WNL


Extremities: Yes: WNL


Edema: No


Peripheral Pulses WNL: Yes


Wound/Incision: Yes: Draining


Labs: 


 CBC, BMP





 06/18/17 06:30 





 06/18/17 06:30 











Problem List





- Problems


(1) Decubitus ulcer of right ischial area


Assessment/Plan: 


RELIEVE PRESSURE OFF THE WOUND SITE, 


POSITIONING Q2H, 


IV ABX, 


ID CONSULT


NEEDS SURGICAL INTERVENTION


NEED TO CONTROL TREMORS TO REDUCE THE RISK OF BREAKDOWN AGAIN


Code(s): L89.319 - PRESSURE ULCER OF RIGHT BUTTOCK, UNSPECIFIED STAGE   

Qualifiers: 


   





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


Code(s): E10.9 - TYPE 1 DIABETES MELLITUS WITHOUT COMPLICATIONS





(3) Decubitus ulcer, stage 4 with infection


Code(s): L89.94 - PRESSURE ULCER OF UNSPECIFIED SITE, STAGE 4


L08.9 - LOCAL INFECTION OF THE SKIN AND SUBCUTANEOUS TISSUE, UNSP








Assessment/Plan





RELIEVE PRESSURE OFF THE WOUND SITE, 


POSITIONING Q2H, 


IV ABX, 


ID CONSULT


NEEDS SURGICAL INTERVENTION


NEED TO CONTROL TREMORS TO REDUCE THE RISK OF BREAKDOWN AGAIN

## 2017-06-19 RX ADMIN — Medication SCH ML: at 17:16

## 2017-06-19 RX ADMIN — BACLOFEN SCH MG: 10 TABLET ORAL at 22:15

## 2017-06-19 RX ADMIN — SENNOSIDES SCH TAB: 8.6 TABLET, FILM COATED ORAL at 12:02

## 2017-06-19 RX ADMIN — SODIUM HYPOCHLORITE SCH APPLIC: 2.5 SOLUTION TOPICAL at 13:58

## 2017-06-19 RX ADMIN — HEPARIN SODIUM SCH UNIT: 5000 INJECTION, SOLUTION INTRAVENOUS; SUBCUTANEOUS at 22:15

## 2017-06-19 RX ADMIN — CALCIUM CARBONATE-CHOLECALCIFEROL TAB 250 MG-125 UNIT SCH TAB: 250-125 TAB at 10:09

## 2017-06-19 RX ADMIN — SODIUM HYPOCHLORITE SCH APPLIC: 2.5 SOLUTION TOPICAL at 22:16

## 2017-06-19 RX ADMIN — CLINDAMYCIN PHOSPHATE SCH MLS/HR: 300 INJECTION, SOLUTION INTRAVENOUS at 02:09

## 2017-06-19 RX ADMIN — Medication SCH ML: at 08:33

## 2017-06-19 RX ADMIN — SODIUM HYPOCHLORITE SCH APPLIC: 2.5 SOLUTION TOPICAL at 06:14

## 2017-06-19 RX ADMIN — INSULIN ASPART SCH: 100 INJECTION, SOLUTION INTRAVENOUS; SUBCUTANEOUS at 22:17

## 2017-06-19 RX ADMIN — INSULIN ASPART SCH: 100 INJECTION, SOLUTION INTRAVENOUS; SUBCUTANEOUS at 17:16

## 2017-06-19 RX ADMIN — Medication SCH MG: at 10:09

## 2017-06-19 RX ADMIN — CLINDAMYCIN PHOSPHATE SCH MLS/HR: 300 INJECTION, SOLUTION INTRAVENOUS at 12:01

## 2017-06-19 RX ADMIN — METFORMIN HYDROCHLORIDE SCH: 500 TABLET ORAL at 06:13

## 2017-06-19 RX ADMIN — ERTAPENEM SODIUM SCH MLS/HR: 1 INJECTION, POWDER, LYOPHILIZED, FOR SOLUTION INTRAMUSCULAR; INTRAVENOUS at 10:08

## 2017-06-19 RX ADMIN — MULTIVITAMIN TABLET SCH TAB: TABLET at 10:09

## 2017-06-19 RX ADMIN — SODIUM CHLORIDE SCH MLS/HR: 9 INJECTION, SOLUTION INTRAVENOUS at 18:53

## 2017-06-19 RX ADMIN — METFORMIN HYDROCHLORIDE SCH: 500 TABLET ORAL at 17:16

## 2017-06-19 RX ADMIN — INSULIN ASPART SCH: 100 INJECTION, SOLUTION INTRAVENOUS; SUBCUTANEOUS at 12:02

## 2017-06-19 RX ADMIN — Medication SCH APPFUL: at 17:16

## 2017-06-19 RX ADMIN — NYSTATIN SCH APPLIC: 100000 CREAM TOPICAL at 22:54

## 2017-06-19 RX ADMIN — SENNOSIDES SCH TAB: 8.6 TABLET, FILM COATED ORAL at 22:15

## 2017-06-19 RX ADMIN — BACLOFEN SCH MG: 10 TABLET ORAL at 10:08

## 2017-06-19 RX ADMIN — INSULIN ASPART SCH: 100 INJECTION, SOLUTION INTRAVENOUS; SUBCUTANEOUS at 06:13

## 2017-06-19 RX ADMIN — HEPARIN SODIUM SCH UNIT: 5000 INJECTION, SOLUTION INTRAVENOUS; SUBCUTANEOUS at 10:08
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
## 2017-06-19 NOTE — PN
Progress Note, Physician


Chief Complaint: 


IN BED, NAD


UPSET ABOUT HIS ULCER





- Current Medication List


Current Medications: 


Active Medications





Acetaminophen (Tylenol -)  650 mg PO Q4H PRN


   PRN Reason: FEVER OR PAIN


   Last Admin: 06/17/17 02:19 Dose:  650 mg


Amino Acids (Prosource No Carb Liquid Pkt)  30 ml PO BID@0800,1730 [facility]


   Last Admin: 06/19/17 17:16 Dose:  30 ml


Baclofen (Lioresal -)  10 mg PO BID [facility]


   Last Admin: 06/19/17 10:08 Dose:  10 mg


Calcium/Vitamin D (Oscal 250 Mg+D -)  1 tab PO DAILY [facility]


   Last Admin: 06/19/17 10:09 Dose:  1 tab


Heparin Sodium (Porcine) (Heparin -)  5,000 unit SQ BID [facility]


   Last Admin: 06/19/17 10:08 Dose:  5,000 unit


Sodium Chloride (Normal Saline -)  1,000 mls @ 75 mls/hr IV ASDIR [facility]


   Last Admin: 06/19/17 18:53 Dose:  75 mls/hr


Ertapenem 1 gm/ Sodium (Chloride)  50 mls @ 100 mls/hr IVPB DAILY [facility]


   PRN Reason: Protocol


   Last Admin: 06/19/17 10:08 Dose:  100 mls/hr


Insulin Aspart (Novolog Vial Sliding Scale -)  1 vial SQ ACHS [facility]


   PRN Reason: Protocol


   Last Admin: 06/19/17 17:16 Dose:  Not Given


Lactic Acid (Lac-Hydrin 12)  1 applic TP ASDIR [facility]


   Last Admin: 06/19/17 17:16 Dose:  1 appful


Metformin HCl (Glucophage -)  1,000 mg PO BID@0700,1630 [facility]


   Last Admin: 06/19/17 17:16 Dose:  Not Given


Multivitamins/Minerals/Vitamin C (Tab-A-Vit -)  1 tab PO DAILY [facility]


   Last Admin: 06/19/17 10:09 Dose:  1 tab


Non-Formulary Medication (Alendronate Na [Fosamax (Weekly)])  70 mg PO Th@06 [facility]


Oxycodone HCl (Roxicodone -)  5 mg PO Q6H PRN


   PRN Reason: PAIN


Senna (Senna -)  1 tab PO BID [facility]


   Last Admin: 06/19/17 12:02 Dose:  1 tab


Sodium Hypochlorite (Dakin's Solution 0.25% (Half-Strength) -)  1 applic TP TID 

[facility]


   Last Admin: 06/19/17 13:58 Dose:  1 applic


Zinc Sulfate (Orazinc -)  220 mg PO DAILY [facility]


   Last Admin: 06/19/17 10:09 Dose:  220 mg











- Objective


Vital Signs: 


 Vital Signs











Temperature  98.7 F   06/19/17 14:31


 


Pulse Rate  85   06/19/17 14:31


 


Respiratory Rate  18   06/19/17 14:31


 


Blood Pressure  127/73   06/19/17 14:31


 


O2 Sat by Pulse Oximetry (%)  100   06/19/17 09:00











Constitutional: Yes: Mild Distress


Eyes: Yes: WNL


HENT: Yes: WNL


Neck: Yes: WNL


Cardiovascular: Yes: WNL


Respiratory: Yes: Cheyne-Monk


Gastrointestinal: Yes: WNL


Genitourinary: Yes: Incontinence


Musculoskeletal: Yes: Muscle Weakness


Extremities: Yes: Deformity


Edema: No


Integumentary: Yes: Pressure Ulcer


Wound/Incision: Yes: Dressing Dry and Intact


Neurological: Yes: Loss of Sensation, Paresthesia, Pre-Existing Deficit, 

Weakness


...Motor Strength: LLE, RLE


Psychiatric: Yes: Other


Labs: 


 CBC, BMP





 06/18/17 06:30 





 06/18/17 06:30 











Problem List





- Problems


(1) Decubitus ulcer of right ischial area


Code(s): L89.319 - PRESSURE ULCER OF RIGHT BUTTOCK, UNSPECIFIED STAGE   

Qualifiers: 


   





(2) Decubitus ulcer, stage 3 with infection


Code(s): L89.93 - PRESSURE ULCER OF UNSPECIFIED SITE, STAGE 3


L08.9 - LOCAL INFECTION OF THE SKIN AND SUBCUTANEOUS TISSUE, UNSP





(3) Decubitus ulcer, stage 4 with infection


Code(s): L89.94 - PRESSURE ULCER OF UNSPECIFIED SITE, STAGE 4


L08.9 - LOCAL INFECTION OF THE SKIN AND SUBCUTANEOUS TISSUE, UNSP





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


Code(s): E10.9 - TYPE 1 DIABETES MELLITUS WITHOUT COMPLICATIONS





(5) Diabetes


Code(s): E11.9 - TYPE 2 DIABETES MELLITUS WITHOUT COMPLICATIONS   Qualifiers: 


     Diabetes mellitus type: type 2     Diabetes mellitus complication status: 

with unspecified complications





(6) Hypertension


Code(s): I10 - ESSENTIAL (PRIMARY) HYPERTENSION   





(7) Paraplegia following spinal cord injury


Code(s): G82.20 - PARAPLEGIA, UNSPECIFIED








Assessment/Plan


IV ABX


ID F/U


NEURO EVAL


NEUROSURGERY CONSULT REVIEWED


SURGERY EVAL FOR DEBRIDEMENT

## 2017-06-19 NOTE — PN
Progress Note (short form)





- Note


Progress Note: 


Vascular Surgery 





Came to see pt. Plastics on case. 


they will manage case once pt is cleared for surgery 





Wm Milian DO
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
## 2017-06-19 NOTE — PN
Progress Note, Physician


History of Present Illness: 


patient doing well


no new issues


drainage minimal





- Current Medication List


Current Medications: 


Active Medications





Acetaminophen (Tylenol -)  650 mg PO Q4H PRN


   PRN Reason: FEVER OR PAIN


   Last Admin: 06/17/17 02:19 Dose:  650 mg


Amino Acids (Prosource No Carb Liquid Pkt)  30 ml PO BID@0800,1730 [facility]


   Last Admin: 06/19/17 08:33 Dose:  30 ml


Baclofen (Lioresal -)  10 mg PO BID [facility]


   Last Admin: 06/19/17 10:08 Dose:  10 mg


Calcium/Vitamin D (Oscal 250 Mg+D -)  1 tab PO DAILY [facility]


   Last Admin: 06/19/17 10:09 Dose:  1 tab


Heparin Sodium (Porcine) (Heparin -)  5,000 unit SQ BID [facility]


   Last Admin: 06/19/17 10:08 Dose:  5,000 unit


Sodium Chloride (Normal Saline -)  1,000 mls @ 75 mls/hr IV ASDIR [facility]


   Last Admin: 06/18/17 22:29 Dose:  Not Given


Ertapenem 1 gm/ Sodium (Chloride)  50 mls @ 100 mls/hr IVPB DAILY [facility]


   PRN Reason: Protocol


   Last Admin: 06/19/17 10:08 Dose:  100 mls/hr


Insulin Aspart (Novolog Vial Sliding Scale -)  1 vial SQ ACHS [facility]


   PRN Reason: Protocol


   Last Admin: 06/19/17 12:02 Dose:  Not Given


Lactic Acid (Lac-Hydrin 12)  1 applic TP ASDIR [facility]


   Last Admin: 06/18/17 17:23 Dose:  Not Given


Metformin HCl (Glucophage -)  1,000 mg PO BID@0700,1630 [facility]


   Last Admin: 06/19/17 06:13 Dose:  Not Given


Multivitamins/Minerals/Vitamin C (Tab-A-Vit -)  1 tab PO DAILY [facility]


   Last Admin: 06/19/17 10:09 Dose:  1 tab


Non-Formulary Medication (Alendronate Na [Fosamax (Weekly)])  70 mg PO Th@06 [facility]


Oxycodone HCl (Roxicodone -)  5 mg PO Q6H PRN


   PRN Reason: PAIN


Senna (Senna -)  1 tab PO BID [facility]


   Last Admin: 06/19/17 12:02 Dose:  1 tab


Sodium Hypochlorite (Dakin's Solution 0.25% (Half-Strength) -)  1 applic TP TID 

[facility]


   Last Admin: 06/19/17 13:58 Dose:  1 applic


Zinc Sulfate (Orazinc -)  220 mg PO DAILY [facility]


   Last Admin: 06/19/17 10:09 Dose:  220 mg











- Objective


Vital Signs: 


 Vital Signs











Temperature  98.7 F   06/19/17 14:31


 


Pulse Rate  85   06/19/17 14:31


 


Respiratory Rate  18   06/19/17 14:31


 


Blood Pressure  127/73   06/19/17 14:31


 


O2 Sat by Pulse Oximetry (%)  100   06/19/17 09:00











Constitutional: Yes: No Distress, Calm


Cardiovascular: Yes: Regular Rate and Rhythm


Respiratory: Yes: Regular, CTA Bilaterally


Gastrointestinal: Yes: Normal Bowel Sounds, Soft


Musculoskeletal: Yes: Other


Extremities: Yes: Other (paraplegic)


Wound/Incision: Yes: Draining, Other


Neurological: Yes: Alert, Oriented


Psychiatric: Yes: Alert, Oriented


Labs: 


 CBC, BMP





 06/18/17 06:30 





 06/18/17 06:30 











Assessment/Plan


cx results noted





OM


bacteremia


wound infection


abscess











plan


will stop clinda


continue ertapenam


final surgery recommendations


wound care

## 2017-06-20 RX ADMIN — INSULIN ASPART SCH: 100 INJECTION, SOLUTION INTRAVENOUS; SUBCUTANEOUS at 16:31

## 2017-06-20 RX ADMIN — CALCIUM CARBONATE-CHOLECALCIFEROL TAB 250 MG-125 UNIT SCH TAB: 250-125 TAB at 09:29

## 2017-06-20 RX ADMIN — SODIUM CHLORIDE SCH MLS/HR: 9 INJECTION, SOLUTION INTRAVENOUS at 08:45

## 2017-06-20 RX ADMIN — Medication SCH MG: at 09:30

## 2017-06-20 RX ADMIN — SODIUM HYPOCHLORITE SCH APPLIC: 2.5 SOLUTION TOPICAL at 22:40

## 2017-06-20 RX ADMIN — BACLOFEN SCH MG: 10 TABLET ORAL at 09:30

## 2017-06-20 RX ADMIN — SODIUM CHLORIDE SCH MLS/HR: 9 INJECTION, SOLUTION INTRAVENOUS at 22:37

## 2017-06-20 RX ADMIN — INSULIN ASPART SCH: 100 INJECTION, SOLUTION INTRAVENOUS; SUBCUTANEOUS at 11:54

## 2017-06-20 RX ADMIN — Medication SCH APPFUL: at 16:31

## 2017-06-20 RX ADMIN — HEPARIN SODIUM SCH UNIT: 5000 INJECTION, SOLUTION INTRAVENOUS; SUBCUTANEOUS at 09:30

## 2017-06-20 RX ADMIN — MULTIVITAMIN TABLET SCH TAB: TABLET at 09:30

## 2017-06-20 RX ADMIN — BACLOFEN SCH MG: 10 TABLET ORAL at 22:38

## 2017-06-20 RX ADMIN — METFORMIN HYDROCHLORIDE SCH: 500 TABLET ORAL at 16:31

## 2017-06-20 RX ADMIN — SENNOSIDES SCH TAB: 8.6 TABLET, FILM COATED ORAL at 09:30

## 2017-06-20 RX ADMIN — NYSTATIN SCH APPLIC: 100000 CREAM TOPICAL at 10:00

## 2017-06-20 RX ADMIN — HEPARIN SODIUM SCH UNIT: 5000 INJECTION, SOLUTION INTRAVENOUS; SUBCUTANEOUS at 22:38

## 2017-06-20 RX ADMIN — INSULIN ASPART SCH: 100 INJECTION, SOLUTION INTRAVENOUS; SUBCUTANEOUS at 06:59

## 2017-06-20 RX ADMIN — Medication SCH ML: at 08:36

## 2017-06-20 RX ADMIN — METFORMIN HYDROCHLORIDE SCH: 500 TABLET ORAL at 07:35

## 2017-06-20 RX ADMIN — SENNOSIDES SCH TAB: 8.6 TABLET, FILM COATED ORAL at 22:39

## 2017-06-20 RX ADMIN — SODIUM HYPOCHLORITE SCH APPLIC: 2.5 SOLUTION TOPICAL at 06:58

## 2017-06-20 RX ADMIN — INSULIN ASPART SCH: 100 INJECTION, SOLUTION INTRAVENOUS; SUBCUTANEOUS at 22:38

## 2017-06-20 RX ADMIN — Medication SCH ML: at 16:31

## 2017-06-20 RX ADMIN — ERTAPENEM SODIUM SCH MLS/HR: 1 INJECTION, POWDER, LYOPHILIZED, FOR SOLUTION INTRAMUSCULAR; INTRAVENOUS at 11:50

## 2017-06-20 RX ADMIN — NYSTATIN SCH APPLIC: 100000 CREAM TOPICAL at 22:40

## 2017-06-20 RX ADMIN — SODIUM HYPOCHLORITE SCH APPLIC: 2.5 SOLUTION TOPICAL at 14:00
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
## 2017-06-20 NOTE — PN
Progress Note, Physician


History of Present Illness: 


stable


no new issues





- Current Medication List


Current Medications: 


Active Medications





Acetaminophen (Tylenol -)  650 mg PO Q4H PRN


   PRN Reason: FEVER OR PAIN


   Last Admin: 06/17/17 02:19 Dose:  650 mg


Amino Acids (Prosource No Carb Liquid Pkt)  30 ml PO BID@0800,1730 [facility]


   Last Admin: 06/20/17 08:36 Dose:  30 ml


Baclofen (Lioresal -)  10 mg PO BID [facility]


   Last Admin: 06/20/17 09:30 Dose:  10 mg


Calcium/Vitamin D (Oscal 250 Mg+D -)  1 tab PO DAILY [facility]


   Last Admin: 06/20/17 09:29 Dose:  1 tab


Heparin Sodium (Porcine) (Heparin -)  5,000 unit SQ BID [facility]


   Last Admin: 06/20/17 09:30 Dose:  5,000 unit


Sodium Chloride (Normal Saline -)  1,000 mls @ 75 mls/hr IV ASDIR [facility]


   Last Admin: 06/20/17 08:45 Dose:  75 mls/hr


Ertapenem 1 gm/ Sodium (Chloride)  50 mls @ 100 mls/hr IVPB DAILY [facility]


   PRN Reason: Protocol


   Last Admin: 06/20/17 11:50 Dose:  100 mls/hr


Insulin Aspart (Novolog Vial Sliding Scale -)  1 vial SQ ACHS [facility]


   PRN Reason: Protocol


   Last Admin: 06/20/17 11:54 Dose:  Not Given


Lactic Acid (Lac-Hydrin 12)  1 applic TP ASDIR [facility]


   Last Admin: 06/19/17 17:16 Dose:  1 appful


Metformin HCl (Glucophage -)  1,000 mg PO BID@0700,1630 [facility]


   Last Admin: 06/20/17 07:35 Dose:  Not Given


Multivitamins/Minerals/Vitamin C (Tab-A-Vit -)  1 tab PO DAILY [facility]


   Last Admin: 06/20/17 09:30 Dose:  1 tab


Nystatin/Triamcinolone Acetonide (Mycolog Ii Cream -)  1 applic TP BID [facility]


   Last Admin: 06/20/17 10:00 Dose:  1 applic


Oxycodone HCl (Roxicodone -)  5 mg PO Q6H PRN


   PRN Reason: PAIN


Senna (Senna -)  1 tab PO BID [facility]


   Last Admin: 06/20/17 09:30 Dose:  1 tab


Sodium Hypochlorite (Dakin's Solution 0.25% (Half-Strength) -)  1 applic TP TID 

[facility]


   Last Admin: 06/20/17 14:00 Dose:  1 applic


Zinc Sulfate (Orazinc -)  220 mg PO DAILY [facility]


   Last Admin: 06/20/17 09:30 Dose:  220 mg











- Objective


Vital Signs: 


 Vital Signs











Temperature  98.4 F   06/20/17 15:06


 


Pulse Rate  84   06/20/17 15:06


 


Respiratory Rate  16   06/20/17 15:06


 


Blood Pressure  116/70   06/20/17 15:06


 


O2 Sat by Pulse Oximetry (%)  99   06/20/17 09:00











Constitutional: Yes: No Distress, Calm


Cardiovascular: Yes: Regular Rate and Rhythm


Respiratory: Yes: Regular, CTA Bilaterally


Gastrointestinal: Yes: Normal Bowel Sounds, Soft


Musculoskeletal: Yes: WNL


Extremities: Yes: WNL


Wound/Incision: Yes: Other (stage 4 decubitus ulcer)


Neurological: Yes: Alert, Oriented


Psychiatric: Yes: Alert, Oriented


Labs: 


 CBC, BMP





 06/18/17 06:30 





 06/18/17 06:30 











Assessment/Plan


cx results noted


Problem List





- Problems


(1) Decubitus ulcer of right ischial area


Code(s): L89.319 - PRESSURE ULCER OF RIGHT BUTTOCK, UNSPECIFIED STAGE   

Qualifiers: 


   





(2) Decubitus ulcer, stage 3 with infection


Code(s): L89.93 - PRESSURE ULCER OF UNSPECIFIED SITE, STAGE 3


L08.9 - LOCAL INFECTION OF THE SKIN AND SUBCUTANEOUS TISSUE, UNSP





(3) Decubitus ulcer, stage 4 with infection


Code(s): L89.94 - PRESSURE ULCER OF UNSPECIFIED SITE, STAGE 4


L08.9 - LOCAL INFECTION OF THE SKIN AND SUBCUTANEOUS TISSUE, UNSP





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


Code(s): E10.9 - TYPE 1 DIABETES MELLITUS WITHOUT COMPLICATIONS





(5) Diabetes


Code(s): E11.9 - TYPE 2 DIABETES MELLITUS WITHOUT COMPLICATIONS   Qualifiers: 


     Diabetes mellitus type: type 2     Diabetes mellitus complication status: 

with unspecified complications





(6) Hypertension


Code(s): I10 - ESSENTIAL (PRIMARY) HYPERTENSION   





(7) Paraplegia following spinal cord injury


Code(s): G82.20 - PARAPLEGIA, UNSPECIFIED





plan


continue current abx


await for surgery to evaluate


rest ct as per primary


patient stable


wound care

## 2017-06-20 NOTE — PN
Progress Note, Physician


Chief Complaint: 


awake alert


nad


fever yesterday noted





- Current Medication List


Current Medications: 


Active Medications





Acetaminophen (Tylenol -)  650 mg PO Q4H PRN


   PRN Reason: FEVER OR PAIN


   Last Admin: 06/17/17 02:19 Dose:  650 mg


Amino Acids (Prosource No Carb Liquid Pkt)  30 ml PO BID@0800,1730 [facility]


   Last Admin: 06/20/17 08:36 Dose:  30 ml


Baclofen (Lioresal -)  10 mg PO BID [facility]


   Last Admin: 06/20/17 09:30 Dose:  10 mg


Calcium/Vitamin D (Oscal 250 Mg+D -)  1 tab PO DAILY [facility]


   Last Admin: 06/20/17 09:29 Dose:  1 tab


Heparin Sodium (Porcine) (Heparin -)  5,000 unit SQ BID [facility]


   Last Admin: 06/20/17 09:30 Dose:  5,000 unit


Sodium Chloride (Normal Saline -)  1,000 mls @ 75 mls/hr IV ASDIR [facility]


   Last Admin: 06/20/17 08:45 Dose:  75 mls/hr


Ertapenem 1 gm/ Sodium (Chloride)  50 mls @ 100 mls/hr IVPB DAILY [facility]


   PRN Reason: Protocol


   Last Admin: 06/20/17 11:50 Dose:  100 mls/hr


Insulin Aspart (Novolog Vial Sliding Scale -)  1 vial SQ ACHS [facility]


   PRN Reason: Protocol


   Last Admin: 06/20/17 11:54 Dose:  Not Given


Lactic Acid (Lac-Hydrin 12)  1 applic TP ASDIR [facility]


   Last Admin: 06/19/17 17:16 Dose:  1 appful


Metformin HCl (Glucophage -)  1,000 mg PO BID@0700,1630 [facility]


   Last Admin: 06/20/17 07:35 Dose:  Not Given


Multivitamins/Minerals/Vitamin C (Tab-A-Vit -)  1 tab PO DAILY [facility]


   Last Admin: 06/20/17 09:30 Dose:  1 tab


Nystatin/Triamcinolone Acetonide (Mycolog Ii Cream -)  1 applic TP BID [facility]


   Last Admin: 06/20/17 10:00 Dose:  1 applic


Oxycodone HCl (Roxicodone -)  5 mg PO Q6H PRN


   PRN Reason: PAIN


Senna (Senna -)  1 tab PO BID [facility]


   Last Admin: 06/20/17 09:30 Dose:  1 tab


Sodium Hypochlorite (Dakin's Solution 0.25% (Half-Strength) -)  1 applic TP TID 

[facility]


   Last Admin: 06/20/17 14:00 Dose:  1 applic


Zinc Sulfate (Orazinc -)  220 mg PO DAILY [facility]


   Last Admin: 06/20/17 09:30 Dose:  220 mg











- Objective


Vital Signs: 


 Vital Signs











Temperature  98.5 F   06/20/17 10:00


 


Pulse Rate  85   06/20/17 10:00


 


Respiratory Rate  18   06/20/17 10:00


 


Blood Pressure  109/67   06/20/17 10:00


 


O2 Sat by Pulse Oximetry (%)  99   06/20/17 09:00











Constitutional: Yes: No Distress


Eyes: Yes: WNL


HENT: Yes: WNL


Neck: Yes: WNL


Cardiovascular: Yes: WNL


Respiratory: Yes: WNL


Gastrointestinal: Yes: WNL


Genitourinary: Yes: Incontinence


Musculoskeletal: Yes: Muscle Weakness


Extremities: Yes: Deformity


Edema: No


Peripheral Pulses WNL: Yes


Integumentary: Yes: Pressure Ulcer


Wound/Incision: Yes: Dressing Dry and Intact, Unapproximated


Neurological: Yes: Pre-Existing Deficit


...Motor Strength: LUE, LLE, RUE, RLE


Psychiatric: Yes: WNL


Labs: 


 CBC, BMP





 06/18/17 06:30 





 06/18/17 06:30 











Problem List





- Problems


(1) Decubitus ulcer of right ischial area


Code(s): L89.319 - PRESSURE ULCER OF RIGHT BUTTOCK, UNSPECIFIED STAGE   

Qualifiers: 


   





(2) Decubitus ulcer, stage 3 with infection


Code(s): L89.93 - PRESSURE ULCER OF UNSPECIFIED SITE, STAGE 3


L08.9 - LOCAL INFECTION OF THE SKIN AND SUBCUTANEOUS TISSUE, UNSP





(3) Decubitus ulcer, stage 4 with infection


Code(s): L89.94 - PRESSURE ULCER OF UNSPECIFIED SITE, STAGE 4


L08.9 - LOCAL INFECTION OF THE SKIN AND SUBCUTANEOUS TISSUE, UNSP





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


Code(s): E10.9 - TYPE 1 DIABETES MELLITUS WITHOUT COMPLICATIONS





(5) Diabetes


Code(s): E11.9 - TYPE 2 DIABETES MELLITUS WITHOUT COMPLICATIONS   Qualifiers: 


     Diabetes mellitus type: type 2     Diabetes mellitus complication status: 

with unspecified complications





(6) Hypertension


Code(s): I10 - ESSENTIAL (PRIMARY) HYPERTENSION   





(7) Paraplegia following spinal cord injury


Code(s): G82.20 - PARAPLEGIA, UNSPECIFIED








Assessment/Plan


IV ABX


ID F/U


NEURO EVAL APPRECIATED


NEUROSURGERY CONSULT REVIEWED


SURGERY EVAL FOR DEBRIDEMENT

## 2017-06-21 LAB
ALBUMIN SERPL-MCNC: 2.7 G/DL (ref 3.4–5)
ALP SERPL-CCNC: 69 U/L (ref 45–117)
ALT SERPL-CCNC: 10 U/L (ref 12–78)
ANION GAP SERPL CALC-SCNC: 9 MMOL/L (ref 8–16)
AST SERPL-CCNC: 15 U/L (ref 15–37)
BILIRUB SERPL-MCNC: 0.3 MG/DL (ref 0.2–1)
CALCIUM SERPL-MCNC: 8.5 MG/DL (ref 8.5–10.1)
CO2 SERPL-SCNC: 27 MMOL/L (ref 21–32)
CREAT SERPL-MCNC: 0.2 MG/DL (ref 0.7–1.3)
DEPRECATED RDW RBC AUTO: 17.3 % (ref 11.9–15.9)
GLUCOSE SERPL-MCNC: 81 MG/DL (ref 74–106)
MCH RBC QN AUTO: 24 PG (ref 25.7–33.7)
MCHC RBC AUTO-ENTMCNC: 32.3 G/DL (ref 32–35.9)
MCV RBC: 74.2 FL (ref 80–96)
PLATELET # BLD AUTO: 382 K/MM3 (ref 134–434)
PMV BLD: 6.2 FL (ref 7.5–11.1)
PROT SERPL-MCNC: 7.4 G/DL (ref 6.4–8.2)
WBC # BLD AUTO: 9.5 K/MM3 (ref 4–10)

## 2017-06-21 RX ADMIN — BACLOFEN SCH MG: 10 TABLET ORAL at 21:54

## 2017-06-21 RX ADMIN — Medication SCH APPFUL: at 17:21

## 2017-06-21 RX ADMIN — SODIUM CHLORIDE SCH MLS/HR: 9 INJECTION, SOLUTION INTRAVENOUS at 12:24

## 2017-06-21 RX ADMIN — SODIUM HYPOCHLORITE SCH APPLIC: 2.5 SOLUTION TOPICAL at 21:53

## 2017-06-21 RX ADMIN — SENNOSIDES SCH TAB: 8.6 TABLET, FILM COATED ORAL at 21:54

## 2017-06-21 RX ADMIN — CALCIUM CARBONATE-CHOLECALCIFEROL TAB 250 MG-125 UNIT SCH TAB: 250-125 TAB at 11:02

## 2017-06-21 RX ADMIN — SODIUM HYPOCHLORITE SCH APPLIC: 2.5 SOLUTION TOPICAL at 17:21

## 2017-06-21 RX ADMIN — METFORMIN HYDROCHLORIDE SCH: 500 TABLET ORAL at 06:03

## 2017-06-21 RX ADMIN — BACLOFEN SCH MG: 10 TABLET ORAL at 11:03

## 2017-06-21 RX ADMIN — NYSTATIN SCH APPLIC: 100000 CREAM TOPICAL at 11:02

## 2017-06-21 RX ADMIN — HEPARIN SODIUM SCH UNIT: 5000 INJECTION, SOLUTION INTRAVENOUS; SUBCUTANEOUS at 21:54

## 2017-06-21 RX ADMIN — Medication SCH ML: at 09:01

## 2017-06-21 RX ADMIN — INSULIN ASPART SCH: 100 INJECTION, SOLUTION INTRAVENOUS; SUBCUTANEOUS at 06:03

## 2017-06-21 RX ADMIN — HEPARIN SODIUM SCH UNIT: 5000 INJECTION, SOLUTION INTRAVENOUS; SUBCUTANEOUS at 11:02

## 2017-06-21 RX ADMIN — SODIUM CHLORIDE SCH: 9 INJECTION, SOLUTION INTRAVENOUS at 21:53

## 2017-06-21 RX ADMIN — INSULIN ASPART SCH: 100 INJECTION, SOLUTION INTRAVENOUS; SUBCUTANEOUS at 21:55

## 2017-06-21 RX ADMIN — INSULIN ASPART SCH: 100 INJECTION, SOLUTION INTRAVENOUS; SUBCUTANEOUS at 17:48

## 2017-06-21 RX ADMIN — SODIUM HYPOCHLORITE SCH APPLIC: 2.5 SOLUTION TOPICAL at 06:03

## 2017-06-21 RX ADMIN — Medication SCH MG: at 11:03

## 2017-06-21 RX ADMIN — SENNOSIDES SCH TAB: 8.6 TABLET, FILM COATED ORAL at 11:03

## 2017-06-21 RX ADMIN — METFORMIN HYDROCHLORIDE SCH: 500 TABLET ORAL at 17:48

## 2017-06-21 RX ADMIN — ERTAPENEM SODIUM SCH MLS/HR: 1 INJECTION, POWDER, LYOPHILIZED, FOR SOLUTION INTRAMUSCULAR; INTRAVENOUS at 11:02

## 2017-06-21 RX ADMIN — Medication SCH ML: at 17:21

## 2017-06-21 RX ADMIN — NYSTATIN SCH APPLIC: 100000 CREAM TOPICAL at 21:59

## 2017-06-21 RX ADMIN — INSULIN ASPART SCH: 100 INJECTION, SOLUTION INTRAVENOUS; SUBCUTANEOUS at 12:20

## 2017-06-21 RX ADMIN — MULTIVITAMIN TABLET SCH TAB: TABLET at 11:02

## 2017-06-21 NOTE — PN
Progress Note, Physician


Chief Complaint: 


AWAKE ALERT





- Current Medication List


Current Medications: 


Active Medications





Acetaminophen (Tylenol -)  650 mg PO Q4H PRN


   PRN Reason: FEVER OR PAIN


   Last Admin: 06/17/17 02:19 Dose:  650 mg


Amino Acids (Prosource No Carb Liquid Pkt)  30 ml PO BID@0800,1730 [facility]


   Last Admin: 06/21/17 09:01 Dose:  30 ml


Baclofen (Lioresal -)  10 mg PO BID [facility]


   Last Admin: 06/20/17 22:38 Dose:  10 mg


Calcium/Vitamin D (Oscal 250 Mg+D -)  1 tab PO DAILY [facility]


   Last Admin: 06/20/17 09:29 Dose:  1 tab


Heparin Sodium (Porcine) (Heparin -)  5,000 unit SQ BID [facility]


   Last Admin: 06/20/17 22:38 Dose:  5,000 unit


Sodium Chloride (Normal Saline -)  1,000 mls @ 75 mls/hr IV ASDIR [facility]


   Last Admin: 06/20/17 22:37 Dose:  75 mls/hr


Ertapenem 1 gm/ Sodium (Chloride)  50 mls @ 100 mls/hr IVPB DAILY [facility]


   PRN Reason: Protocol


   Last Admin: 06/20/17 11:50 Dose:  100 mls/hr


Insulin Aspart (Novolog Vial Sliding Scale -)  1 vial SQ ACHS [facility]


   PRN Reason: Protocol


   Last Admin: 06/21/17 06:03 Dose:  Not Given


Lactic Acid (Lac-Hydrin 12)  1 applic TP ASDIR [facility]


   Last Admin: 06/20/17 16:31 Dose:  1 appful


Metformin HCl (Glucophage -)  1,000 mg PO BID@0700,1630 [facility]


   Last Admin: 06/21/17 06:03 Dose:  Not Given


Multivitamins/Minerals/Vitamin C (Tab-A-Vit -)  1 tab PO DAILY [facility]


   Last Admin: 06/20/17 09:30 Dose:  1 tab


Nystatin/Triamcinolone Acetonide (Mycolog Ii Cream -)  1 applic TP BID [facility]


   Last Admin: 06/20/17 22:40 Dose:  1 applic


Oxycodone HCl (Roxicodone -)  5 mg PO Q6H PRN


   PRN Reason: PAIN


Senna (Senna -)  1 tab PO BID [facility]


   Last Admin: 06/20/17 22:39 Dose:  1 tab


Sodium Hypochlorite (Dakin's Solution 0.25% (Half-Strength) -)  1 applic TP TID 

[facility]


   Last Admin: 06/21/17 06:03 Dose:  1 applic


Zinc Sulfate (Orazinc -)  220 mg PO DAILY [facility]


   Last Admin: 06/20/17 09:30 Dose:  220 mg











- Objective


Vital Signs: 


 Vital Signs











Temperature  99 F   06/21/17 06:00


 


Pulse Rate  86   06/21/17 06:00


 


Respiratory Rate  18   06/21/17 06:00


 


Blood Pressure  138/79   06/21/17 06:00


 


O2 Sat by Pulse Oximetry (%)  99   06/20/17 21:00











Constitutional: Yes: No Distress


Eyes: Yes: WNL


HENT: Yes: WNL


Neck: Yes: WNL


Cardiovascular: Yes: WNL


Respiratory: Yes: WNL


Gastrointestinal: Yes: WNL


Genitourinary: Yes: Incontinence


Musculoskeletal: Yes: Muscle Weakness


Extremities: Yes: Other


Edema: No


Peripheral Pulses WNL: Yes


Integumentary: Yes: Pressure Ulcer


Wound/Incision: Yes: Clean/Dry


Neurological: Yes: Pre-Existing Deficit, Weakness, Other


...Motor Strength: LUE, LLE, RUE, RLE


Labs: 


 CBC, BMP





 06/21/17 06:30 





 06/21/17 06:30 











Problem List





- Problems


(1) Decubitus ulcer of right ischial area


Code(s): L89.319 - PRESSURE ULCER OF RIGHT BUTTOCK, UNSPECIFIED STAGE   

Qualifiers: 


   





(2) Decubitus ulcer, stage 3 with infection


Code(s): L89.93 - PRESSURE ULCER OF UNSPECIFIED SITE, STAGE 3


L08.9 - LOCAL INFECTION OF THE SKIN AND SUBCUTANEOUS TISSUE, UNSP





(3) Decubitus ulcer, stage 4 with infection


Code(s): L89.94 - PRESSURE ULCER OF UNSPECIFIED SITE, STAGE 4


L08.9 - LOCAL INFECTION OF THE SKIN AND SUBCUTANEOUS TISSUE, UNSP





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


Code(s): E10.9 - TYPE 1 DIABETES MELLITUS WITHOUT COMPLICATIONS





(5) Diabetes


Code(s): E11.9 - TYPE 2 DIABETES MELLITUS WITHOUT COMPLICATIONS   Qualifiers: 


     Diabetes mellitus type: type 2     Diabetes mellitus complication status: 

with unspecified complications





(6) Hypertension


Code(s): I10 - ESSENTIAL (PRIMARY) HYPERTENSION   





(7) Paraplegia following spinal cord injury


Code(s): G82.20 - PARAPLEGIA, UNSPECIFIED








Assessment/Plan


IV ABX


SURGERY EVAL FOR DEBRIDEMENT


WOUND VAC


NEURO AND NEURO SX EVAL APPRECIATED

## 2017-06-21 NOTE — PN
Progress Note, Physician


History of Present Illness: 


stable


no issues





- Current Medication List


Current Medications: 


Active Medications





Acetaminophen (Tylenol -)  650 mg PO Q4H PRN


   PRN Reason: FEVER OR PAIN


   Last Admin: 06/17/17 02:19 Dose:  650 mg


Amino Acids (Prosource No Carb Liquid Pkt)  30 ml PO BID@0800,1730 [facility]


   Last Admin: 06/21/17 09:01 Dose:  30 ml


Baclofen (Lioresal -)  10 mg PO BID [facility]


   Last Admin: 06/21/17 11:03 Dose:  10 mg


Calcium/Vitamin D (Oscal 250 Mg+D -)  1 tab PO DAILY [facility]


   Last Admin: 06/21/17 11:02 Dose:  1 tab


Heparin Sodium (Porcine) (Heparin -)  5,000 unit SQ BID [facility]


   Last Admin: 06/21/17 11:02 Dose:  5,000 unit


Sodium Chloride (Normal Saline -)  1,000 mls @ 75 mls/hr IV ASDIR [facility]


   Last Admin: 06/21/17 12:24 Dose:  75 mls/hr


Ertapenem 1 gm/ Sodium (Chloride)  50 mls @ 100 mls/hr IVPB DAILY [facility]


   PRN Reason: Protocol


   Last Admin: 06/21/17 11:02 Dose:  100 mls/hr


Insulin Aspart (Novolog Vial Sliding Scale -)  1 vial SQ ACHS SUN


   PRN Reason: Protocol


   Last Admin: 06/21/17 12:20 Dose:  Not Given


Lactic Acid (Lac-Hydrin 12)  1 applic TP ASDIR [facility]


   Last Admin: 06/20/17 16:31 Dose:  1 appful


Metformin HCl (Glucophage -)  1,000 mg PO BID@0700,1630 [facility]


   Last Admin: 06/21/17 06:03 Dose:  Not Given


Multivitamins/Minerals/Vitamin C (Tab-A-Vit -)  1 tab PO DAILY [facility]


   Last Admin: 06/21/17 11:02 Dose:  1 tab


Nystatin/Triamcinolone Acetonide (Mycolog Ii Cream -)  1 applic TP BID [facility]


   Last Admin: 06/21/17 11:02 Dose:  1 applic


Oxycodone HCl (Roxicodone -)  5 mg PO Q6H PRN


   PRN Reason: PAIN


Senna (Senna -)  1 tab PO BID [facility]


   Last Admin: 06/21/17 11:03 Dose:  1 tab


Sodium Hypochlorite (Dakin's Solution 0.25% (Half-Strength) -)  1 applic TP TID 

[facility]


   Last Admin: 06/21/17 06:03 Dose:  1 applic


Zinc Sulfate (Orazinc -)  220 mg PO DAILY [facility]


   Last Admin: 06/21/17 11:03 Dose:  220 mg











- Objective


Vital Signs: 


 Vital Signs











Temperature  97.7 F   06/21/17 09:00


 


Pulse Rate  86   06/21/17 09:00


 


Respiratory Rate  18   06/21/17 09:00


 


Blood Pressure  135/53   06/21/17 09:00


 


O2 Sat by Pulse Oximetry (%)  99   06/21/17 09:00











Constitutional: Yes: No Distress, Calm


Cardiovascular: Yes: Regular Rate and Rhythm


Respiratory: Yes: Regular, CTA Bilaterally


Gastrointestinal: Yes: Normal Bowel Sounds, Soft


Musculoskeletal: Yes: Other


Extremities: Yes: Other


Wound/Incision: Yes: Dressing Dry and Intact, Other (minimal draiange)


Neurological: Yes: Alert, Oriented


Psychiatric: Yes: Alert, Oriented


Labs: 


 CBC, BMP





 06/21/17 06:30 





 06/21/17 06:30 











Assessment/Plan


cx results noted


Problem List





- Problems


(1) Decubitus ulcer of right ischial area


Code(s): L89.319 - PRESSURE ULCER OF RIGHT BUTTOCK, UNSPECIFIED STAGE   

Qualifiers: 


   





(2) Decubitus ulcer, stage 3 with infection


Code(s): L89.93 - PRESSURE ULCER OF UNSPECIFIED SITE, STAGE 3


L08.9 - LOCAL INFECTION OF THE SKIN AND SUBCUTANEOUS TISSUE, UNSP





(3) Decubitus ulcer, stage 4 with infection


Code(s): L89.94 - PRESSURE ULCER OF UNSPECIFIED SITE, STAGE 4


L08.9 - LOCAL INFECTION OF THE SKIN AND SUBCUTANEOUS TISSUE, UNSP





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


Code(s): E10.9 - TYPE 1 DIABETES MELLITUS WITHOUT COMPLICATIONS





(5) Diabetes


Code(s): E11.9 - TYPE 2 DIABETES MELLITUS WITHOUT COMPLICATIONS   Qualifiers: 


     Diabetes mellitus type: type 2     Diabetes mellitus complication status: 

with unspecified complications





(6) Hypertension


Code(s): I10 - ESSENTIAL (PRIMARY) HYPERTENSION   





(7) Paraplegia following spinal cord injury


Code(s): G82.20 - PARAPLEGIA, UNSPECIFIED





plan


continue current abx


surgery planning to debride


and wound vac once patient is ready


rest as per primary

## 2017-06-22 RX ADMIN — SODIUM CHLORIDE SCH: 9 INJECTION, SOLUTION INTRAVENOUS at 21:31

## 2017-06-22 RX ADMIN — HEPARIN SODIUM SCH UNIT: 5000 INJECTION, SOLUTION INTRAVENOUS; SUBCUTANEOUS at 21:24

## 2017-06-22 RX ADMIN — Medication SCH APPFUL: at 18:20

## 2017-06-22 RX ADMIN — BACLOFEN SCH MG: 10 TABLET ORAL at 21:24

## 2017-06-22 RX ADMIN — SENNOSIDES SCH TAB: 8.6 TABLET, FILM COATED ORAL at 21:24

## 2017-06-22 RX ADMIN — SENNOSIDES SCH TAB: 8.6 TABLET, FILM COATED ORAL at 10:12

## 2017-06-22 RX ADMIN — INSULIN ASPART SCH: 100 INJECTION, SOLUTION INTRAVENOUS; SUBCUTANEOUS at 06:36

## 2017-06-22 RX ADMIN — HEPARIN SODIUM SCH UNIT: 5000 INJECTION, SOLUTION INTRAVENOUS; SUBCUTANEOUS at 10:12

## 2017-06-22 RX ADMIN — SODIUM HYPOCHLORITE SCH APPLIC: 2.5 SOLUTION TOPICAL at 18:20

## 2017-06-22 RX ADMIN — BACLOFEN SCH MG: 10 TABLET ORAL at 10:12

## 2017-06-22 RX ADMIN — INSULIN ASPART SCH: 100 INJECTION, SOLUTION INTRAVENOUS; SUBCUTANEOUS at 17:19

## 2017-06-22 RX ADMIN — SODIUM HYPOCHLORITE SCH APPLIC: 2.5 SOLUTION TOPICAL at 21:31

## 2017-06-22 RX ADMIN — NYSTATIN SCH APPLIC: 100000 CREAM TOPICAL at 21:30

## 2017-06-22 RX ADMIN — METFORMIN HYDROCHLORIDE SCH: 500 TABLET ORAL at 15:36

## 2017-06-22 RX ADMIN — SODIUM CHLORIDE SCH MLS/HR: 9 INJECTION, SOLUTION INTRAVENOUS at 13:23

## 2017-06-22 RX ADMIN — METFORMIN HYDROCHLORIDE SCH: 500 TABLET ORAL at 06:36

## 2017-06-22 RX ADMIN — NYSTATIN SCH APPLIC: 100000 CREAM TOPICAL at 10:13

## 2017-06-22 RX ADMIN — CALCIUM CARBONATE-CHOLECALCIFEROL TAB 250 MG-125 UNIT SCH TAB: 250-125 TAB at 10:12

## 2017-06-22 RX ADMIN — SODIUM HYPOCHLORITE SCH APPLIC: 2.5 SOLUTION TOPICAL at 06:37

## 2017-06-22 RX ADMIN — MULTIVITAMIN TABLET SCH TAB: TABLET at 10:12

## 2017-06-22 RX ADMIN — INSULIN ASPART SCH: 100 INJECTION, SOLUTION INTRAVENOUS; SUBCUTANEOUS at 21:30

## 2017-06-22 RX ADMIN — Medication SCH MG: at 10:12

## 2017-06-22 RX ADMIN — INSULIN ASPART SCH: 100 INJECTION, SOLUTION INTRAVENOUS; SUBCUTANEOUS at 12:25

## 2017-06-22 RX ADMIN — ERTAPENEM SODIUM SCH MLS/HR: 1 INJECTION, POWDER, LYOPHILIZED, FOR SOLUTION INTRAMUSCULAR; INTRAVENOUS at 10:11

## 2017-06-22 RX ADMIN — Medication SCH ML: at 08:20

## 2017-06-22 RX ADMIN — Medication SCH ML: at 17:47

## 2017-06-22 NOTE — PN
Progress Note, Physician


History of Present Illness: 


stable


no issues





- Current Medication List


Current Medications: 


Active Medications





Acetaminophen (Tylenol -)  650 mg PO Q4H PRN


   PRN Reason: FEVER OR PAIN


   Last Admin: 06/17/17 02:19 Dose:  650 mg


Amino Acids (Prosource No Carb Liquid Pkt)  30 ml PO BID@0800,1730 [facility]


   Last Admin: 06/22/17 08:20 Dose:  30 ml


Baclofen (Lioresal -)  10 mg PO BID [facility]


   Last Admin: 06/22/17 10:12 Dose:  10 mg


Calcium/Vitamin D (Oscal 250 Mg+D -)  1 tab PO DAILY [facility]


   Last Admin: 06/22/17 10:12 Dose:  1 tab


Heparin Sodium (Porcine) (Heparin -)  5,000 unit SQ BID [facility]


   Last Admin: 06/22/17 10:12 Dose:  5,000 unit


Sodium Chloride (Normal Saline -)  1,000 mls @ 75 mls/hr IV ASDIR [facility]


   Last Admin: 06/22/17 13:23 Dose:  75 mls/hr


Ertapenem 1 gm/ Sodium (Chloride)  50 mls @ 100 mls/hr IVPB DAILY [facility]


   PRN Reason: Protocol


   Last Admin: 06/22/17 10:11 Dose:  100 mls/hr


Insulin Aspart (Novolog Vial Sliding Scale -)  1 vial SQ ACHS [facility]


   PRN Reason: Protocol


   Last Admin: 06/22/17 12:25 Dose:  Not Given


Lactic Acid (Lac-Hydrin 12)  1 applic TP ASDIR [facility]


   Last Admin: 06/21/17 17:21 Dose:  1 appful


Metformin HCl (Glucophage -)  1,000 mg PO BID@0700,1630 [facility]


   Last Admin: 06/22/17 06:36 Dose:  Not Given


Multivitamins/Minerals/Vitamin C (Tab-A-Vit -)  1 tab PO DAILY [facility]


   Last Admin: 06/22/17 10:12 Dose:  1 tab


Nystatin/Triamcinolone Acetonide (Mycolog Ii Cream -)  1 applic TP BID [facility]


   Last Admin: 06/22/17 10:13 Dose:  1 applic


Oxycodone HCl (Roxicodone -)  5 mg PO Q6H PRN


   PRN Reason: PAIN


Senna (Senna -)  1 tab PO BID [facility]


   Last Admin: 06/22/17 10:12 Dose:  1 tab


Sodium Hypochlorite (Dakin's Solution 0.25% (Half-Strength) -)  1 applic TP TID 

[facility]


   Last Admin: 06/22/17 06:37 Dose:  1 applic


Zinc Sulfate (Orazinc -)  220 mg PO DAILY [facility]


   Last Admin: 06/22/17 10:12 Dose:  220 mg











- Objective


Vital Signs: 


 Vital Signs











Temperature  98.0 F   06/22/17 08:48


 


Pulse Rate  76   06/22/17 08:48


 


Respiratory Rate  18   06/22/17 08:48


 


Blood Pressure  86/61   06/22/17 08:48


 


O2 Sat by Pulse Oximetry (%)  99   06/22/17 09:00











Constitutional: Yes: No Distress, Calm


Cardiovascular: Yes: Regular Rate and Rhythm


Respiratory: Yes: Regular, CTA Bilaterally


Gastrointestinal: Yes: Normal Bowel Sounds, Soft


Musculoskeletal: Yes: Other


Extremities: Yes: Other (paraplegia)


Wound/Incision: Yes: Other (infected stage 4 sacral decubitus)


Neurological: Yes: Alert, Oriented


Labs: 


 CBC, BMP





 06/21/17 06:30 





 06/21/17 06:30 











Assessment/Plan


cx results noted


Problem List





- Problems


(1) Decubitus ulcer of right ischial area


Code(s): L89.319 - PRESSURE ULCER OF RIGHT BUTTOCK, UNSPECIFIED STAGE   

Qualifiers: 


   





(2) Decubitus ulcer, stage 3 with infection


Code(s): L89.93 - PRESSURE ULCER OF UNSPECIFIED SITE, STAGE 3


L08.9 - LOCAL INFECTION OF THE SKIN AND SUBCUTANEOUS TISSUE, UNSP





(3) Decubitus ulcer, stage 4 with infection


Code(s): L89.94 - PRESSURE ULCER OF UNSPECIFIED SITE, STAGE 4


L08.9 - LOCAL INFECTION OF THE SKIN AND SUBCUTANEOUS TISSUE, UNSP





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


Code(s): E10.9 - TYPE 1 DIABETES MELLITUS WITHOUT COMPLICATIONS





(5) Diabetes


Code(s): E11.9 - TYPE 2 DIABETES MELLITUS WITHOUT COMPLICATIONS   Qualifiers: 


     Diabetes mellitus type: type 2     Diabetes mellitus complication status: 

with unspecified complications





(6) Hypertension


Code(s): I10 - ESSENTIAL (PRIMARY) HYPERTENSION   





(7) Paraplegia following spinal cord injury


Code(s): G82.20 - PARAPLEGIA, UNSPECIFIED





plan


continue current abx


surgery planning to debride


and wound vac once patient is ready


rest as per primary


all cx result noted

## 2017-06-22 NOTE — PN
Progress Note, Physician


Chief Complaint: 


AWAKE ALERT


NO CHANGES





- Current Medication List


Current Medications: 


Active Medications





Acetaminophen (Tylenol -)  650 mg PO Q4H PRN


   PRN Reason: FEVER OR PAIN


   Last Admin: 06/17/17 02:19 Dose:  650 mg


Amino Acids (Prosource No Carb Liquid Pkt)  30 ml PO BID@0800,1730 [facility]


   Last Admin: 06/22/17 08:20 Dose:  30 ml


Baclofen (Lioresal -)  10 mg PO BID [facility]


   Last Admin: 06/22/17 10:12 Dose:  10 mg


Calcium/Vitamin D (Oscal 250 Mg+D -)  1 tab PO DAILY [facility]


   Last Admin: 06/22/17 10:12 Dose:  1 tab


Heparin Sodium (Porcine) (Heparin -)  5,000 unit SQ BID [facility]


   Last Admin: 06/22/17 10:12 Dose:  5,000 unit


Sodium Chloride (Normal Saline -)  1,000 mls @ 75 mls/hr IV ASDIR [facility]


   Last Admin: 06/22/17 13:23 Dose:  75 mls/hr


Ertapenem 1 gm/ Sodium (Chloride)  50 mls @ 100 mls/hr IVPB DAILY [facility]


   PRN Reason: Protocol


   Last Admin: 06/22/17 10:11 Dose:  100 mls/hr


Insulin Aspart (Novolog Vial Sliding Scale -)  1 vial SQ ACHS [facility]


   PRN Reason: Protocol


   Last Admin: 06/22/17 12:25 Dose:  Not Given


Lactic Acid (Lac-Hydrin 12)  1 applic TP ASDIR [facility]


   Last Admin: 06/21/17 17:21 Dose:  1 appful


Metformin HCl (Glucophage -)  1,000 mg PO BID@0700,1630 [facility]


   Last Admin: 06/22/17 15:36 Dose:  Not Given


Multivitamins/Minerals/Vitamin C (Tab-A-Vit -)  1 tab PO DAILY [facility]


   Last Admin: 06/22/17 10:12 Dose:  1 tab


Nystatin/Triamcinolone Acetonide (Mycolog Ii Cream -)  1 applic TP BID [facility]


   Last Admin: 06/22/17 10:13 Dose:  1 applic


Senna (Senna -)  1 tab PO BID [facility]


   Last Admin: 06/22/17 10:12 Dose:  1 tab


Sodium Hypochlorite (Dakin's Solution 0.25% (Half-Strength) -)  1 applic TP TID 

[facility]


   Last Admin: 06/22/17 06:37 Dose:  1 applic


Zinc Sulfate (Orazinc -)  220 mg PO DAILY [facility]


   Last Admin: 06/22/17 10:12 Dose:  220 mg











- Objective


Vital Signs: 


 Vital Signs











Temperature  97.8 F   06/22/17 15:10


 


Pulse Rate  80   06/22/17 15:10


 


Respiratory Rate  18   06/22/17 08:48


 


Blood Pressure  110/85   06/22/17 15:10


 


O2 Sat by Pulse Oximetry (%)  99   06/22/17 09:00











Constitutional: Yes: No Distress


Eyes: Yes: WNL


HENT: Yes: WNL


Neck: Yes: WNL


Cardiovascular: Yes: WNL


Respiratory: Yes: WNL


Gastrointestinal: Yes: WNL


Genitourinary: Yes: Incontinence


Musculoskeletal: Yes: Muscle Weakness


Extremities: Yes: Deformity


Edema: No


Peripheral Pulses WNL: Yes


Integumentary: Yes: Pressure Ulcer, Venous Stasis Changes


Wound/Incision: Yes: Dressing Dry and Intact


Neurological: Yes: Pre-Existing Deficit, Other


...Motor Strength: LUE, LLE, RUE, RLE


Psychiatric: Yes: WNL


Labs: 


 CBC, BMP





 06/21/17 06:30 





 06/21/17 06:30 











Problem List





- Problems


(1) Decubitus ulcer of right ischial area


Code(s): L89.319 - PRESSURE ULCER OF RIGHT BUTTOCK, UNSPECIFIED STAGE   

Qualifiers: 


   





(2) Decubitus ulcer, stage 3 with infection


Code(s): L89.93 - PRESSURE ULCER OF UNSPECIFIED SITE, STAGE 3


L08.9 - LOCAL INFECTION OF THE SKIN AND SUBCUTANEOUS TISSUE, UNSP





(3) Decubitus ulcer, stage 4 with infection


Code(s): L89.94 - PRESSURE ULCER OF UNSPECIFIED SITE, STAGE 4


L08.9 - LOCAL INFECTION OF THE SKIN AND SUBCUTANEOUS TISSUE, UNSP





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


Code(s): E10.9 - TYPE 1 DIABETES MELLITUS WITHOUT COMPLICATIONS





(5) Diabetes


Code(s): E11.9 - TYPE 2 DIABETES MELLITUS WITHOUT COMPLICATIONS   Qualifiers: 


     Diabetes mellitus type: type 2     Diabetes mellitus complication status: 

with unspecified complications





(6) Hypertension


Code(s): I10 - ESSENTIAL (PRIMARY) HYPERTENSION   





(7) Paraplegia following spinal cord injury


Code(s): G82.20 - PARAPLEGIA, UNSPECIFIED








Assessment/Plan


NO NEUROLOGICAL INTERVENTIONS PLANNED


MEDICALLY CLEARED FOR SURGICAL DEBRIDEMENT


IV ABX


LABS REVIEWED

## 2017-06-23 VITALS — SYSTOLIC BLOOD PRESSURE: 100 MMHG | TEMPERATURE: 98.6 F | DIASTOLIC BLOOD PRESSURE: 67 MMHG | HEART RATE: 72 BPM

## 2017-06-23 RX ADMIN — Medication SCH MG: at 10:11

## 2017-06-23 RX ADMIN — ERTAPENEM SODIUM SCH MLS/HR: 1 INJECTION, POWDER, LYOPHILIZED, FOR SOLUTION INTRAMUSCULAR; INTRAVENOUS at 10:10

## 2017-06-23 RX ADMIN — SODIUM CHLORIDE SCH MLS/HR: 9 INJECTION, SOLUTION INTRAVENOUS at 03:35

## 2017-06-23 RX ADMIN — CALCIUM CARBONATE-CHOLECALCIFEROL TAB 250 MG-125 UNIT SCH TAB: 250-125 TAB at 10:11

## 2017-06-23 RX ADMIN — INSULIN ASPART SCH: 100 INJECTION, SOLUTION INTRAVENOUS; SUBCUTANEOUS at 12:25

## 2017-06-23 RX ADMIN — NYSTATIN SCH APPLIC: 100000 CREAM TOPICAL at 10:14

## 2017-06-23 RX ADMIN — MULTIVITAMIN TABLET SCH TAB: TABLET at 10:11

## 2017-06-23 RX ADMIN — HEPARIN SODIUM SCH UNIT: 5000 INJECTION, SOLUTION INTRAVENOUS; SUBCUTANEOUS at 10:10

## 2017-06-23 RX ADMIN — SENNOSIDES SCH TAB: 8.6 TABLET, FILM COATED ORAL at 10:11

## 2017-06-23 RX ADMIN — BACLOFEN SCH MG: 10 TABLET ORAL at 10:11

## 2017-06-23 RX ADMIN — METFORMIN HYDROCHLORIDE SCH: 500 TABLET ORAL at 06:18

## 2017-06-23 RX ADMIN — SODIUM HYPOCHLORITE SCH APPLIC: 2.5 SOLUTION TOPICAL at 06:17

## 2017-06-23 RX ADMIN — SODIUM HYPOCHLORITE SCH APPLIC: 2.5 SOLUTION TOPICAL at 14:27

## 2017-06-23 RX ADMIN — INSULIN ASPART SCH: 100 INJECTION, SOLUTION INTRAVENOUS; SUBCUTANEOUS at 06:18

## 2017-06-23 RX ADMIN — Medication SCH ML: at 10:10

## 2017-06-23 NOTE — EKG
Test Reason : 

Blood Pressure : ***/*** mmHG

Vent. Rate : 085 BPM     Atrial Rate : 085 BPM

   P-R Int : 146 ms          QRS Dur : 086 ms

    QT Int : 386 ms       P-R-T Axes : [phone] degrees

   QTc Int : 459 ms

 

*** POOR DATA QUALITY, INTERPRETATION MAY BE ADVERSELY AFFECTED

NORMAL SINUS RHYTHM

ST ELEVATION, CONSIDER EARLY REPOLARIZATION, PERICARDITIS, OR INJURY

ABNORMAL ECG

 

Confirmed by RYAN BOLAÑOS MD ([id number]) on 6/23/2017 10:39:21 AM

 

Referred By:             Confirmed By:RYAN BOLAÑOS MD

## 2017-06-23 NOTE — DS
Physical Examination


Vital Signs: 


 Vital Signs











Temperature  97.8 F   06/23/17 06:09


 


Pulse Rate  70   06/23/17 06:09


 


Respiratory Rate  18   06/23/17 06:09


 


Blood Pressure  100/69   06/23/17 06:09


 


O2 Sat by Pulse Oximetry (%)  99   06/22/17 21:00











Findings/Remarks: 


DISCUSSED WITH DR MATHEWS AND I AM IN AGREEMENT PATIENT IS NOT A CANDIDATE FOR 

FLAP GRAFT SURGERY TO RIGHT LOWER EXTREMITY ULCER BECAUSE OF TREMORS AND 

NEUROLOGICAL CONTRAINDICATION TO THIS SURGERY.


Constitutional: Yes: No Distress


Eyes: Yes: WNL


HENT: Yes: WNL


Neck: Yes: WNL


Cardiovascular: Yes: WNL


Respiratory: Yes: WNL


Gastrointestinal: Yes: WNL


Renal/: Yes: Incontinence


Musculoskeletal: Yes: Muscle Weakness


Extremities: Yes: Deformity


Edema: No


Peripheral Pulses WNL: Yes


Integumentary: Yes: Pressure Ulcer


Wound/Incision: Yes: Dressing Dry and Intact, Excoriated


Neurological: Yes: Paresthesia, Pre-Existing Deficit, Tremors, Unsteady Gait, 

Weakness


...Motor Strength: LUE, LLE, RUE, RLE


Psychiatric: Yes: Other


Labs: 


 CBC, BMP





 06/21/17 06:30 





 06/21/17 06:30 











Discharge Summary


Reason For Visit: DECUBITIS ULCER OF RIGHT ISCHIAL


Current Active Problems





Decubitus ulcer (Acute) 


Decubitus ulcer of right ischial area (Acute) 


Decubitus ulcer, stage 3 with infection (Acute) 


Decubitus ulcer, stage 4 with infection (Acute) 


Diabetes 1.5, managed as type 2 (Acute) 


Quadriplegia following spinal cord injury (Acute) 


Spinal cord compression, post-traumatic (Acute) 








Procedures: Principal: WOUND CARE


Other Procedures: NEUROLOGICAL WORKUP


Hospital Course: 


ADMITTED FOR UN D CARE, NEURO WORK-UP, GIVEN IV ABX, PATIENT NOT A CANDIDATE 

FOR FLAP-REPAIR SURGERY  TO RIGHT HIP WOUND, CONTINUE WOUND CARE AS INSTRUCTED 

BY PLASTIC SURGEON DR MATHEWS, IV ABX PER ID X [id number].


Condition: Fair





- Instructions


Diet, Activity, Other Instructions: 


ADA


Disposition: SKILLED NURSING FACILITY





- Home Medications


Comprehensive Discharge Medication List: 


Ambulatory Orders





Acetaminophen [Tylenol] 650 mg PO Q6H PRN 05/11/17 


Alendronate Na [Fosamax (Weekly)] 70 mg PO WEEKLY 05/11/17 


Ammonium Lactate Lotion [Lac-Hydrin 12] 1 applic TP ASDIR 05/11/17 


Calcium 250Mg/Vit-D 125 Units [Oscal 250 mg+D -] 1 combo PO DAILY 05/11/17 


Acetaminophen [Tylenol .Regular Strength -] 650 mg PO Q6H PRN #0 tablet 05/22/ 17 


Amino Acids/Protein Hydrolys [Prosource No Carb Liquid Pkt] 30 ml PO BID@0800,

1730  packet 05/22/17 


Baclofen [Lioresal -] 10 mg PO BID  tablet 05/22/17 


Metformin HCl [Glucophage -] 1,000 mg PO BID@0700,1630  tablet 05/22/17 


Multivitamins [Multivit ([facility] Formulary)] 1 tab PO DAILY  tab 05/22/17 


Picc Line Flush [Picc Line Flush -] 8 ml IVPUSH PRN PRN #0 ml 05/22/17 


Sennosides [Senna -] 1 tab PO BID  tablet 05/22/17 


Silver Sulfadiazine 1% Top Cr [Silvadene -] 1 applic TP BID  jar 05/22/17 


Zinc Sulfate [Zinc-220] 220 mg PO DAILY 06/16/17 


Acetaminophen [Tylenol .Regular Strength -] 650 mg PO Q4H PRN #0 tablet 06/23/ 17 


Amino Acids/Protein Hydrolys [Prosource No Carb Liquid Pkt] 30 ml PO BID@0800,

1730  packet 06/23/17 


Ertapenem Sodium [Invanz -] 1 gm IVPB DAILY 28 Days 06/23/17 


Insulin Sliding Scale [Novolog Vial Sliding Scale -] 1 vial SQ ACHS  units 06/23 /17 


Nystatin/Triamcinolone Top Cr [Mycolog II -] 1 applic TP BID  applic 06/23/17 


Oxycodone HCl [Roxicodone -] 5 mg PO Q6H PRN #0 tablet MDD 4 06/23/17 


Sodium Hypochlorite [Dakin's Solution 0.25% (Half-Strength) -] 1 applic TP TID  

ml 06/23/17

## 2017-06-23 NOTE — PN
Progress Note, Physician


History of Present Illness: 


stable


no issues





- Current Medication List


Current Medications: 


Active Medications





Acetaminophen (Tylenol -)  650 mg PO Q4H PRN


   PRN Reason: FEVER OR PAIN


   Last Admin: 06/17/17 02:19 Dose:  650 mg


Amino Acids (Prosource No Carb Liquid Pkt)  30 ml PO BID@0800,1730 [facility]


   Last Admin: 06/23/17 10:10 Dose:  30 ml


Baclofen (Lioresal -)  10 mg PO BID [facility]


   Last Admin: 06/23/17 10:11 Dose:  10 mg


Calcium/Vitamin D (Oscal 250 Mg+D -)  1 tab PO DAILY [facility]


   Last Admin: 06/23/17 10:11 Dose:  1 tab


Heparin Sodium (Porcine) (Heparin -)  5,000 unit SQ BID [facility]


   Last Admin: 06/23/17 10:10 Dose:  5,000 unit


Sodium Chloride (Normal Saline -)  1,000 mls @ 75 mls/hr IV ASDIR [facility]


   Last Admin: 06/23/17 03:35 Dose:  75 mls/hr


Ertapenem 1 gm/ Sodium (Chloride)  50 mls @ 100 mls/hr IVPB DAILY [facility]


   PRN Reason: Protocol


   Last Admin: 06/23/17 10:10 Dose:  100 mls/hr


Insulin Aspart (Novolog Vial Sliding Scale -)  1 vial SQ ACHS [facility]


   PRN Reason: Protocol


   Last Admin: 06/23/17 12:25 Dose:  Not Given


Lactic Acid (Lac-Hydrin 12)  1 applic TP ASDIR [facility]


   Last Admin: 06/22/17 18:20 Dose:  1 appful


Metformin HCl (Glucophage -)  1,000 mg PO BID@0700,1630 [facility]


   Last Admin: 06/23/17 06:18 Dose:  Not Given


Multivitamins/Minerals/Vitamin C (Tab-A-Vit -)  1 tab PO DAILY [facility]


   Last Admin: 06/23/17 10:11 Dose:  1 tab


Nystatin/Triamcinolone Acetonide (Mycolog Ii Cream -)  1 applic TP BID [facility]


   Last Admin: 06/23/17 10:14 Dose:  1 applic


Senna (Senna -)  1 tab PO BID [facility]


   Last Admin: 06/23/17 10:11 Dose:  1 tab


Sodium Hypochlorite (Dakin's Solution 0.25% (Half-Strength) -)  1 applic TP TID 

[facility]


   Last Admin: 06/23/17 06:17 Dose:  1 applic


Zinc Sulfate (Orazinc -)  220 mg PO DAILY [facility]


   Last Admin: 06/23/17 10:11 Dose:  220 mg











- Objective


Vital Signs: 


 Vital Signs











Temperature  97.8 F   06/23/17 06:09


 


Pulse Rate  70   06/23/17 06:09


 


Respiratory Rate  18   06/23/17 06:09


 


Blood Pressure  100/69   06/23/17 06:09


 


O2 Sat by Pulse Oximetry (%)  99   06/22/17 21:00











Constitutional: Yes: No Distress, Calm


Cardiovascular: Yes: Regular Rate and Rhythm


Respiratory: Yes: Regular, CTA Bilaterally


Gastrointestinal: Yes: Normal Bowel Sounds, Soft


Musculoskeletal: Yes: Other


Extremities: Yes: Other (paraplegia)


Wound/Incision: Yes: Dressing Dry and Intact, Other


Neurological: Yes: Alert, Oriented


Psychiatric: Yes: Alert, Oriented


Labs: 


 CBC, BMP





 06/21/17 06:30 





 06/21/17 06:30 











Assessment/Plan


cx results noted


Problem List





- Problems


(1) Decubitus ulcer of right ischial area


Code(s): L89.319 - PRESSURE ULCER OF RIGHT BUTTOCK, UNSPECIFIED STAGE   

Qualifiers: 


   





(2) Decubitus ulcer, stage 3 with infection


Code(s): L89.93 - PRESSURE ULCER OF UNSPECIFIED SITE, STAGE 3


L08.9 - LOCAL INFECTION OF THE SKIN AND SUBCUTANEOUS TISSUE, UNSP





(3) Decubitus ulcer, stage 4 with infection


Code(s): L89.94 - PRESSURE ULCER OF UNSPECIFIED SITE, STAGE 4


L08.9 - LOCAL INFECTION OF THE SKIN AND SUBCUTANEOUS TISSUE, UNSP





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


Code(s): E10.9 - TYPE 1 DIABETES MELLITUS WITHOUT COMPLICATIONS





(5) Diabetes


Code(s): E11.9 - TYPE 2 DIABETES MELLITUS WITHOUT COMPLICATIONS   Qualifiers: 


     Diabetes mellitus type: type 2     Diabetes mellitus complication status: 

with unspecified complications





(6) Hypertension


Code(s): I10 - ESSENTIAL (PRIMARY) HYPERTENSION   





(7) Paraplegia following spinal cord injury


Code(s): G82.20 - PARAPLEGIA, UNSPECIFIED





plan


continue current abx


wound care


rest as per primary


all cx result noted


patient to continue iv abx for couple of weeks

## 2017-06-29 ENCOUNTER — HOSPITAL ENCOUNTER (INPATIENT)
Dept: HOSPITAL 74 - JER | Age: 42
LOS: 1 days | Discharge: NURSING, REHAB OR LONG-TERM CARE | DRG: 383 | End: 2017-06-30
Attending: SPECIALIST | Admitting: SPECIALIST
Payer: COMMERCIAL

## 2017-06-29 VITALS — BODY MASS INDEX: 32.8 KG/M2

## 2017-06-29 DIAGNOSIS — E11.9: ICD-10-CM

## 2017-06-29 DIAGNOSIS — I10: ICD-10-CM

## 2017-06-29 DIAGNOSIS — G82.50: ICD-10-CM

## 2017-06-29 DIAGNOSIS — Z79.4: ICD-10-CM

## 2017-06-29 DIAGNOSIS — L02.415: Primary | ICD-10-CM

## 2017-06-29 DIAGNOSIS — L89.319: ICD-10-CM

## 2017-06-29 DIAGNOSIS — M81.0: ICD-10-CM

## 2017-06-29 DIAGNOSIS — L89.43: ICD-10-CM

## 2017-06-30 VITALS — HEART RATE: 71 BPM | DIASTOLIC BLOOD PRESSURE: 81 MMHG | TEMPERATURE: 98.5 F | SYSTOLIC BLOOD PRESSURE: 134 MMHG

## 2017-06-30 LAB
ALBUMIN SERPL-MCNC: 3.1 G/DL (ref 3.4–5)
ALP SERPL-CCNC: 110 U/L (ref 45–117)
ALT SERPL-CCNC: 14 U/L (ref 12–78)
ANION GAP SERPL CALC-SCNC: 8 MMOL/L (ref 8–16)
AST SERPL-CCNC: 19 U/L (ref 15–37)
BASOPHILS # BLD: 1.3 % (ref 0–2)
BILIRUB SERPL-MCNC: 0.3 MG/DL (ref 0.2–1)
CALCIUM SERPL-MCNC: 8.7 MG/DL (ref 8.5–10.1)
CO2 SERPL-SCNC: 30 MMOL/L (ref 21–32)
CREAT SERPL-MCNC: 0.4 MG/DL (ref 0.7–1.3)
DEPRECATED RDW RBC AUTO: 17.6 % (ref 11.9–15.9)
EOSINOPHIL # BLD: 1.9 % (ref 0–4.5)
GLUCOSE SERPL-MCNC: 125 MG/DL (ref 74–106)
INR BLD: 1.05 (ref 0.82–1.09)
MCH RBC QN AUTO: 23 PG (ref 25.7–33.7)
MCHC RBC AUTO-ENTMCNC: 31.2 G/DL (ref 32–35.9)
MCV RBC: 73.9 FL (ref 80–96)
NEUTROPHILS # BLD: 67.3 % (ref 42.8–82.8)
PLATELET # BLD AUTO: 412 K/MM3 (ref 134–434)
PMV BLD: 6.4 FL (ref 7.5–11.1)
PROT SERPL-MCNC: 8 G/DL (ref 6.4–8.2)
PT PNL PPP: 11.6 SEC (ref 9.98–11.88)
TROPONIN I SERPL-MCNC: < 0.02 NG/ML (ref 0–0.05)
WBC # BLD AUTO: 11.1 K/MM3 (ref 4–10)

## 2017-06-30 RX ADMIN — MORPHINE SULFATE ONE: 4 INJECTION, SOLUTION INTRAMUSCULAR; INTRAVENOUS at 09:54

## 2017-06-30 RX ADMIN — MORPHINE SULFATE ONE MG: 4 INJECTION, SOLUTION INTRAMUSCULAR; INTRAVENOUS at 09:39
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
## 2017-06-30 NOTE — EKG
Test Reason : 

Blood Pressure : ***/*** mmHG

Vent. Rate : 087 BPM     Atrial Rate : 087 BPM

   P-R Int : 158 ms          QRS Dur : 082 ms

    QT Int : 374 ms       P-R-T Axes : 024 104 050 degrees

   QTc Int : 450 ms

 

NORMAL SINUS RHYTHM

POSSIBLE RIGHT VENTRICULAR HYPERTROPHY

ST ELEVATION, CONSIDER EARLY REPOLARIZATION, PERICARDITIS, OR INJURY

ABNORMAL ECG

WHEN COMPARED WITH ECG OF 16-JUN-2017 14:56,

NO SIGNIFICANT CHANGE WAS FOUND

Confirmed by MD JODY, YUMI ([id number]) on 6/30/2017 5:04:30 PM

 

Referred By:             Confirmed By:YUMI VERA MD

## 2017-06-30 NOTE — PDOC
*Physical Exam





- Vital Signs


 Last Vital Signs











Temp Pulse Resp BP Pulse Ox


 


 98.0 F   80   18   116/70   98 


 


 06/30/17 09:19  06/30/17 09:19  06/30/17 09:19  06/30/17 09:19  06/30/17 09:19














<Xander Fuchs - Last Filed: 06/30/17 15:59>





- Vital Signs


 Last Vital Signs











Temp Pulse Resp BP Pulse Ox


 


 98.2 F   82   16   124/87   100 


 


 06/30/17 07:24  06/30/17 07:24  06/30/17 07:24  06/30/17 07:24  06/30/17 07:24














<Chela Cardona - Last Filed: 07/01/17 09:47>





ED Treatment Course





- LABORATORY


CBC & Chemistry Diagram: 


 06/30/17 01:40





 06/30/17 01:40





- ADDITIONAL ORDERS


Additional order review: 


 Laboratory  Results











  06/30/17 06/30/17 06/30/17





  07:16 01:40 01:40


 


INR   


 


Sodium    139


 


Potassium    4.2


 


Chloride    101


 


Carbon Dioxide    30


 


Anion Gap    8


 


BUN    7


 


Creatinine    0.4 L D


 


Creat Clearance w eGFR    > 60


 


POC Glucometer  103.54611  


 


Random Glucose    125 H D


 


Calcium    8.7


 


Total Bilirubin    0.3


 


AST    19  D


 


ALT    14  D


 


Alkaline Phosphatase    110  D


 


Total Protein    8.0


 


Albumin    3.1 L


 


Blood Type   O POSITIVE 


 


Antibody Screen   Negative 














  06/30/17





  01:40


 


INR  1.05


 


Sodium 


 


Potassium 


 


Chloride 


 


Carbon Dioxide 


 


Anion Gap 


 


BUN 


 


Creatinine 


 


Creat Clearance w eGFR 


 


POC Glucometer 


 


Random Glucose 


 


Calcium 


 


Total Bilirubin 


 


AST 


 


ALT 


 


Alkaline Phosphatase 


 


Total Protein 


 


Albumin 


 


Blood Type 


 


Antibody Screen 








 











  06/30/17 06/30/17





  07:16 01:40


 


RBC   4.70


 


MCV   73.9 L


 


MCHC   31.2 L


 


RDW   17.6 H


 


MPV   6.4 L


 


Neutrophils %   67.3


 


Lymphocytes %   24.6  D


 


Monocytes %   4.9


 


Eosinophils %   1.9


 


Basophils %   1.3


 


POC Glucometer  103.36954 














- Medications


Given in the ED: 


ED Medications














Discontinued Medications














Generic Name Dose Route Start Last Admin





  Trade Name Freq  PRN Reason Stop Dose Admin


 


Morphine Sulfate  4 mg 06/30/17 09:34 06/30/17 09:54





  Morphine Injection -  IVPUSH 06/30/17 09:35  Not Given





  ONCE ONE   


 


Vancomycin HCl  1,000 mg 06/30/17 07:15 06/30/17 07:25





  Vancomycin (Pre-Docked)  IVPB 06/30/17 07:16  1,000 mg





  ONCE ONE   Administration





  Protocol   














<Xander Fuchs - Last Filed: 06/30/17 15:59>





- LABORATORY


CBC & Chemistry Diagram: 


 06/30/17 01:40





 06/30/17 01:40





- ADDITIONAL ORDERS


Additional order review: 


 Laboratory  Results











  06/30/17 06/30/17 06/30/17





  07:16 01:40 01:40


 


INR   


 


Sodium    139


 


Potassium    4.2


 


Chloride    101


 


Carbon Dioxide    30


 


Anion Gap    8


 


BUN    7


 


Creatinine    0.4 L D


 


Creat Clearance w eGFR    > 60


 


POC Glucometer  103.28538  


 


Random Glucose    125 H D


 


Calcium    8.7


 


Total Bilirubin    0.3


 


AST    19  D


 


ALT    14  D


 


Alkaline Phosphatase    110  D


 


Total Protein    8.0


 


Albumin    3.1 L


 


Blood Type   O POSITIVE 


 


Antibody Screen   Negative 














  06/30/17





  01:40


 


INR  1.05


 


Sodium 


 


Potassium 


 


Chloride 


 


Carbon Dioxide 


 


Anion Gap 


 


BUN 


 


Creatinine 


 


Creat Clearance w eGFR 


 


POC Glucometer 


 


Random Glucose 


 


Calcium 


 


Total Bilirubin 


 


AST 


 


ALT 


 


Alkaline Phosphatase 


 


Total Protein 


 


Albumin 


 


Blood Type 


 


Antibody Screen 








 











  06/30/17 06/30/17





  07:16 01:40


 


RBC   4.70


 


MCV   73.9 L


 


MCHC   31.2 L


 


RDW   17.6 H


 


MPV   6.4 L


 


Neutrophils %   67.3


 


Lymphocytes %   24.6  D


 


Monocytes %   4.9


 


Eosinophils %   1.9


 


Basophils %   1.3


 


POC Glucometer  103.53802 














- Medications


Given in the ED: 


ED Medications














Discontinued Medications














Generic Name Dose Route Start Last Admin





  Trade Name Freq  PRN Reason Stop Dose Admin


 


Vancomycin HCl  1,000 mg 06/30/17 07:15 06/30/17 07:25





  Vancomycin (Pre-Docked)  IVPB 06/30/17 07:16  1,000 mg





  ONCE ONE   Administration





  Protocol   














<Chela Cardona - Last Filed: 07/01/17 09:47>





Medical Decision Making





- Medical Decision Making


06/30/17 08:24 Call to Dr. Pascual placed. Awaiting call back.


06/30/17 09:33 Dr. Pascual called into ED. Case discussed.


06/30/17 09:43 Call to Dr. Milian placed. Dr. Rojas is covering. 


06/30/17 09:44 Call to Dr. Rojas placed. Awaiting call back. 


06/30/17 10:15 Call to Dr. Vyas placed. Awaiting call back.


06/30/17 10:16 Call to Dr. Sawant placed. Awaiting call back. 


06/30/17 15:59 Call to Dr. Sawant placed. Awaiting call back.








<Xander Fuchs - Last Filed: 06/30/17 15:59>





- Medical Decision Making


06/30/17 08:24


EKG performed for admission:


Demonstrates ST elevations v2, v3


Pt interviewed


He states he had diabetes, denies HTN, or HLD


He denies chest pain


He denies shortness of breath





06/30/17 08:26


Prior EKG pulled


demonstrates ST elevations previously


Will order troponin


Will contact pt PMD








06/30/17 16:28








06/30/17 16:28


 Laboratory Tests











  06/30/17





  08:49


 


Creatine Kinase  49


 


Troponin I  < 0.02








call placed to Dr Sawant... he will see this patient in the ER


Call placed to Dr Monroe... he will see this patient in the ER


If there is no intention to I&D, the patient can be sent back to the nursing 

home


Pt Dr. Sawant, he does not think this patient needs an operative I&D but could 

benefit from IR drainage


Relayed to Dr. Pascual











<Chela Cardona - Last Filed: 07/01/17 09:47>





*DC/Admit/Observation/Transfer





<Xander Fuchs - Last Filed: 06/30/17 15:59>





<Chela Cardona - Last Filed: 07/01/17 09:47>


Diagnosis at time of Disposition: 


 Abscess of right thigh





- Discharge Dispostion


Disposition: SKILLED NURSING FACILITY


Condition at time of disposition: Stable

## 2017-06-30 NOTE — PDOC
*Physical Exam





- Vital Signs


 Last Vital Signs











Temp Pulse Resp BP Pulse Ox


 


 98.9 F   77   18   135/74   99 


 


 06/29/17 23:41  06/29/17 23:41  06/29/17 23:41  06/29/17 23:41  06/29/17 23:41














ED Treatment Course





- LABORATORY


CBC & Chemistry Diagram: 


 06/30/17 01:40





 06/30/17 01:40





- ADDITIONAL ORDERS


Additional order review: 


 Laboratory  Results











  06/30/17 06/30/17 06/30/17





  01:40 01:40 01:40


 


INR    1.05


 


Sodium   139 


 


Potassium   4.2 


 


Chloride   101 


 


Carbon Dioxide   30 


 


Anion Gap   8 


 


BUN   7 


 


Creatinine   0.4 L D 


 


Creat Clearance w eGFR   > 60 


 


Random Glucose   125 H D 


 


Calcium   8.7 


 


Total Bilirubin   0.3 


 


AST   19  D 


 


ALT   14  D 


 


Alkaline Phosphatase   110  D 


 


Total Protein   8.0 


 


Albumin   3.1 L 


 


Blood Type  O POSITIVE  


 


Antibody Screen  Negative  








 











  06/30/17





  01:40


 


RBC  4.70


 


MCV  73.9 L


 


MCHC  31.2 L


 


RDW  17.6 H


 


MPV  6.4 L


 


Neutrophils %  67.3


 


Lymphocytes %  24.6  D


 


Monocytes %  4.9


 


Eosinophils %  1.9


 


Basophils %  1.3














*DC/Admit/Observation/Transfer


Diagnosis at time of Disposition: 


 Abscess of right thigh





- Discharge Dispostion


Condition at time of disposition: Stable


Admit: Yes





- Referrals


Referrals: 


Jacinda Pascual MD [Primary Care Provider] - 





- Patient Instructions





- Post Discharge Activity

## 2017-06-30 NOTE — PN
Progress Note (short form)





- Note


Progress Note: 


ID consult requested


Patient was just discharged one week agot from [facility] 


He was seen by Dr Monroe who advised longterm iv antibiotics- he should f/u 

patient in the hospital.


d/w Dr Pascual- she asked me to place consult for Dr Monroe- which I did-

## 2017-06-30 NOTE — CONSULT
Consult


Consult Specialty:: infectious diseases


Reason for Consultation:: draiange from the  right leg





- History of Present Illness


Chief Complaint: pus draiange from the right leg


History of Present Illness: 


this patient known to me from last admission and discharged on long term abx 

for sacral abx started noticing draiange from the posterior part of the leg and 

was send from the nursing home back to the hospital


patient states that the drainage going on


i looked at the drainage and it looks purulent


42 year old male with PMH of Right decubitus ulcer, quadriplegia, Diabetes 

Mellitus type 2 and recurrent UTIs , 


patient has been worked up in the emergency room and the ct scan shows abscess 

with multiple air fluid levels


patient is already on long term abx








- History Source


History Provided By: Patient


Limitations to Obtaining History: No Limitations





- Past Medical History


CNS: Yes: Other (quadriplegia)


Renal/: Yes: UTI


Musculoskeletal: Yes: Other (osteoporosis)





- Alcohol/Substance Use


Hx Alcohol Use: No





- Smoking History


Smoking history: Unknown if ever smoked


Have you smoked in the past 12 months: No





Home Medications





- Allergies


Allergies/Adverse Reactions: 


 Allergies











Allergy/AdvReac Type Severity Reaction Status Date / Time


 


levofloxacin Allergy Unknown  Verified 06/29/17 23:41














- Home Medications


Home Medications: 


Ambulatory Orders





Alendronate Na [Fosamax (Weekly)] 70 mg PO WEEKLY 05/11/17 


Ammonium Lactate Lotion [Lac-Hydrin 12] 1 applic TP ASDIR 05/11/17 


Calcium 250Mg/Vit-D 125 Units [Oscal 250 mg+D -] 1 combo PO DAILY 05/11/17 


Amino Acids/Protein Hydrolys [Prosource No Carb Liquid Pkt] 30 ml PO BID@0800,

1730  packet 05/22/17 


Baclofen [Lioresal -] 10 mg PO BID  tablet 05/22/17 


Metformin HCl [Glucophage -] 1,000 mg PO BID@0700,1630  tablet 05/22/17 


Multivitamins [Multivit ([facility] Formulary)] 1 tab PO DAILY  tab 05/22/17 


Picc Line Flush [Picc Line Flush -] 8 ml IVPUSH PRN PRN #0 ml 05/22/17 


Sennosides [Senna -] 1 tab PO BID  tablet 05/22/17 


Silver Sulfadiazine 1% Top Cr [Silvadene -] 1 applic TP BID  jar 05/22/17 


Zinc Sulfate [Zinc-220] 220 mg PO DAILY 06/16/17 


Ertapenem Sodium [Invanz -] 1 gm IVPB DAILY 28 Days 06/23/17 


Insulin Sliding Scale [Novolog Vial Sliding Scale -] 1 vial SQ ACHS  units 06/23 /17 


Nystatin/Triamcinolone Top Cr [Mycolog II -] 1 applic TP BID  applic 06/23/17 


Oxycodone HCl [Roxicodone -] 5 mg PO Q6H PRN #0 tablet MDD 4 06/23/17 


Acetaminophen [Tylenol] 325 mg PO DAILY 06/30/17 











Review of Systems





- Review of Systems


Constitutional: reports: No Symptoms


Eyes: reports: No Symptoms


HENT: reports: No Symptoms


Neck: reports: No Symptoms


Cardiovascular: reports: No Symptoms


Respiratory: reports: No Symptoms


Gastrointestinal: reports: No Symptoms


Genitourinary: reports: No Symptoms


Musculoskeletal: reports: Other


Integumentary: reports: Wound, Other


Neurological: reports: No Symptoms


Hematology/Lymphatic: reports: No Symptoms


Psychiatric: reports: No Symptoms





Physical Exam


Vital Signs: 


 Vital Signs











Temperature  98.5 F   06/30/17 13:03


 


Pulse Rate  71   06/30/17 13:03


 


Respiratory Rate  16   06/30/17 13:03


 


Blood Pressure  134/81   06/30/17 13:03


 


O2 Sat by Pulse Oximetry (%)  100   06/30/17 13:03











Constitutional: Yes: No Distress, Calm


Cardiovascular: Yes: Regular Rate and Rhythm


Respiratory: Yes: Regular, CTA Bilaterally


Gastrointestinal: Yes: Normal Bowel Sounds, Soft


Musculoskeletal: Yes: Other


Extremities: Yes: Other (patient has draiange coming out from post part of the 

right leg)


Neurological: Yes: Alert, Oriented





Imaging





- Results


Cat Scan: Report Reviewed, Image Reviewed





Assessment/Plan


- Problems


(1) Decubitus ulcer of right ischial area


Code(s): L89.319 - PRESSURE ULCER OF RIGHT BUTTOCK, UNSPECIFIED STAGE   

Qualifiers: 


   





(2) Decubitus ulcer, stage 3 with infection


Code(s): L89.93 - PRESSURE ULCER OF UNSPECIFIED SITE, STAGE 3


L08.9 - LOCAL INFECTION OF THE SKIN AND SUBCUTANEOUS TISSUE, UNSP





(3) Decubitus ulcer, stage 4 with infection


Code(s): L89.94 - PRESSURE ULCER OF UNSPECIFIED SITE, STAGE 4


L08.9 - LOCAL INFECTION OF THE SKIN AND SUBCUTANEOUS TISSUE, UNSP





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


Code(s): E10.9 - TYPE 1 DIABETES MELLITUS WITHOUT COMPLICATIONS





(5) Diabetes


Code(s): E11.9 - TYPE 2 DIABETES MELLITUS WITHOUT COMPLICATIONS   Qualifiers: 


     Diabetes mellitus type: type 2     Diabetes mellitus complication status: 

with unspecified complications





(6) Hypertension


Code(s): I10 - ESSENTIAL (PRIMARY) HYPERTENSION   





(7) Paraplegia following spinal cord injury


Code(s): G82.20 - PARAPLEGIA, UNSPECIFIED





osteo of the sacral region





abscess of the right thigh with probable fistula draining








plan


patient is already on abx


abscess needs to be drained


and cx need to be send


patients abscess size is big and multiple air fluid levels are noted


once we have cx report then we will see if there needs to be change in abx


rest as per primary

## 2022-08-04 ENCOUNTER — HOSPITAL ENCOUNTER (INPATIENT)
Dept: HOSPITAL 74 - JER | Age: 47
LOS: 14 days | Discharge: NURSING, REHAB OR LONG-TERM CARE | DRG: 710 | End: 2022-08-18
Attending: FAMILY MEDICINE | Admitting: HOSPITALIST
Payer: COMMERCIAL

## 2022-08-04 VITALS — BODY MASS INDEX: 36.3 KG/M2

## 2022-08-04 DIAGNOSIS — B96.20: ICD-10-CM

## 2022-08-04 DIAGNOSIS — I10: ICD-10-CM

## 2022-08-04 DIAGNOSIS — E11.9: ICD-10-CM

## 2022-08-04 DIAGNOSIS — M86.9: ICD-10-CM

## 2022-08-04 DIAGNOSIS — D72.829: ICD-10-CM

## 2022-08-04 DIAGNOSIS — E87.1: ICD-10-CM

## 2022-08-04 DIAGNOSIS — N39.0: ICD-10-CM

## 2022-08-04 DIAGNOSIS — R53.2: ICD-10-CM

## 2022-08-04 DIAGNOSIS — L89.224: ICD-10-CM

## 2022-08-04 DIAGNOSIS — E66.9: ICD-10-CM

## 2022-08-04 DIAGNOSIS — E78.5: ICD-10-CM

## 2022-08-04 DIAGNOSIS — A41.51: Primary | ICD-10-CM

## 2022-08-04 LAB
ALBUMIN SERPL-MCNC: 1.9 G/DL (ref 3.4–5)
ALP SERPL-CCNC: 92 U/L (ref 45–117)
ALT SERPL-CCNC: 8 U/L (ref 13–61)
ANION GAP SERPL CALC-SCNC: 12 MMOL/L (ref 8–16)
ANION GAP SERPL CALC-SCNC: 9 MMOL/L (ref 8–16)
ANISOCYTOSIS BLD QL: 0
APPEARANCE UR: (no result)
APTT BLD: 35.5 SECONDS (ref 25.2–36.5)
AST SERPL-CCNC: 42 U/L (ref 15–37)
BACTERIA # UR AUTO: (no result) /UL (ref 0–1359)
BASE EXCESS BLDV CALC-SCNC: 1.6 MMOL/L (ref -2–2)
BASO STIPL BLD QL SMEAR: 0
BILIRUB SERPL-MCNC: 1.1 MG/DL (ref 0.2–1)
BILIRUB UR STRIP.AUTO-MCNC: NEGATIVE MG/DL
BUN SERPL-MCNC: 6.8 MG/DL (ref 7–18)
BUN SERPL-MCNC: 7.4 MG/DL (ref 7–18)
CALCIUM SERPL-MCNC: 7.4 MG/DL (ref 8.5–10.1)
CALCIUM SERPL-MCNC: 7.8 MG/DL (ref 8.5–10.1)
CASTS URNS QL MICRO: 1 /UL (ref 0–3.1)
CHLORIDE SERPL-SCNC: 86 MMOL/L (ref 98–107)
CHLORIDE SERPL-SCNC: 91 MMOL/L (ref 98–107)
CO2 SERPL-SCNC: 26 MMOL/L (ref 21–32)
CO2 SERPL-SCNC: 27 MMOL/L (ref 21–32)
COLOR UR: YELLOW
CREAT SERPL-MCNC: 0.3 MG/DL (ref 0.55–1.3)
CREAT SERPL-MCNC: 0.3 MG/DL (ref 0.55–1.3)
DACRYOCYTES BLD QL SMEAR: 0
DEPRECATED RDW RBC AUTO: 15.7 % (ref 11.9–15.9)
DOHLE BOD BLD QL SMEAR: 0
EPITH CASTS URNS QL MICRO: 1 /UL (ref 0–25.1)
GLUCOSE SERPL-MCNC: 84 MG/DL (ref 74–106)
GLUCOSE SERPL-MCNC: 85 MG/DL (ref 74–106)
HCT VFR BLD CALC: 34.4 % (ref 35.4–49)
HCT VFR BLDV CALC: 32 % (ref 35.4–49)
HELMET CELLS BLD QL SMEAR: 0
HGB BLD-MCNC: 11.2 GM/DL (ref 11.7–16.9)
HOWELL-JOLLY BOD BLD QL SMEAR: 0
INR BLD: 1.45 (ref 0.83–1.09)
KETONES UR QL STRIP: NEGATIVE
LEUKOCYTE ESTERASE UR QL STRIP.AUTO: (no result)
MACROCYTES BLD QL: 0
MCH RBC QN AUTO: 24.4 PG (ref 25.7–33.7)
MCHC RBC AUTO-ENTMCNC: 32.4 G/DL (ref 32–35.9)
MCV RBC: 75.3 FL (ref 80–96)
NITRITE UR QL STRIP: NEGATIVE
OVALOCYTES BLD QL SMEAR: 0
PCO2 BLDV: 40 MMHG (ref 38–52)
PH BLDV: 7.43 [PH] (ref 7.31–7.41)
PH UR: 6 [PH] (ref 5–8)
PLATELET # BLD AUTO: 272 10^3/UL (ref 134–434)
PMV BLD: 6.7 FL (ref 7.5–11.1)
PROT SERPL-MCNC: 7.2 G/DL (ref 6.4–8.2)
PROT UR QL STRIP: (no result)
PROT UR QL STRIP: NEGATIVE
PT PNL PPP: 16.7 SEC (ref 9.7–13)
RBC # BLD AUTO: 13 /UL (ref 0–23.9)
RBC # BLD AUTO: 4.57 M/MM3 (ref 4–5.6)
ROULEAUX BLD QL SMEAR: 0
SAO2 % BLDV: 75.3 % (ref 70–80)
SICKLE CELLS BLD QL SMEAR: 0
SODIUM SERPL-SCNC: 124 MMOL/L (ref 136–145)
SODIUM SERPL-SCNC: 126 MMOL/L (ref 136–145)
SP GR UR: 1.01 (ref 1.01–1.03)
TARGETS BLD QL SMEAR: 0
TOXIC GRANULES BLD QL SMEAR: 0
UROBILINOGEN UR STRIP-MCNC: 1 MG/DL (ref 0.2–1)
WBC # BLD AUTO: 24.9 K/MM3 (ref 4–10)
WBC # UR AUTO: 751 /UL (ref 0–25.8)

## 2022-08-04 PROCEDURE — U0005 INFEC AGEN DETEC AMPLI PROBE: HCPCS

## 2022-08-04 PROCEDURE — C1751 CATH, INF, PER/CENT/MIDLINE: HCPCS

## 2022-08-04 PROCEDURE — U0003 INFECTIOUS AGENT DETECTION BY NUCLEIC ACID (DNA OR RNA); SEVERE ACUTE RESPIRATORY SYNDROME CORONAVIRUS 2 (SARS-COV-2) (CORONAVIRUS DISEASE [COVID-19]), AMPLIFIED PROBE TECHNIQUE, MAKING USE OF HIGH THROUGHPUT TECHNOLOGIES AS DESCRIBED BY CMS-2020-01-R: HCPCS

## 2022-08-04 RX ADMIN — LINEZOLID SCH MLS/HR: 600 INJECTION, SOLUTION INTRAVENOUS at 18:31

## 2022-08-05 LAB
ALBUMIN SERPL-MCNC: 1.8 G/DL (ref 3.4–5)
ALP SERPL-CCNC: 84 U/L (ref 45–117)
ALT SERPL-CCNC: < 6 U/L (ref 13–61)
ANION GAP SERPL CALC-SCNC: 11 MMOL/L (ref 8–16)
ANISOCYTOSIS BLD QL: (no result)
AST SERPL-CCNC: 29 U/L (ref 15–37)
BILIRUB SERPL-MCNC: 0.8 MG/DL (ref 0.2–1)
BUN SERPL-MCNC: 7.6 MG/DL (ref 7–18)
CALCIUM SERPL-MCNC: 7.8 MG/DL (ref 8.5–10.1)
CHLORIDE SERPL-SCNC: 91 MMOL/L (ref 98–107)
CO2 SERPL-SCNC: 29 MMOL/L (ref 21–32)
CREAT SERPL-MCNC: 0.3 MG/DL (ref 0.55–1.3)
DACRYOCYTES BLD QL SMEAR: (no result)
DEPRECATED RDW RBC AUTO: 15.8 % (ref 11.9–15.9)
GLUCOSE SERPL-MCNC: 76 MG/DL (ref 74–106)
HCT VFR BLD CALC: 35 % (ref 35.4–49)
HGB BLD-MCNC: 11.4 GM/DL (ref 11.7–16.9)
MACROCYTES BLD QL: 0
MCH RBC QN AUTO: 24.7 PG (ref 25.7–33.7)
MCHC RBC AUTO-ENTMCNC: 32.6 G/DL (ref 32–35.9)
MCV RBC: 75.9 FL (ref 80–96)
OVALOCYTES BLD QL SMEAR: (no result)
PLATELET # BLD AUTO: 319 10^3/UL (ref 134–434)
PMV BLD: 6.9 FL (ref 7.5–11.1)
PROT SERPL-MCNC: 7.2 G/DL (ref 6.4–8.2)
RBC # BLD AUTO: 4.61 M/MM3 (ref 4–5.6)
SODIUM SERPL-SCNC: 130 MMOL/L (ref 136–145)
WBC # BLD AUTO: 21.9 K/MM3 (ref 4–10)

## 2022-08-05 RX ADMIN — PIPERACILLIN AND TAZOBACTAM SCH MLS/HR: 4; .5 INJECTION, POWDER, LYOPHILIZED, FOR SOLUTION INTRAVENOUS at 07:17

## 2022-08-05 RX ADMIN — ACETAMINOPHEN PRN MG: 10 INJECTION, SOLUTION INTRAVENOUS at 17:20

## 2022-08-05 RX ADMIN — LINEZOLID SCH MLS/HR: 600 INJECTION, SOLUTION INTRAVENOUS at 09:39

## 2022-08-05 RX ADMIN — PIPERACILLIN SODIUM,TAZOBACTAM SODIUM SCH MLS/HR: 3; .375 INJECTION, POWDER, FOR SOLUTION INTRAVENOUS at 18:10

## 2022-08-05 RX ADMIN — PIPERACILLIN AND TAZOBACTAM SCH MLS/HR: 4; .5 INJECTION, POWDER, LYOPHILIZED, FOR SOLUTION INTRAVENOUS at 15:43

## 2022-08-05 RX ADMIN — SODIUM CHLORIDE SCH MLS/HR: 9 INJECTION, SOLUTION INTRAVENOUS at 07:17

## 2022-08-05 RX ADMIN — ACETAMINOPHEN PRN MG: 10 INJECTION, SOLUTION INTRAVENOUS at 07:17

## 2022-08-06 LAB
ALBUMIN SERPL-MCNC: 1.7 G/DL (ref 3.4–5)
ALP SERPL-CCNC: 72 U/L (ref 45–117)
ALT SERPL-CCNC: 6 U/L (ref 13–61)
ANION GAP SERPL CALC-SCNC: 10 MMOL/L (ref 8–16)
AST SERPL-CCNC: 34 U/L (ref 15–37)
BASOPHILS # BLD: 0.7 % (ref 0–2)
BILIRUB SERPL-MCNC: 0.8 MG/DL (ref 0.2–1)
BUN SERPL-MCNC: 5.5 MG/DL (ref 7–18)
CALCIUM SERPL-MCNC: 7.4 MG/DL (ref 8.5–10.1)
CHLORIDE SERPL-SCNC: 95 MMOL/L (ref 98–107)
CO2 SERPL-SCNC: 28 MMOL/L (ref 21–32)
CREAT SERPL-MCNC: 0.2 MG/DL (ref 0.55–1.3)
DEPRECATED RDW RBC AUTO: 16.2 % (ref 11.9–15.9)
EOSINOPHIL # BLD: 0.2 % (ref 0–4.5)
GLUCOSE SERPL-MCNC: 63 MG/DL (ref 74–106)
HCT VFR BLD CALC: 33.1 % (ref 35.4–49)
HGB BLD-MCNC: 10.7 GM/DL (ref 11.7–16.9)
LYMPHOCYTES # BLD: 12 % (ref 8–40)
MCH RBC QN AUTO: 24.9 PG (ref 25.7–33.7)
MCHC RBC AUTO-ENTMCNC: 32.4 G/DL (ref 32–35.9)
MCV RBC: 76.9 FL (ref 80–96)
MONOCYTES # BLD AUTO: 6.8 % (ref 3.8–10.2)
NEUTROPHILS # BLD: 80.3 % (ref 42.8–82.8)
PLATELET # BLD AUTO: 352 10^3/UL (ref 134–434)
PMV BLD: 7.1 FL (ref 7.5–11.1)
PROT SERPL-MCNC: 6.4 G/DL (ref 6.4–8.2)
RBC # BLD AUTO: 4.3 M/MM3 (ref 4–5.6)
SODIUM SERPL-SCNC: 133 MMOL/L (ref 136–145)
WBC # BLD AUTO: 13.1 K/MM3 (ref 4–10)

## 2022-08-06 RX ADMIN — ACETAMINOPHEN PRN MG: 10 INJECTION, SOLUTION INTRAVENOUS at 03:37

## 2022-08-06 RX ADMIN — PIPERACILLIN SODIUM,TAZOBACTAM SODIUM SCH MLS/HR: 3; .375 INJECTION, POWDER, FOR SOLUTION INTRAVENOUS at 09:45

## 2022-08-06 RX ADMIN — SODIUM CHLORIDE SCH MLS/HR: 9 INJECTION, SOLUTION INTRAVENOUS at 19:45

## 2022-08-06 RX ADMIN — PIPERACILLIN SODIUM,TAZOBACTAM SODIUM SCH MLS/HR: 3; .375 INJECTION, POWDER, FOR SOLUTION INTRAVENOUS at 17:57

## 2022-08-06 RX ADMIN — SODIUM CHLORIDE SCH MLS/HR: 9 INJECTION, SOLUTION INTRAVENOUS at 06:01

## 2022-08-06 RX ADMIN — PIPERACILLIN SODIUM,TAZOBACTAM SODIUM SCH MLS/HR: 3; .375 INJECTION, POWDER, FOR SOLUTION INTRAVENOUS at 02:37

## 2022-08-07 LAB
ALBUMIN SERPL-MCNC: 1.6 G/DL (ref 3.4–5)
ALP SERPL-CCNC: 83 U/L (ref 45–117)
ALT SERPL-CCNC: 6 U/L (ref 13–61)
ANION GAP SERPL CALC-SCNC: 10 MMOL/L (ref 8–16)
AST SERPL-CCNC: 40 U/L (ref 15–37)
BASOPHILS # BLD: 0.8 % (ref 0–2)
BILIRUB SERPL-MCNC: 0.5 MG/DL (ref 0.2–1)
BUN SERPL-MCNC: 3.6 MG/DL (ref 7–18)
CALCIUM SERPL-MCNC: 7.3 MG/DL (ref 8.5–10.1)
CHLORIDE SERPL-SCNC: 100 MMOL/L (ref 98–107)
CO2 SERPL-SCNC: 27 MMOL/L (ref 21–32)
CREAT SERPL-MCNC: 0.3 MG/DL (ref 0.55–1.3)
DEPRECATED RDW RBC AUTO: 15.9 % (ref 11.9–15.9)
EOSINOPHIL # BLD: 0.7 % (ref 0–4.5)
GLUCOSE SERPL-MCNC: 124 MG/DL (ref 74–106)
HCT VFR BLD CALC: 33.5 % (ref 35.4–49)
HGB BLD-MCNC: 10.8 GM/DL (ref 11.7–16.9)
LYMPHOCYTES # BLD: 19.5 % (ref 8–40)
MCH RBC QN AUTO: 24.9 PG (ref 25.7–33.7)
MCHC RBC AUTO-ENTMCNC: 32.2 G/DL (ref 32–35.9)
MCV RBC: 77.3 FL (ref 80–96)
MONOCYTES # BLD AUTO: 6.2 % (ref 3.8–10.2)
NEUTROPHILS # BLD: 72.8 % (ref 42.8–82.8)
PLATELET # BLD AUTO: 379 10^3/UL (ref 134–434)
PMV BLD: 6.3 FL (ref 7.5–11.1)
PROT SERPL-MCNC: 6.5 G/DL (ref 6.4–8.2)
RBC # BLD AUTO: 4.33 M/MM3 (ref 4–5.6)
SODIUM SERPL-SCNC: 137 MMOL/L (ref 136–145)
WBC # BLD AUTO: 8.2 K/MM3 (ref 4–10)

## 2022-08-07 RX ADMIN — Medication SCH ML: at 09:19

## 2022-08-07 RX ADMIN — Medication SCH MG: at 09:19

## 2022-08-07 RX ADMIN — OXYCODONE HYDROCHLORIDE AND ACETAMINOPHEN SCH MG: 500 TABLET ORAL at 09:19

## 2022-08-07 RX ADMIN — Medication SCH ML: at 17:27

## 2022-08-07 RX ADMIN — PANTOPRAZOLE SODIUM SCH MG: 20 TABLET, DELAYED RELEASE ORAL at 21:05

## 2022-08-07 RX ADMIN — MEROPENEM SCH MLS/HR: 1 INJECTION, POWDER, FOR SOLUTION INTRAVENOUS at 12:59

## 2022-08-07 RX ADMIN — ATORVASTATIN CALCIUM SCH MG: 10 TABLET, FILM COATED ORAL at 21:05

## 2022-08-07 RX ADMIN — SODIUM CHLORIDE SCH: 9 INJECTION, SOLUTION INTRAVENOUS at 07:06

## 2022-08-07 RX ADMIN — Medication SCH ML: at 12:07

## 2022-08-07 RX ADMIN — GABAPENTIN SCH MG: 100 CAPSULE ORAL at 21:05

## 2022-08-07 RX ADMIN — MULTIVITAMIN TABLET SCH TAB: TABLET at 09:19

## 2022-08-07 RX ADMIN — OXYCODONE HYDROCHLORIDE AND ACETAMINOPHEN SCH MG: 500 TABLET ORAL at 21:05

## 2022-08-07 RX ADMIN — LATANOPROST SCH DROP: 50 SOLUTION OPHTHALMIC at 21:05

## 2022-08-07 RX ADMIN — SODIUM CHLORIDE SCH MLS/HR: 9 INJECTION, SOLUTION INTRAVENOUS at 13:00

## 2022-08-07 RX ADMIN — PIPERACILLIN SODIUM,TAZOBACTAM SODIUM SCH MLS/HR: 3; .375 INJECTION, POWDER, FOR SOLUTION INTRAVENOUS at 01:32

## 2022-08-07 RX ADMIN — PIPERACILLIN SODIUM,TAZOBACTAM SODIUM SCH MLS/HR: 3; .375 INJECTION, POWDER, FOR SOLUTION INTRAVENOUS at 09:19

## 2022-08-07 RX ADMIN — MEROPENEM SCH MLS/HR: 1 INJECTION, POWDER, FOR SOLUTION INTRAVENOUS at 17:26

## 2022-08-08 LAB
ALBUMIN SERPL-MCNC: 1.7 G/DL (ref 3.4–5)
ALP SERPL-CCNC: 82 U/L (ref 45–117)
ALT SERPL-CCNC: 8 U/L (ref 13–61)
ANION GAP SERPL CALC-SCNC: 6 MMOL/L (ref 8–16)
ANISOCYTOSIS BLD QL: 0
AST SERPL-CCNC: 37 U/L (ref 15–37)
BASO STIPL BLD QL SMEAR: 0
BILIRUB SERPL-MCNC: 1 MG/DL (ref 0.2–1)
BUN SERPL-MCNC: 4.9 MG/DL (ref 7–18)
CALCIUM SERPL-MCNC: 7.4 MG/DL (ref 8.5–10.1)
CHLORIDE SERPL-SCNC: 103 MMOL/L (ref 98–107)
CO2 SERPL-SCNC: 30 MMOL/L (ref 21–32)
CREAT SERPL-MCNC: < 0.2 MG/DL (ref 0.55–1.3)
DACRYOCYTES BLD QL SMEAR: 0
DEPRECATED RDW RBC AUTO: 16.1 % (ref 11.9–15.9)
DOHLE BOD BLD QL SMEAR: 0
GLUCOSE SERPL-MCNC: 79 MG/DL (ref 74–106)
HCT VFR BLD CALC: 34.5 % (ref 35.4–49)
HELMET CELLS BLD QL SMEAR: 0
HGB BLD-MCNC: 11.1 GM/DL (ref 11.7–16.9)
HOWELL-JOLLY BOD BLD QL SMEAR: 0
MACROCYTES BLD QL: 0
MCH RBC QN AUTO: 24.7 PG (ref 25.7–33.7)
MCHC RBC AUTO-ENTMCNC: 32.2 G/DL (ref 32–35.9)
MCV RBC: 76.6 FL (ref 80–96)
OVALOCYTES BLD QL SMEAR: 0
PLATELET # BLD AUTO: 423 10^3/UL (ref 134–434)
PMV BLD: 6.2 FL (ref 7.5–11.1)
PROT SERPL-MCNC: 6.5 G/DL (ref 6.4–8.2)
RBC # BLD AUTO: 4.51 M/MM3 (ref 4–5.6)
ROULEAUX BLD QL SMEAR: 0
SICKLE CELLS BLD QL SMEAR: 0
SODIUM SERPL-SCNC: 139 MMOL/L (ref 136–145)
TARGETS BLD QL SMEAR: 0
TOXIC GRANULES BLD QL SMEAR: 0
WBC # BLD AUTO: 6.8 K/MM3 (ref 4–10)

## 2022-08-08 RX ADMIN — MEROPENEM SCH MLS/HR: 1 INJECTION, POWDER, FOR SOLUTION INTRAVENOUS at 10:52

## 2022-08-08 RX ADMIN — MEROPENEM SCH MLS/HR: 1 INJECTION, POWDER, FOR SOLUTION INTRAVENOUS at 02:57

## 2022-08-08 RX ADMIN — MULTIVITAMIN TABLET SCH TAB: TABLET at 10:53

## 2022-08-08 RX ADMIN — PANTOPRAZOLE SODIUM SCH MG: 20 TABLET, DELAYED RELEASE ORAL at 21:43

## 2022-08-08 RX ADMIN — Medication SCH MG: at 10:53

## 2022-08-08 RX ADMIN — Medication SCH ML: at 17:36

## 2022-08-08 RX ADMIN — Medication SCH ML: at 12:21

## 2022-08-08 RX ADMIN — LATANOPROST SCH DROP: 50 SOLUTION OPHTHALMIC at 21:48

## 2022-08-08 RX ADMIN — PANTOPRAZOLE SODIUM SCH MG: 20 TABLET, DELAYED RELEASE ORAL at 10:53

## 2022-08-08 RX ADMIN — OXYCODONE HYDROCHLORIDE AND ACETAMINOPHEN SCH MG: 500 TABLET ORAL at 10:53

## 2022-08-08 RX ADMIN — ACETAMINOPHEN PRN MG: 10 INJECTION, SOLUTION INTRAVENOUS at 04:16

## 2022-08-08 RX ADMIN — GABAPENTIN SCH MG: 100 CAPSULE ORAL at 21:43

## 2022-08-08 RX ADMIN — SODIUM CHLORIDE SCH: 9 INJECTION, SOLUTION INTRAVENOUS at 03:02

## 2022-08-08 RX ADMIN — OXYCODONE HYDROCHLORIDE AND ACETAMINOPHEN SCH MG: 500 TABLET ORAL at 21:43

## 2022-08-08 RX ADMIN — GABAPENTIN SCH MG: 100 CAPSULE ORAL at 10:53

## 2022-08-08 RX ADMIN — SODIUM CHLORIDE SCH MLS/HR: 9 INJECTION, SOLUTION INTRAVENOUS at 02:58

## 2022-08-08 RX ADMIN — Medication SCH ML: at 09:31

## 2022-08-08 RX ADMIN — ATORVASTATIN CALCIUM SCH MG: 10 TABLET, FILM COATED ORAL at 21:43

## 2022-08-08 RX ADMIN — MEROPENEM SCH MLS/HR: 1 INJECTION, POWDER, FOR SOLUTION INTRAVENOUS at 17:36

## 2022-08-09 LAB — DEPRECATED PREALB SERPL-CCNC: 8.9 MG/DL (ref 20–40)

## 2022-08-09 RX ADMIN — Medication SCH ML: at 18:26

## 2022-08-09 RX ADMIN — MEROPENEM SCH MLS/HR: 1 INJECTION, POWDER, FOR SOLUTION INTRAVENOUS at 01:10

## 2022-08-09 RX ADMIN — OXYCODONE HYDROCHLORIDE AND ACETAMINOPHEN SCH MG: 500 TABLET ORAL at 22:14

## 2022-08-09 RX ADMIN — OXYCODONE HYDROCHLORIDE AND ACETAMINOPHEN SCH MG: 500 TABLET ORAL at 10:04

## 2022-08-09 RX ADMIN — PANTOPRAZOLE SODIUM SCH MG: 20 TABLET, DELAYED RELEASE ORAL at 10:04

## 2022-08-09 RX ADMIN — MEROPENEM SCH MLS/HR: 1 INJECTION, POWDER, FOR SOLUTION INTRAVENOUS at 10:03

## 2022-08-09 RX ADMIN — GABAPENTIN SCH MG: 100 CAPSULE ORAL at 10:04

## 2022-08-09 RX ADMIN — Medication SCH ML: at 12:48

## 2022-08-09 RX ADMIN — Medication SCH MG: at 10:04

## 2022-08-09 RX ADMIN — Medication SCH ML: at 09:03

## 2022-08-09 RX ADMIN — GABAPENTIN SCH MG: 100 CAPSULE ORAL at 22:14

## 2022-08-09 RX ADMIN — LATANOPROST SCH DROP: 50 SOLUTION OPHTHALMIC at 22:14

## 2022-08-09 RX ADMIN — PANTOPRAZOLE SODIUM SCH MG: 20 TABLET, DELAYED RELEASE ORAL at 22:14

## 2022-08-09 RX ADMIN — ATORVASTATIN CALCIUM SCH MG: 10 TABLET, FILM COATED ORAL at 22:14

## 2022-08-09 RX ADMIN — MEROPENEM SCH MLS/HR: 1 INJECTION, POWDER, FOR SOLUTION INTRAVENOUS at 18:10

## 2022-08-09 RX ADMIN — MULTIVITAMIN TABLET SCH TAB: TABLET at 10:04

## 2022-08-10 RX ADMIN — PANTOPRAZOLE SODIUM SCH MG: 20 TABLET, DELAYED RELEASE ORAL at 21:31

## 2022-08-10 RX ADMIN — OXYCODONE HYDROCHLORIDE AND ACETAMINOPHEN SCH MG: 500 TABLET ORAL at 10:36

## 2022-08-10 RX ADMIN — PANTOPRAZOLE SODIUM SCH MG: 20 TABLET, DELAYED RELEASE ORAL at 10:36

## 2022-08-10 RX ADMIN — Medication SCH ML: at 08:00

## 2022-08-10 RX ADMIN — Medication SCH ML: at 17:45

## 2022-08-10 RX ADMIN — COLLAGENASE SANTYL SCH APPLIC: 250 OINTMENT TOPICAL at 10:47

## 2022-08-10 RX ADMIN — MEROPENEM SCH MLS/HR: 1 INJECTION, POWDER, FOR SOLUTION INTRAVENOUS at 10:35

## 2022-08-10 RX ADMIN — OXYCODONE HYDROCHLORIDE AND ACETAMINOPHEN SCH MG: 500 TABLET ORAL at 21:31

## 2022-08-10 RX ADMIN — MEROPENEM SCH MLS/HR: 1 INJECTION, POWDER, FOR SOLUTION INTRAVENOUS at 01:49

## 2022-08-10 RX ADMIN — Medication SCH ML: at 12:00

## 2022-08-10 RX ADMIN — GABAPENTIN SCH MG: 100 CAPSULE ORAL at 21:30

## 2022-08-10 RX ADMIN — Medication SCH MG: at 10:36

## 2022-08-10 RX ADMIN — LATANOPROST SCH DROP: 50 SOLUTION OPHTHALMIC at 21:32

## 2022-08-10 RX ADMIN — ATORVASTATIN CALCIUM SCH MG: 10 TABLET, FILM COATED ORAL at 21:31

## 2022-08-10 RX ADMIN — MULTIVITAMIN TABLET SCH TAB: TABLET at 10:36

## 2022-08-10 RX ADMIN — MEROPENEM SCH: 1 INJECTION, POWDER, FOR SOLUTION INTRAVENOUS at 18:00

## 2022-08-10 RX ADMIN — MEROPENEM SCH MLS/HR: 1 INJECTION, POWDER, FOR SOLUTION INTRAVENOUS at 18:50

## 2022-08-10 RX ADMIN — GABAPENTIN SCH MG: 100 CAPSULE ORAL at 10:36

## 2022-08-11 LAB
ALBUMIN SERPL-MCNC: 2.1 G/DL (ref 3.4–5)
ALP SERPL-CCNC: 97 U/L (ref 45–117)
ALT SERPL-CCNC: 11 U/L (ref 13–61)
ANION GAP SERPL CALC-SCNC: 6 MMOL/L (ref 8–16)
AST SERPL-CCNC: 37 U/L (ref 15–37)
BASOPHILS # BLD: 1.6 % (ref 0–2)
BILIRUB SERPL-MCNC: 0.4 MG/DL (ref 0.2–1)
BUN SERPL-MCNC: 6.3 MG/DL (ref 7–18)
CALCIUM SERPL-MCNC: 8.1 MG/DL (ref 8.5–10.1)
CHLORIDE SERPL-SCNC: 103 MMOL/L (ref 98–107)
CO2 SERPL-SCNC: 33 MMOL/L (ref 21–32)
CREAT SERPL-MCNC: 0.2 MG/DL (ref 0.55–1.3)
DEPRECATED RDW RBC AUTO: 15.9 % (ref 11.9–15.9)
EOSINOPHIL # BLD: 2.2 % (ref 0–4.5)
GLUCOSE SERPL-MCNC: 79 MG/DL (ref 74–106)
HCT VFR BLD CALC: 35.7 % (ref 35.4–49)
HGB BLD-MCNC: 11.3 GM/DL (ref 11.7–16.9)
LYMPHOCYTES # BLD: 26.1 % (ref 8–40)
MCH RBC QN AUTO: 24.7 PG (ref 25.7–33.7)
MCHC RBC AUTO-ENTMCNC: 31.6 G/DL (ref 32–35.9)
MCV RBC: 78 FL (ref 80–96)
MONOCYTES # BLD AUTO: 3.9 % (ref 3.8–10.2)
NEUTROPHILS # BLD: 66.2 % (ref 42.8–82.8)
PLATELET # BLD AUTO: 430 10^3/UL (ref 134–434)
PMV BLD: 6.3 FL (ref 7.5–11.1)
PROT SERPL-MCNC: 7.1 G/DL (ref 6.4–8.2)
RBC # BLD AUTO: 4.58 M/MM3 (ref 4–5.6)
SODIUM SERPL-SCNC: 141 MMOL/L (ref 136–145)
WBC # BLD AUTO: 7.4 K/MM3 (ref 4–10)

## 2022-08-11 RX ADMIN — OXYCODONE HYDROCHLORIDE AND ACETAMINOPHEN SCH MG: 500 TABLET ORAL at 21:42

## 2022-08-11 RX ADMIN — ATORVASTATIN CALCIUM SCH MG: 10 TABLET, FILM COATED ORAL at 21:42

## 2022-08-11 RX ADMIN — GABAPENTIN SCH MG: 100 CAPSULE ORAL at 09:57

## 2022-08-11 RX ADMIN — Medication SCH MG: at 09:57

## 2022-08-11 RX ADMIN — OXYCODONE HYDROCHLORIDE AND ACETAMINOPHEN SCH MG: 500 TABLET ORAL at 09:57

## 2022-08-11 RX ADMIN — MULTIVITAMIN TABLET SCH TAB: TABLET at 09:57

## 2022-08-11 RX ADMIN — Medication SCH ML: at 17:30

## 2022-08-11 RX ADMIN — LATANOPROST SCH DROP: 50 SOLUTION OPHTHALMIC at 21:42

## 2022-08-11 RX ADMIN — ACETAMINOPHEN PRN MG: 10 INJECTION, SOLUTION INTRAVENOUS at 17:20

## 2022-08-11 RX ADMIN — Medication SCH ML: at 12:41

## 2022-08-11 RX ADMIN — Medication SCH ML: at 09:09

## 2022-08-11 RX ADMIN — GABAPENTIN SCH MG: 100 CAPSULE ORAL at 21:42

## 2022-08-11 RX ADMIN — PANTOPRAZOLE SODIUM SCH MG: 20 TABLET, DELAYED RELEASE ORAL at 09:57

## 2022-08-11 RX ADMIN — COLLAGENASE SANTYL SCH: 250 OINTMENT TOPICAL at 09:57

## 2022-08-11 RX ADMIN — MEROPENEM SCH MLS/HR: 1 INJECTION, POWDER, FOR SOLUTION INTRAVENOUS at 17:30

## 2022-08-11 RX ADMIN — PANTOPRAZOLE SODIUM SCH MG: 20 TABLET, DELAYED RELEASE ORAL at 21:42

## 2022-08-11 RX ADMIN — MEROPENEM SCH MLS/HR: 1 INJECTION, POWDER, FOR SOLUTION INTRAVENOUS at 11:25

## 2022-08-11 RX ADMIN — MEROPENEM SCH MLS/HR: 1 INJECTION, POWDER, FOR SOLUTION INTRAVENOUS at 02:13

## 2022-08-12 PROCEDURE — 0QB30ZZ EXCISION OF LEFT PELVIC BONE, OPEN APPROACH: ICD-10-PCS | Performed by: SURGERY

## 2022-08-12 RX ADMIN — ACETAMINOPHEN PRN MG: 325 TABLET ORAL at 21:33

## 2022-08-12 RX ADMIN — LATANOPROST SCH DROP: 50 SOLUTION OPHTHALMIC at 21:41

## 2022-08-12 RX ADMIN — PANTOPRAZOLE SODIUM SCH MG: 20 TABLET, DELAYED RELEASE ORAL at 21:35

## 2022-08-12 RX ADMIN — OXYCODONE HYDROCHLORIDE AND ACETAMINOPHEN SCH MG: 500 TABLET ORAL at 21:36

## 2022-08-12 RX ADMIN — ATORVASTATIN CALCIUM SCH MG: 10 TABLET, FILM COATED ORAL at 21:33

## 2022-08-12 RX ADMIN — Medication SCH: at 09:48

## 2022-08-12 RX ADMIN — MULTIVITAMIN TABLET SCH: TABLET at 09:49

## 2022-08-12 RX ADMIN — PANTOPRAZOLE SODIUM SCH: 20 TABLET, DELAYED RELEASE ORAL at 09:48

## 2022-08-12 RX ADMIN — Medication SCH ML: at 18:16

## 2022-08-12 RX ADMIN — Medication SCH: at 12:16

## 2022-08-12 RX ADMIN — COLLAGENASE SANTYL SCH: 250 OINTMENT TOPICAL at 09:50

## 2022-08-12 RX ADMIN — MEROPENEM SCH MLS/HR: 1 INJECTION, POWDER, FOR SOLUTION INTRAVENOUS at 01:33

## 2022-08-12 RX ADMIN — Medication SCH: at 07:57

## 2022-08-12 RX ADMIN — MEROPENEM SCH MLS/HR: 1 INJECTION, POWDER, FOR SOLUTION INTRAVENOUS at 18:15

## 2022-08-12 RX ADMIN — MEROPENEM SCH MLS/HR: 1 INJECTION, POWDER, FOR SOLUTION INTRAVENOUS at 09:49

## 2022-08-12 RX ADMIN — GABAPENTIN SCH MG: 100 CAPSULE ORAL at 21:35

## 2022-08-12 RX ADMIN — OXYCODONE HYDROCHLORIDE AND ACETAMINOPHEN SCH: 500 TABLET ORAL at 09:49

## 2022-08-12 RX ADMIN — GABAPENTIN SCH: 100 CAPSULE ORAL at 09:48

## 2022-08-13 RX ADMIN — MULTIVITAMIN TABLET SCH TAB: TABLET at 09:28

## 2022-08-13 RX ADMIN — PANTOPRAZOLE SODIUM SCH MG: 20 TABLET, DELAYED RELEASE ORAL at 22:22

## 2022-08-13 RX ADMIN — LATANOPROST SCH DROP: 50 SOLUTION OPHTHALMIC at 22:21

## 2022-08-13 RX ADMIN — PANTOPRAZOLE SODIUM SCH MG: 20 TABLET, DELAYED RELEASE ORAL at 09:27

## 2022-08-13 RX ADMIN — ACETAMINOPHEN PRN MG: 325 TABLET ORAL at 22:20

## 2022-08-13 RX ADMIN — OXYCODONE HYDROCHLORIDE AND ACETAMINOPHEN SCH MG: 500 TABLET ORAL at 09:28

## 2022-08-13 RX ADMIN — GABAPENTIN SCH MG: 100 CAPSULE ORAL at 22:00

## 2022-08-13 RX ADMIN — Medication SCH ML: at 12:11

## 2022-08-13 RX ADMIN — Medication SCH ML: at 17:14

## 2022-08-13 RX ADMIN — OXYCODONE HYDROCHLORIDE AND ACETAMINOPHEN SCH MG: 500 TABLET ORAL at 22:21

## 2022-08-13 RX ADMIN — ATORVASTATIN CALCIUM SCH MG: 10 TABLET, FILM COATED ORAL at 22:20

## 2022-08-13 RX ADMIN — Medication SCH ML: at 09:27

## 2022-08-13 RX ADMIN — GABAPENTIN SCH MG: 100 CAPSULE ORAL at 09:27

## 2022-08-13 RX ADMIN — MEROPENEM SCH MLS/HR: 1 INJECTION, POWDER, FOR SOLUTION INTRAVENOUS at 17:14

## 2022-08-13 RX ADMIN — Medication SCH MG: at 09:28

## 2022-08-13 RX ADMIN — MEROPENEM SCH MLS/HR: 1 INJECTION, POWDER, FOR SOLUTION INTRAVENOUS at 09:28

## 2022-08-13 RX ADMIN — MEROPENEM SCH MLS/HR: 1 INJECTION, POWDER, FOR SOLUTION INTRAVENOUS at 02:00

## 2022-08-14 RX ADMIN — OXYCODONE HYDROCHLORIDE AND ACETAMINOPHEN SCH MG: 500 TABLET ORAL at 21:14

## 2022-08-14 RX ADMIN — Medication SCH ML: at 16:34

## 2022-08-14 RX ADMIN — OXYCODONE HYDROCHLORIDE AND ACETAMINOPHEN SCH MG: 500 TABLET ORAL at 09:50

## 2022-08-14 RX ADMIN — Medication SCH ML: at 07:54

## 2022-08-14 RX ADMIN — Medication SCH MG: at 09:50

## 2022-08-14 RX ADMIN — MEROPENEM SCH MLS/HR: 1 INJECTION, POWDER, FOR SOLUTION INTRAVENOUS at 09:47

## 2022-08-14 RX ADMIN — ATORVASTATIN CALCIUM SCH MG: 10 TABLET, FILM COATED ORAL at 21:14

## 2022-08-14 RX ADMIN — PANTOPRAZOLE SODIUM SCH MG: 20 TABLET, DELAYED RELEASE ORAL at 21:14

## 2022-08-14 RX ADMIN — MEROPENEM SCH MLS/HR: 1 INJECTION, POWDER, FOR SOLUTION INTRAVENOUS at 17:01

## 2022-08-14 RX ADMIN — MEROPENEM SCH MLS/HR: 1 INJECTION, POWDER, FOR SOLUTION INTRAVENOUS at 02:00

## 2022-08-14 RX ADMIN — MULTIVITAMIN TABLET SCH TAB: TABLET at 09:50

## 2022-08-14 RX ADMIN — GABAPENTIN SCH MG: 100 CAPSULE ORAL at 09:50

## 2022-08-14 RX ADMIN — GABAPENTIN SCH MG: 100 CAPSULE ORAL at 21:14

## 2022-08-14 RX ADMIN — ACETAMINOPHEN PRN MG: 325 TABLET ORAL at 12:37

## 2022-08-14 RX ADMIN — PANTOPRAZOLE SODIUM SCH MG: 20 TABLET, DELAYED RELEASE ORAL at 09:50

## 2022-08-14 RX ADMIN — Medication SCH ML: at 12:03

## 2022-08-14 RX ADMIN — LATANOPROST SCH DROP: 50 SOLUTION OPHTHALMIC at 21:41

## 2022-08-15 PROCEDURE — 2W1PX6Z COMPRESSION OF LEFT UPPER LEG USING PRESSURE DRESSING: ICD-10-PCS | Performed by: SURGERY

## 2022-08-15 RX ADMIN — MEROPENEM SCH MLS/HR: 1 INJECTION, POWDER, FOR SOLUTION INTRAVENOUS at 03:53

## 2022-08-15 RX ADMIN — GABAPENTIN SCH MG: 100 CAPSULE ORAL at 21:50

## 2022-08-15 RX ADMIN — OXYCODONE HYDROCHLORIDE AND ACETAMINOPHEN SCH MG: 500 TABLET ORAL at 21:50

## 2022-08-15 RX ADMIN — PANTOPRAZOLE SODIUM SCH MG: 20 TABLET, DELAYED RELEASE ORAL at 21:50

## 2022-08-15 RX ADMIN — Medication SCH ML: at 17:58

## 2022-08-15 RX ADMIN — Medication SCH MG: at 09:40

## 2022-08-15 RX ADMIN — GABAPENTIN SCH MG: 100 CAPSULE ORAL at 09:40

## 2022-08-15 RX ADMIN — MEROPENEM SCH MLS/HR: 1 INJECTION, POWDER, FOR SOLUTION INTRAVENOUS at 17:58

## 2022-08-15 RX ADMIN — PANTOPRAZOLE SODIUM SCH MG: 20 TABLET, DELAYED RELEASE ORAL at 09:40

## 2022-08-15 RX ADMIN — Medication SCH ML: at 12:48

## 2022-08-15 RX ADMIN — LATANOPROST SCH DROP: 50 SOLUTION OPHTHALMIC at 21:47

## 2022-08-15 RX ADMIN — SENNOSIDES PRN TAB: 8.6 TABLET, FILM COATED ORAL at 21:50

## 2022-08-15 RX ADMIN — ATORVASTATIN CALCIUM SCH MG: 10 TABLET, FILM COATED ORAL at 21:50

## 2022-08-15 RX ADMIN — Medication SCH ML: at 09:43

## 2022-08-15 RX ADMIN — MULTIVITAMIN TABLET SCH TAB: TABLET at 09:40

## 2022-08-15 RX ADMIN — OXYCODONE HYDROCHLORIDE AND ACETAMINOPHEN SCH MG: 500 TABLET ORAL at 09:40

## 2022-08-15 RX ADMIN — MEROPENEM SCH MLS/HR: 1 INJECTION, POWDER, FOR SOLUTION INTRAVENOUS at 09:40

## 2022-08-16 LAB
ALBUMIN SERPL-MCNC: 2.2 G/DL (ref 3.4–5)
ALP SERPL-CCNC: 88 U/L (ref 45–117)
ALT SERPL-CCNC: 7 U/L (ref 13–61)
ANION GAP SERPL CALC-SCNC: 5 MMOL/L (ref 8–16)
AST SERPL-CCNC: 24 U/L (ref 15–37)
BILIRUB SERPL-MCNC: 0.5 MG/DL (ref 0.2–1)
BUN SERPL-MCNC: 6.6 MG/DL (ref 7–18)
CALCIUM SERPL-MCNC: 8.1 MG/DL (ref 8.5–10.1)
CHLORIDE SERPL-SCNC: 101 MMOL/L (ref 98–107)
CO2 SERPL-SCNC: 33 MMOL/L (ref 21–32)
CREAT SERPL-MCNC: 0.2 MG/DL (ref 0.55–1.3)
GLUCOSE SERPL-MCNC: 78 MG/DL (ref 74–106)
PROT SERPL-MCNC: 7.3 G/DL (ref 6.4–8.2)
SODIUM SERPL-SCNC: 139 MMOL/L (ref 136–145)

## 2022-08-16 RX ADMIN — LATANOPROST SCH DROP: 50 SOLUTION OPHTHALMIC at 21:18

## 2022-08-16 RX ADMIN — ATORVASTATIN CALCIUM SCH MG: 10 TABLET, FILM COATED ORAL at 21:17

## 2022-08-16 RX ADMIN — OXYCODONE HYDROCHLORIDE AND ACETAMINOPHEN SCH MG: 500 TABLET ORAL at 21:17

## 2022-08-16 RX ADMIN — MEROPENEM SCH MLS/HR: 1 INJECTION, POWDER, FOR SOLUTION INTRAVENOUS at 01:12

## 2022-08-16 RX ADMIN — SENNOSIDES PRN TAB: 8.6 TABLET, FILM COATED ORAL at 21:16

## 2022-08-16 RX ADMIN — Medication SCH ML: at 14:29

## 2022-08-16 RX ADMIN — GABAPENTIN SCH MG: 100 CAPSULE ORAL at 09:35

## 2022-08-16 RX ADMIN — Medication SCH ML: at 09:36

## 2022-08-16 RX ADMIN — MEROPENEM SCH MLS/HR: 1 INJECTION, POWDER, FOR SOLUTION INTRAVENOUS at 17:01

## 2022-08-16 RX ADMIN — OXYCODONE HYDROCHLORIDE AND ACETAMINOPHEN SCH MG: 500 TABLET ORAL at 09:35

## 2022-08-16 RX ADMIN — MEROPENEM SCH MLS/HR: 1 INJECTION, POWDER, FOR SOLUTION INTRAVENOUS at 09:35

## 2022-08-16 RX ADMIN — Medication SCH ML: at 17:01

## 2022-08-16 RX ADMIN — MULTIVITAMIN TABLET SCH TAB: TABLET at 09:35

## 2022-08-16 RX ADMIN — PANTOPRAZOLE SODIUM SCH MG: 20 TABLET, DELAYED RELEASE ORAL at 09:35

## 2022-08-16 RX ADMIN — PANTOPRAZOLE SODIUM SCH MG: 20 TABLET, DELAYED RELEASE ORAL at 21:16

## 2022-08-16 RX ADMIN — GABAPENTIN SCH MG: 100 CAPSULE ORAL at 21:16

## 2022-08-16 RX ADMIN — Medication SCH MG: at 09:35

## 2022-08-17 RX ADMIN — GABAPENTIN SCH MG: 100 CAPSULE ORAL at 21:54

## 2022-08-17 RX ADMIN — MEROPENEM SCH MLS/HR: 1 INJECTION, POWDER, FOR SOLUTION INTRAVENOUS at 09:31

## 2022-08-17 RX ADMIN — VANCOMYCIN SCH MLS/HR: 1 INJECTION, SOLUTION INTRAVENOUS at 12:20

## 2022-08-17 RX ADMIN — Medication SCH ML: at 17:14

## 2022-08-17 RX ADMIN — PANTOPRAZOLE SODIUM SCH MG: 20 TABLET, DELAYED RELEASE ORAL at 09:31

## 2022-08-17 RX ADMIN — Medication SCH ML: at 07:53

## 2022-08-17 RX ADMIN — LATANOPROST SCH DROP: 50 SOLUTION OPHTHALMIC at 22:12

## 2022-08-17 RX ADMIN — OXYCODONE HYDROCHLORIDE AND ACETAMINOPHEN SCH MG: 500 TABLET ORAL at 21:54

## 2022-08-17 RX ADMIN — GABAPENTIN SCH MG: 100 CAPSULE ORAL at 09:31

## 2022-08-17 RX ADMIN — OXYCODONE HYDROCHLORIDE AND ACETAMINOPHEN SCH MG: 500 TABLET ORAL at 09:31

## 2022-08-17 RX ADMIN — ERTAPENEM SODIUM SCH MLS/HR: 1 INJECTION, POWDER, LYOPHILIZED, FOR SOLUTION INTRAMUSCULAR; INTRAVENOUS at 13:53

## 2022-08-17 RX ADMIN — PANTOPRAZOLE SODIUM SCH MG: 20 TABLET, DELAYED RELEASE ORAL at 21:54

## 2022-08-17 RX ADMIN — ATORVASTATIN CALCIUM SCH MG: 10 TABLET, FILM COATED ORAL at 21:54

## 2022-08-17 RX ADMIN — MEROPENEM SCH MLS/HR: 1 INJECTION, POWDER, FOR SOLUTION INTRAVENOUS at 02:00

## 2022-08-17 RX ADMIN — Medication SCH: at 11:38

## 2022-08-17 RX ADMIN — MULTIVITAMIN TABLET SCH TAB: TABLET at 09:31

## 2022-08-17 RX ADMIN — Medication SCH MG: at 09:31

## 2022-08-18 VITALS — DIASTOLIC BLOOD PRESSURE: 66 MMHG | SYSTOLIC BLOOD PRESSURE: 102 MMHG | HEART RATE: 74 BPM | TEMPERATURE: 98.3 F

## 2022-08-18 VITALS — RESPIRATION RATE: 18 BRPM

## 2022-08-18 PROCEDURE — 02HV33Z INSERTION OF INFUSION DEVICE INTO SUPERIOR VENA CAVA, PERCUTANEOUS APPROACH: ICD-10-PCS | Performed by: RADIOLOGY

## 2022-08-18 PROCEDURE — B518ZZA FLUOROSCOPY OF SUPERIOR VENA CAVA, GUIDANCE: ICD-10-PCS | Performed by: RADIOLOGY

## 2022-08-18 RX ADMIN — Medication SCH ML: at 12:04

## 2022-08-18 RX ADMIN — Medication SCH: at 16:58

## 2022-08-18 RX ADMIN — OXYCODONE HYDROCHLORIDE AND ACETAMINOPHEN SCH MG: 500 TABLET ORAL at 09:49

## 2022-08-18 RX ADMIN — VANCOMYCIN SCH MLS/HR: 1 INJECTION, SOLUTION INTRAVENOUS at 12:04

## 2022-08-18 RX ADMIN — GABAPENTIN SCH MG: 100 CAPSULE ORAL at 09:49

## 2022-08-18 RX ADMIN — VANCOMYCIN SCH MLS/HR: 1 INJECTION, SOLUTION INTRAVENOUS at 00:48

## 2022-08-18 RX ADMIN — ERTAPENEM SODIUM SCH MLS/HR: 1 INJECTION, POWDER, LYOPHILIZED, FOR SOLUTION INTRAMUSCULAR; INTRAVENOUS at 10:28

## 2022-08-18 RX ADMIN — Medication SCH MG: at 09:49

## 2022-08-18 RX ADMIN — Medication SCH ML: at 08:08

## 2022-08-18 RX ADMIN — MULTIVITAMIN TABLET SCH TAB: TABLET at 09:49

## 2022-08-18 RX ADMIN — PANTOPRAZOLE SODIUM SCH MG: 20 TABLET, DELAYED RELEASE ORAL at 09:49

## 2022-09-01 ENCOUNTER — HOSPITAL ENCOUNTER (INPATIENT)
Dept: HOSPITAL 74 - JER | Age: 47
LOS: 14 days | DRG: 710 | End: 2022-09-15
Attending: INTERNAL MEDICINE | Admitting: INTERNAL MEDICINE
Payer: COMMERCIAL

## 2022-09-01 VITALS — BODY MASS INDEX: 37 KG/M2

## 2022-09-01 DIAGNOSIS — H40.9: ICD-10-CM

## 2022-09-01 DIAGNOSIS — J90: ICD-10-CM

## 2022-09-01 DIAGNOSIS — I11.0: ICD-10-CM

## 2022-09-01 DIAGNOSIS — G82.50: ICD-10-CM

## 2022-09-01 DIAGNOSIS — E87.2: ICD-10-CM

## 2022-09-01 DIAGNOSIS — R65.21: ICD-10-CM

## 2022-09-01 DIAGNOSIS — J96.01: ICD-10-CM

## 2022-09-01 DIAGNOSIS — R18.8: ICD-10-CM

## 2022-09-01 DIAGNOSIS — D72.829: ICD-10-CM

## 2022-09-01 DIAGNOSIS — E87.1: ICD-10-CM

## 2022-09-01 DIAGNOSIS — J98.11: ICD-10-CM

## 2022-09-01 DIAGNOSIS — J15.8: ICD-10-CM

## 2022-09-01 DIAGNOSIS — L89.150: ICD-10-CM

## 2022-09-01 DIAGNOSIS — E66.01: ICD-10-CM

## 2022-09-01 DIAGNOSIS — L89.893: ICD-10-CM

## 2022-09-01 DIAGNOSIS — N17.9: ICD-10-CM

## 2022-09-01 DIAGNOSIS — N13.6: ICD-10-CM

## 2022-09-01 DIAGNOSIS — I46.9: ICD-10-CM

## 2022-09-01 DIAGNOSIS — L02.415: ICD-10-CM

## 2022-09-01 DIAGNOSIS — J96.02: ICD-10-CM

## 2022-09-01 DIAGNOSIS — I24.8: ICD-10-CM

## 2022-09-01 DIAGNOSIS — M62.838: ICD-10-CM

## 2022-09-01 DIAGNOSIS — I50.31: ICD-10-CM

## 2022-09-01 DIAGNOSIS — L02.31: ICD-10-CM

## 2022-09-01 DIAGNOSIS — E88.09: ICD-10-CM

## 2022-09-01 DIAGNOSIS — A41.89: Primary | ICD-10-CM

## 2022-09-01 DIAGNOSIS — M81.0: ICD-10-CM

## 2022-09-01 DIAGNOSIS — N28.1: ICD-10-CM

## 2022-09-01 LAB
ALBUMIN SERPL-MCNC: 2.3 G/DL (ref 3.4–5)
ALP SERPL-CCNC: 102 U/L (ref 45–117)
ALT SERPL-CCNC: 8 U/L (ref 13–61)
ANION GAP SERPL CALC-SCNC: 3 MMOL/L (ref 8–16)
APPEARANCE UR: (no result)
APTT BLD: 34.5 SECONDS (ref 25.2–36.5)
ARTERIAL PATENCY WRIST A: POSITIVE
AST SERPL-CCNC: 20 U/L (ref 15–37)
BACTERIA # UR AUTO: 21 /UL (ref 0–1359)
BASE EXCESS BLDA CALC-SCNC: 4.5 MMOL/L (ref -2–2)
BASE EXCESS BLDV CALC-SCNC: -2.4 MMOL/L (ref -2–2)
BASE EXCESS BLDV CALC-SCNC: 1.2 MMOL/L (ref -2–2)
BASOPHILS # BLD: 0 % (ref 0–2)
BILIRUB SERPL-MCNC: 0.5 MG/DL (ref 0.2–1)
BILIRUB UR STRIP.AUTO-MCNC: NEGATIVE MG/DL
BNP SERPL-MCNC: 1679.2 PG/ML (ref 5–125)
BREATHS.MECHANICAL ON VENT: 18
BUN SERPL-MCNC: 6.3 MG/DL (ref 7–18)
CALCIUM SERPL-MCNC: 8.1 MG/DL (ref 8.5–10.1)
CASTS URNS QL MICRO: 9 /UL (ref 0–3.1)
CHLORIDE SERPL-SCNC: 97 MMOL/L (ref 98–107)
CO2 SERPL-SCNC: 36 MMOL/L (ref 21–32)
COLOR UR: (no result)
CREAT SERPL-MCNC: 0.5 MG/DL (ref 0.55–1.3)
DEPRECATED RDW RBC AUTO: 17.9 % (ref 11.9–15.9)
EOSINOPHIL # BLD: 0.2 % (ref 0–4.5)
EPITH CASTS URNS QL MICRO: >36 /UL (ref 0–25.1)
GLUCOSE SERPL-MCNC: 109 MG/DL (ref 74–106)
HCT VFR BLD CALC: 39.4 % (ref 35.4–49)
HCT VFR BLDV CALC: 36 % (ref 35.4–49)
HCT VFR BLDV CALC: 38 % (ref 35.4–49)
HCT VFR BLDV CALC: 52 % (ref 35.4–49)
HGB BLD-MCNC: 12.3 GM/DL (ref 11.7–16.9)
INR BLD: 1.12 (ref 0.83–1.09)
KETONES UR QL STRIP: NEGATIVE
LACTATE SERPL-MCNC: 2.4 MMOL/L (ref 0.4–2)
LEUKOCYTE ESTERASE UR QL STRIP.AUTO: (no result)
LYMPHOCYTES # BLD: 5.8 % (ref 8–40)
MCH RBC QN AUTO: 25.6 PG (ref 25.7–33.7)
MCHC RBC AUTO-ENTMCNC: 31.3 G/DL (ref 32–35.9)
MCV RBC: 82 FL (ref 80–96)
MONOCYTES # BLD AUTO: 6.8 % (ref 3.8–10.2)
NEUTROPHILS # BLD: 87.2 % (ref 42.8–82.8)
NITRITE UR QL STRIP: NEGATIVE
PCO2 BLDA: 44 MMHG (ref 35–45)
PCO2 BLDV: 83.4 MMHG (ref 38–52)
PCO2 BLDV: 95.3 MMHG (ref 38–52)
PH BLDV: 7.11 [PH] (ref 7.31–7.41)
PH BLDV: 7.21 [PH] (ref 7.31–7.41)
PH UR: 6 [PH] (ref 5–8)
PLATELET # BLD AUTO: 321 10^3/UL (ref 134–434)
PMV BLD: 6.3 FL (ref 7.5–11.1)
PO2 BLDA: 70.5 MMHG (ref 80–100)
PROT SERPL-MCNC: 8 G/DL (ref 6.4–8.2)
PROT UR QL STRIP: (no result)
PROT UR QL STRIP: NEGATIVE
PT PNL PPP: 12.9 SEC (ref 9.7–13)
RBC # BLD AUTO: 1304 /UL (ref 0–23.9)
RBC # BLD AUTO: 4.81 M/MM3 (ref 4–5.6)
SAO2 % BLDA: 94.7 % (ref 95–98)
SAO2 % BLDV: 20.1 % (ref 70–80)
SAO2 % BLDV: 62.7 % (ref 70–80)
SODIUM SERPL-SCNC: 136 MMOL/L (ref 136–145)
SP GR UR: 1.02 (ref 1.01–1.03)
UROBILINOGEN UR STRIP-MCNC: 2 MG/DL (ref 0.2–1)
VENTILATION MODE VENT: (no result)
WBC # BLD AUTO: 7.4 K/MM3 (ref 4–10)
WBC # UR AUTO: 106 /UL (ref 0–25.8)

## 2022-09-01 PROCEDURE — 5A1955Z RESPIRATORY VENTILATION, GREATER THAN 96 CONSECUTIVE HOURS: ICD-10-PCS | Performed by: EMERGENCY MEDICINE

## 2022-09-01 PROCEDURE — U0003 INFECTIOUS AGENT DETECTION BY NUCLEIC ACID (DNA OR RNA); SEVERE ACUTE RESPIRATORY SYNDROME CORONAVIRUS 2 (SARS-COV-2) (CORONAVIRUS DISEASE [COVID-19]), AMPLIFIED PROBE TECHNIQUE, MAKING USE OF HIGH THROUGHPUT TECHNOLOGIES AS DESCRIBED BY CMS-2020-01-R: HCPCS

## 2022-09-01 PROCEDURE — G0480 DRUG TEST DEF 1-7 CLASSES: HCPCS

## 2022-09-01 PROCEDURE — U0005 INFEC AGEN DETEC AMPLI PROBE: HCPCS

## 2022-09-01 RX ADMIN — Medication SCH MLS/HR: at 10:05

## 2022-09-01 RX ADMIN — VANCOMYCIN HYDROCHLORIDE SCH MLS/HR: 1.5 INJECTION, POWDER, LYOPHILIZED, FOR SOLUTION INTRAVENOUS at 21:15

## 2022-09-01 RX ADMIN — ENOXAPARIN SODIUM SCH MG: 40 INJECTION SUBCUTANEOUS at 11:36

## 2022-09-01 RX ADMIN — INSULIN ASPART SCH: 100 INJECTION, SOLUTION INTRAVENOUS; SUBCUTANEOUS at 21:28

## 2022-09-01 RX ADMIN — VASOPRESSIN SCH MLS/HR: 20 INJECTION INTRAVENOUS at 14:30

## 2022-09-01 RX ADMIN — CHLORHEXIDINE GLUCONATE SCH APPLIC: 213 SOLUTION TOPICAL at 21:17

## 2022-09-01 RX ADMIN — AZTREONAM SCH MLS/HR: 1 INJECTION, POWDER, LYOPHILIZED, FOR SOLUTION INTRAMUSCULAR; INTRAVENOUS at 18:22

## 2022-09-01 RX ADMIN — MEROPENEM SCH MLS/HR: 1 INJECTION, POWDER, FOR SOLUTION INTRAVENOUS at 18:21

## 2022-09-01 RX ADMIN — MUPIROCIN SCH APPLIC: 20 OINTMENT TOPICAL at 21:16

## 2022-09-01 RX ADMIN — POLYETHYLENE GLYCOL 3350 SCH: 17 POWDER, FOR SOLUTION ORAL at 13:09

## 2022-09-01 RX ADMIN — FENTANYL CITRATE SCH MLS/HR: 0.05 INJECTION, SOLUTION INTRAMUSCULAR; INTRAVENOUS at 18:25

## 2022-09-01 RX ADMIN — SENNOSIDES SCH: 8.6 TABLET, FILM COATED ORAL at 21:16

## 2022-09-01 RX ADMIN — INSULIN ASPART SCH: 100 INJECTION, SOLUTION INTRAVENOUS; SUBCUTANEOUS at 18:35

## 2022-09-02 LAB
ALBUMIN SERPL-MCNC: 1.9 G/DL (ref 3.4–5)
ALP SERPL-CCNC: 80 U/L (ref 45–117)
ALT SERPL-CCNC: 7 U/L (ref 13–61)
ANION GAP SERPL CALC-SCNC: 7 MMOL/L (ref 8–16)
AST SERPL-CCNC: 21 U/L (ref 15–37)
BASOPHILS # BLD: 0.2 % (ref 0–2)
BILIRUB DIRECT SERPL-MCNC: 150 U/L (ref 87–246)
BILIRUB SERPL-MCNC: 0.5 MG/DL (ref 0.2–1)
BUN SERPL-MCNC: 7.6 MG/DL (ref 7–18)
CALCIUM SERPL-MCNC: 7.8 MG/DL (ref 8.5–10.1)
CHLORIDE SERPL-SCNC: 99 MMOL/L (ref 98–107)
CHOLEST SERPL-MCNC: 98 MG/DL (ref 50–200)
CO2 SERPL-SCNC: 31 MMOL/L (ref 21–32)
CREAT SERPL-MCNC: 0.2 MG/DL (ref 0.55–1.3)
DEPRECATED RDW RBC AUTO: 17 % (ref 11.9–15.9)
EOSINOPHIL # BLD: 0.7 % (ref 0–4.5)
GLUCOSE SERPL-MCNC: 80 MG/DL (ref 74–106)
HCT VFR BLD CALC: 34 % (ref 35.4–49)
HDLC SERPL-MCNC: 25 MG/DL (ref 40–60)
HGB BLD-MCNC: 10.9 GM/DL (ref 11.7–16.9)
LDLC SERPL CALC-MCNC: 57 MG/DL (ref 5–100)
LYMPHOCYTES # BLD: 22.4 % (ref 8–40)
MAGNESIUM SERPL-MCNC: 1.9 MG/DL (ref 1.8–2.4)
MCH RBC QN AUTO: 25.3 PG (ref 25.7–33.7)
MCHC RBC AUTO-ENTMCNC: 32 G/DL (ref 32–35.9)
MCV RBC: 79.2 FL (ref 80–96)
MONOCYTES # BLD AUTO: 7.5 % (ref 3.8–10.2)
NEUTROPHILS # BLD: 69.2 % (ref 42.8–82.8)
PHOSPHATE SERPL-MCNC: 2.9 MG/DL (ref 2.5–4.9)
PLATELET # BLD AUTO: 296 10^3/UL (ref 134–434)
PMV BLD: 7 FL (ref 7.5–11.1)
PROT SERPL-MCNC: 6.6 G/DL (ref 6.4–8.2)
RBC # BLD AUTO: 4.29 M/MM3 (ref 4–5.6)
SODIUM SERPL-SCNC: 137 MMOL/L (ref 136–145)
TRIGL SERPL-MCNC: 152 MG/DL (ref 0–150)
WBC # BLD AUTO: 7.6 K/MM3 (ref 4–10)

## 2022-09-02 PROCEDURE — 0DH67UZ INSERTION OF FEEDING DEVICE INTO STOMACH, VIA NATURAL OR ARTIFICIAL OPENING: ICD-10-PCS | Performed by: INTERNAL MEDICINE

## 2022-09-02 PROCEDURE — 0W9930Z DRAINAGE OF RIGHT PLEURAL CAVITY WITH DRAINAGE DEVICE, PERCUTANEOUS APPROACH: ICD-10-PCS | Performed by: INTERNAL MEDICINE

## 2022-09-02 RX ADMIN — INSULIN ASPART SCH: 100 INJECTION, SOLUTION INTRAVENOUS; SUBCUTANEOUS at 06:55

## 2022-09-02 RX ADMIN — SENNOSIDES SCH: 8.6 TABLET, FILM COATED ORAL at 09:14

## 2022-09-02 RX ADMIN — SENNOSIDES SCH TAB: 8.6 TABLET, FILM COATED ORAL at 22:16

## 2022-09-02 RX ADMIN — AZTREONAM SCH MLS/HR: 1 INJECTION, POWDER, LYOPHILIZED, FOR SOLUTION INTRAMUSCULAR; INTRAVENOUS at 09:12

## 2022-09-02 RX ADMIN — AZTREONAM SCH MLS/HR: 1 INJECTION, POWDER, LYOPHILIZED, FOR SOLUTION INTRAMUSCULAR; INTRAVENOUS at 01:13

## 2022-09-02 RX ADMIN — MUPIROCIN SCH APPLIC: 20 OINTMENT TOPICAL at 17:47

## 2022-09-02 RX ADMIN — ENOXAPARIN SODIUM SCH MG: 40 INJECTION SUBCUTANEOUS at 09:13

## 2022-09-02 RX ADMIN — Medication SCH MLS/HR: at 04:02

## 2022-09-02 RX ADMIN — INSULIN ASPART SCH: 100 INJECTION, SOLUTION INTRAVENOUS; SUBCUTANEOUS at 22:12

## 2022-09-02 RX ADMIN — MUPIROCIN SCH APPLIC: 20 OINTMENT TOPICAL at 22:16

## 2022-09-02 RX ADMIN — Medication SCH MLS/HR: at 15:00

## 2022-09-02 RX ADMIN — VANCOMYCIN SCH MLS/HR: 1.5 INJECTION, SOLUTION INTRAVENOUS at 22:59

## 2022-09-02 RX ADMIN — VASOPRESSIN SCH MLS/HR: 20 INJECTION INTRAVENOUS at 17:48

## 2022-09-02 RX ADMIN — MEROPENEM SCH MLS/HR: 1 INJECTION, POWDER, FOR SOLUTION INTRAVENOUS at 02:36

## 2022-09-02 RX ADMIN — VASOPRESSIN SCH MLS/HR: 20 INJECTION INTRAVENOUS at 22:15

## 2022-09-02 RX ADMIN — DEXTROSE MONOHYDRATE SCH MLS/HR: 50 INJECTION, SOLUTION INTRAVENOUS at 22:15

## 2022-09-02 RX ADMIN — INSULIN ASPART SCH: 100 INJECTION, SOLUTION INTRAVENOUS; SUBCUTANEOUS at 11:23

## 2022-09-02 RX ADMIN — INSULIN ASPART SCH: 100 INJECTION, SOLUTION INTRAVENOUS; SUBCUTANEOUS at 18:08

## 2022-09-02 RX ADMIN — MEROPENEM SCH MLS/HR: 1 INJECTION, POWDER, FOR SOLUTION INTRAVENOUS at 18:08

## 2022-09-02 RX ADMIN — VANCOMYCIN HYDROCHLORIDE SCH MLS/HR: 1.5 INJECTION, POWDER, LYOPHILIZED, FOR SOLUTION INTRAVENOUS at 11:07

## 2022-09-02 RX ADMIN — FENTANYL CITRATE SCH MLS/HR: 0.05 INJECTION, SOLUTION INTRAMUSCULAR; INTRAVENOUS at 17:49

## 2022-09-02 RX ADMIN — CHLORHEXIDINE GLUCONATE SCH APPLIC: 213 SOLUTION TOPICAL at 22:16

## 2022-09-02 RX ADMIN — MEROPENEM SCH MLS/HR: 1 INJECTION, POWDER, FOR SOLUTION INTRAVENOUS at 09:13

## 2022-09-02 RX ADMIN — POLYETHYLENE GLYCOL 3350 SCH: 17 POWDER, FOR SOLUTION ORAL at 09:13

## 2022-09-03 LAB
ALBUMIN SERPL-MCNC: 1.8 G/DL (ref 3.4–5)
ALP SERPL-CCNC: 82 U/L (ref 45–117)
ALT SERPL-CCNC: 7 U/L (ref 13–61)
ANION GAP SERPL CALC-SCNC: 9 MMOL/L (ref 8–16)
AST SERPL-CCNC: 20 U/L (ref 15–37)
BILIRUB SERPL-MCNC: 0.8 MG/DL (ref 0.2–1)
BUN SERPL-MCNC: 6.6 MG/DL (ref 7–18)
CALCIUM SERPL-MCNC: 7.7 MG/DL (ref 8.5–10.1)
CHLORIDE SERPL-SCNC: 100 MMOL/L (ref 98–107)
CO2 SERPL-SCNC: 28 MMOL/L (ref 21–32)
CREAT SERPL-MCNC: 0.2 MG/DL (ref 0.55–1.3)
DEPRECATED RDW RBC AUTO: 17 % (ref 11.9–15.9)
GLUCOSE SERPL-MCNC: 66 MG/DL (ref 74–106)
HCT VFR BLD CALC: 32.1 % (ref 35.4–49)
HGB BLD-MCNC: 10.7 GM/DL (ref 11.7–16.9)
MAGNESIUM SERPL-MCNC: 1.7 MG/DL (ref 1.8–2.4)
MCH RBC QN AUTO: 26.3 PG (ref 25.7–33.7)
MCHC RBC AUTO-ENTMCNC: 33.3 G/DL (ref 32–35.9)
MCV RBC: 79 FL (ref 80–96)
MESOTHL CELL NFR PLR MANUAL: 6 %
MONOCYTES NFR PLR MANUAL: 8 %
NUC CELL # FLD: (no result) /MM3
PHOSPHATE SERPL-MCNC: 3.2 MG/DL (ref 2.5–4.9)
PLATELET # BLD AUTO: 254 10^3/UL (ref 134–434)
PMV BLD: 6.5 FL (ref 7.5–11.1)
PROT SERPL-MCNC: 6.3 G/DL (ref 6.4–8.2)
RBC # BLD AUTO: 4.06 M/MM3 (ref 4–5.6)
SODIUM SERPL-SCNC: 137 MMOL/L (ref 136–145)
WBC # BLD AUTO: 7.5 K/MM3 (ref 4–10)

## 2022-09-03 RX ADMIN — MEROPENEM SCH MLS/HR: 1 INJECTION, POWDER, FOR SOLUTION INTRAVENOUS at 09:14

## 2022-09-03 RX ADMIN — INSULIN ASPART SCH: 100 INJECTION, SOLUTION INTRAVENOUS; SUBCUTANEOUS at 11:30

## 2022-09-03 RX ADMIN — VANCOMYCIN SCH MLS/HR: 1.5 INJECTION, SOLUTION INTRAVENOUS at 09:15

## 2022-09-03 RX ADMIN — MUPIROCIN SCH APPLIC: 20 OINTMENT TOPICAL at 21:32

## 2022-09-03 RX ADMIN — DEXTROSE MONOHYDRATE SCH: 50 INJECTION, SOLUTION INTRAVENOUS at 22:13

## 2022-09-03 RX ADMIN — INSULIN ASPART SCH: 100 INJECTION, SOLUTION INTRAVENOUS; SUBCUTANEOUS at 18:14

## 2022-09-03 RX ADMIN — Medication SCH MLS/HR: at 18:09

## 2022-09-03 RX ADMIN — MEROPENEM SCH MLS/HR: 1 INJECTION, POWDER, FOR SOLUTION INTRAVENOUS at 01:53

## 2022-09-03 RX ADMIN — INSULIN ASPART SCH: 100 INJECTION, SOLUTION INTRAVENOUS; SUBCUTANEOUS at 21:33

## 2022-09-03 RX ADMIN — VANCOMYCIN SCH MLS/HR: 1.5 INJECTION, SOLUTION INTRAVENOUS at 21:33

## 2022-09-03 RX ADMIN — SENNOSIDES SCH TAB: 8.6 TABLET, FILM COATED ORAL at 23:26

## 2022-09-03 RX ADMIN — SENNOSIDES SCH: 8.6 TABLET, FILM COATED ORAL at 09:15

## 2022-09-03 RX ADMIN — CHLORHEXIDINE GLUCONATE SCH APPLIC: 213 SOLUTION TOPICAL at 21:33

## 2022-09-03 RX ADMIN — PANTOPRAZOLE SODIUM SCH MG: 40 INJECTION, POWDER, FOR SOLUTION INTRAVENOUS at 09:15

## 2022-09-03 RX ADMIN — FENTANYL CITRATE SCH MLS/HR: 0.05 INJECTION, SOLUTION INTRAMUSCULAR; INTRAVENOUS at 18:10

## 2022-09-03 RX ADMIN — SENNOSIDES SCH: 8.6 TABLET, FILM COATED ORAL at 21:33

## 2022-09-03 RX ADMIN — INSULIN ASPART SCH: 100 INJECTION, SOLUTION INTRAVENOUS; SUBCUTANEOUS at 06:39

## 2022-09-03 RX ADMIN — POLYETHYLENE GLYCOL 3350 SCH: 17 POWDER, FOR SOLUTION ORAL at 09:14

## 2022-09-03 RX ADMIN — MUPIROCIN SCH: 20 OINTMENT TOPICAL at 21:32

## 2022-09-03 RX ADMIN — FENTANYL CITRATE SCH MLS/HR: 0.05 INJECTION, SOLUTION INTRAMUSCULAR; INTRAVENOUS at 08:46

## 2022-09-03 RX ADMIN — MEROPENEM SCH MLS/HR: 1 INJECTION, POWDER, FOR SOLUTION INTRAVENOUS at 17:38

## 2022-09-03 RX ADMIN — ENOXAPARIN SODIUM SCH MG: 40 INJECTION SUBCUTANEOUS at 09:14

## 2022-09-03 RX ADMIN — FENTANYL CITRATE SCH MLS/HR: 0.05 INJECTION, SOLUTION INTRAMUSCULAR; INTRAVENOUS at 23:40

## 2022-09-03 RX ADMIN — MUPIROCIN SCH APPLIC: 20 OINTMENT TOPICAL at 21:33

## 2022-09-04 LAB
ALBUMIN SERPL-MCNC: 1.7 G/DL (ref 3.4–5)
ALP SERPL-CCNC: 76 U/L (ref 45–117)
ALT SERPL-CCNC: < 6 U/L (ref 13–61)
ANION GAP SERPL CALC-SCNC: 11 MMOL/L (ref 8–16)
ANION GAP SERPL CALC-SCNC: 11 MMOL/L (ref 8–16)
AST SERPL-CCNC: 16 U/L (ref 15–37)
BASOPHILS # BLD: 0.2 % (ref 0–2)
BILIRUB SERPL-MCNC: 1.1 MG/DL (ref 0.2–1)
BUN SERPL-MCNC: 5.3 MG/DL (ref 7–18)
BUN SERPL-MCNC: 6.3 MG/DL (ref 7–18)
CALCIUM SERPL-MCNC: 7.4 MG/DL (ref 8.5–10.1)
CALCIUM SERPL-MCNC: 7.5 MG/DL (ref 8.5–10.1)
CHLORIDE SERPL-SCNC: 97 MMOL/L (ref 98–107)
CHLORIDE SERPL-SCNC: 98 MMOL/L (ref 98–107)
CO2 SERPL-SCNC: 25 MMOL/L (ref 21–32)
CO2 SERPL-SCNC: 28 MMOL/L (ref 21–32)
CREAT SERPL-MCNC: 0.2 MG/DL (ref 0.55–1.3)
CREAT SERPL-MCNC: < 0.2 MG/DL (ref 0.55–1.3)
DEPRECATED RDW RBC AUTO: 17.2 % (ref 11.9–15.9)
EOSINOPHIL # BLD: 3.4 % (ref 0–4.5)
GLUCOSE SERPL-MCNC: 63 MG/DL (ref 74–106)
GLUCOSE SERPL-MCNC: 70 MG/DL (ref 74–106)
HCT VFR BLD CALC: 31.6 % (ref 35.4–49)
HGB BLD-MCNC: 10.5 GM/DL (ref 11.7–16.9)
LYMPHOCYTES # BLD: 19.2 % (ref 8–40)
MAGNESIUM SERPL-MCNC: 2 MG/DL (ref 1.8–2.4)
MCH RBC QN AUTO: 26.1 PG (ref 25.7–33.7)
MCHC RBC AUTO-ENTMCNC: 33.2 G/DL (ref 32–35.9)
MCV RBC: 78.7 FL (ref 80–96)
MONOCYTES # BLD AUTO: 6.9 % (ref 3.8–10.2)
NEUTROPHILS # BLD: 70.3 % (ref 42.8–82.8)
PHOSPHATE SERPL-MCNC: 3.4 MG/DL (ref 2.5–4.9)
PLATELET # BLD AUTO: 285 10^3/UL (ref 134–434)
PMV BLD: 6.4 FL (ref 7.5–11.1)
PROT SERPL-MCNC: 6.1 G/DL (ref 6.4–8.2)
RBC # BLD AUTO: 4.01 M/MM3 (ref 4–5.6)
SODIUM SERPL-SCNC: 134 MMOL/L (ref 136–145)
SODIUM SERPL-SCNC: 135 MMOL/L (ref 136–145)
WBC # BLD AUTO: 6.7 K/MM3 (ref 4–10)

## 2022-09-04 RX ADMIN — MEROPENEM SCH MLS/HR: 1 INJECTION, POWDER, FOR SOLUTION INTRAVENOUS at 17:40

## 2022-09-04 RX ADMIN — MUPIROCIN SCH APPLIC: 20 OINTMENT TOPICAL at 09:07

## 2022-09-04 RX ADMIN — POTASSIUM CHLORIDE SCH MEQ: 200 INJECTION, SOLUTION INTRAVENOUS at 10:22

## 2022-09-04 RX ADMIN — POTASSIUM CHLORIDE SCH MEQ: 200 INJECTION, SOLUTION INTRAVENOUS at 11:25

## 2022-09-04 RX ADMIN — CHLORHEXIDINE GLUCONATE SCH APPLIC: 213 SOLUTION TOPICAL at 22:01

## 2022-09-04 RX ADMIN — POLYETHYLENE GLYCOL 3350 SCH GM: 17 POWDER, FOR SOLUTION ORAL at 09:01

## 2022-09-04 RX ADMIN — Medication SCH MLS/HR: at 08:56

## 2022-09-04 RX ADMIN — VASOPRESSIN SCH: 20 INJECTION INTRAVENOUS at 14:08

## 2022-09-04 RX ADMIN — MEROPENEM SCH MLS/HR: 1 INJECTION, POWDER, FOR SOLUTION INTRAVENOUS at 09:01

## 2022-09-04 RX ADMIN — INSULIN ASPART SCH: 100 INJECTION, SOLUTION INTRAVENOUS; SUBCUTANEOUS at 11:24

## 2022-09-04 RX ADMIN — ENOXAPARIN SODIUM SCH MG: 40 INJECTION SUBCUTANEOUS at 09:01

## 2022-09-04 RX ADMIN — INSULIN ASPART SCH: 100 INJECTION, SOLUTION INTRAVENOUS; SUBCUTANEOUS at 22:01

## 2022-09-04 RX ADMIN — POTASSIUM CHLORIDE SCH MEQ: 200 INJECTION, SOLUTION INTRAVENOUS at 23:11

## 2022-09-04 RX ADMIN — VASOPRESSIN SCH MLS/HR: 20 INJECTION INTRAVENOUS at 14:01

## 2022-09-04 RX ADMIN — Medication SCH ML: at 11:25

## 2022-09-04 RX ADMIN — MEROPENEM SCH MLS/HR: 1 INJECTION, POWDER, FOR SOLUTION INTRAVENOUS at 01:12

## 2022-09-04 RX ADMIN — POTASSIUM CHLORIDE SCH MEQ: 200 INJECTION, SOLUTION INTRAVENOUS at 20:50

## 2022-09-04 RX ADMIN — SENNOSIDES SCH TAB: 8.6 TABLET, FILM COATED ORAL at 09:02

## 2022-09-04 RX ADMIN — Medication SCH ML: at 16:46

## 2022-09-04 RX ADMIN — MUPIROCIN SCH APPLIC: 20 OINTMENT TOPICAL at 22:02

## 2022-09-04 RX ADMIN — VASOPRESSIN SCH MLS/HR: 20 INJECTION INTRAVENOUS at 08:58

## 2022-09-04 RX ADMIN — INSULIN ASPART SCH: 100 INJECTION, SOLUTION INTRAVENOUS; SUBCUTANEOUS at 16:45

## 2022-09-04 RX ADMIN — VANCOMYCIN SCH MLS/HR: 1.5 INJECTION, SOLUTION INTRAVENOUS at 10:22

## 2022-09-04 RX ADMIN — VANCOMYCIN SCH MLS/HR: 1.5 INJECTION, SOLUTION INTRAVENOUS at 22:01

## 2022-09-04 RX ADMIN — SENNOSIDES SCH TAB: 8.6 TABLET, FILM COATED ORAL at 22:01

## 2022-09-04 RX ADMIN — FENTANYL CITRATE SCH MLS/HR: 0.05 INJECTION, SOLUTION INTRAMUSCULAR; INTRAVENOUS at 13:30

## 2022-09-04 RX ADMIN — POTASSIUM CHLORIDE SCH MEQ: 200 INJECTION, SOLUTION INTRAVENOUS at 22:01

## 2022-09-04 RX ADMIN — INSULIN ASPART SCH: 100 INJECTION, SOLUTION INTRAVENOUS; SUBCUTANEOUS at 06:59

## 2022-09-04 RX ADMIN — PANTOPRAZOLE SODIUM SCH MG: 40 INJECTION, POWDER, FOR SOLUTION INTRAVENOUS at 09:02

## 2022-09-04 RX ADMIN — POTASSIUM CHLORIDE SCH MEQ: 200 INJECTION, SOLUTION INTRAVENOUS at 09:03

## 2022-09-04 RX ADMIN — FENTANYL CITRATE SCH MLS/HR: 0.05 INJECTION, SOLUTION INTRAMUSCULAR; INTRAVENOUS at 19:00

## 2022-09-05 LAB
ALBUMIN SERPL-MCNC: 1.8 G/DL (ref 3.4–5)
ALP SERPL-CCNC: 88 U/L (ref 45–117)
ALT SERPL-CCNC: < 6 U/L (ref 13–61)
ANION GAP SERPL CALC-SCNC: 8 MMOL/L (ref 8–16)
ARTERIAL PATENCY WRIST A: POSITIVE
AST SERPL-CCNC: 18 U/L (ref 15–37)
BASE EXCESS BLDA CALC-SCNC: 1 MMOL/L (ref -2–2)
BASOPHILS # BLD: 0.1 % (ref 0–2)
BILIRUB SERPL-MCNC: 1 MG/DL (ref 0.2–1)
BREATHS.MECHANICAL ON VENT: 16
BUN SERPL-MCNC: 6 MG/DL (ref 7–18)
CALCIUM SERPL-MCNC: 8 MG/DL (ref 8.5–10.1)
CHLORIDE SERPL-SCNC: 108 MMOL/L (ref 98–107)
CO2 SERPL-SCNC: 26 MMOL/L (ref 21–32)
CREAT SERPL-MCNC: 0.2 MG/DL (ref 0.55–1.3)
DEPRECATED RDW RBC AUTO: 17 % (ref 11.9–15.9)
EOSINOPHIL # BLD: 1.8 % (ref 0–4.5)
GLUCOSE SERPL-MCNC: 117 MG/DL (ref 74–106)
HCT VFR BLD CALC: 34.8 % (ref 35.4–49)
HCT VFR BLDV CALC: 37 % (ref 35.4–49)
HGB BLD-MCNC: 11.5 GM/DL (ref 11.7–16.9)
LYMPHOCYTES # BLD: 15 % (ref 8–40)
MAGNESIUM SERPL-MCNC: 2 MG/DL (ref 1.8–2.4)
MCH RBC QN AUTO: 25.8 PG (ref 25.7–33.7)
MCHC RBC AUTO-ENTMCNC: 32.9 G/DL (ref 32–35.9)
MCV RBC: 78.6 FL (ref 80–96)
MONOCYTES # BLD AUTO: 9.7 % (ref 3.8–10.2)
NEUTROPHILS # BLD: 73.4 % (ref 42.8–82.8)
PCO2 BLDA: 39.1 MMHG (ref 35–45)
PHOSPHATE SERPL-MCNC: 3.7 MG/DL (ref 2.5–4.9)
PLATELET # BLD AUTO: 372 10^3/UL (ref 134–434)
PMV BLD: 6.4 FL (ref 7.5–11.1)
PO2 BLDA: 95.6 MMHG (ref 80–100)
PROT SERPL-MCNC: 6.4 G/DL (ref 6.4–8.2)
RBC # BLD AUTO: 4.43 M/MM3 (ref 4–5.6)
SAO2 % BLDA: 97.5 % (ref 95–98)
SODIUM SERPL-SCNC: 141 MMOL/L (ref 136–145)
VENTILATION MODE VENT: (no result)
WBC # BLD AUTO: 7.7 K/MM3 (ref 4–10)

## 2022-09-05 RX ADMIN — MEROPENEM SCH MLS/HR: 1 INJECTION, POWDER, FOR SOLUTION INTRAVENOUS at 03:01

## 2022-09-05 RX ADMIN — INSULIN ASPART SCH: 100 INJECTION, SOLUTION INTRAVENOUS; SUBCUTANEOUS at 17:00

## 2022-09-05 RX ADMIN — INSULIN ASPART SCH: 100 INJECTION, SOLUTION INTRAVENOUS; SUBCUTANEOUS at 11:35

## 2022-09-05 RX ADMIN — POLYETHYLENE GLYCOL 3350 SCH GM: 17 POWDER, FOR SOLUTION ORAL at 09:06

## 2022-09-05 RX ADMIN — FENTANYL CITRATE SCH MLS/HR: 0.05 INJECTION, SOLUTION INTRAMUSCULAR; INTRAVENOUS at 14:40

## 2022-09-05 RX ADMIN — CHLORHEXIDINE GLUCONATE SCH APPLIC: 213 SOLUTION TOPICAL at 22:47

## 2022-09-05 RX ADMIN — ENOXAPARIN SODIUM SCH MG: 40 INJECTION SUBCUTANEOUS at 09:05

## 2022-09-05 RX ADMIN — FENTANYL CITRATE SCH MLS/HR: 0.05 INJECTION, SOLUTION INTRAMUSCULAR; INTRAVENOUS at 08:03

## 2022-09-05 RX ADMIN — SENNOSIDES SCH TAB: 8.6 TABLET, FILM COATED ORAL at 09:06

## 2022-09-05 RX ADMIN — VASOPRESSIN SCH: 20 INJECTION INTRAVENOUS at 17:32

## 2022-09-05 RX ADMIN — VANCOMYCIN SCH MLS/HR: 1.5 INJECTION, SOLUTION INTRAVENOUS at 22:48

## 2022-09-05 RX ADMIN — MUPIROCIN SCH APPLIC: 20 OINTMENT TOPICAL at 09:05

## 2022-09-05 RX ADMIN — SENNOSIDES SCH: 8.6 TABLET, FILM COATED ORAL at 22:48

## 2022-09-05 RX ADMIN — SENNOSIDES SCH: 8.6 TABLET, FILM COATED ORAL at 10:00

## 2022-09-05 RX ADMIN — Medication SCH MLS/HR: at 09:04

## 2022-09-05 RX ADMIN — FENTANYL CITRATE SCH MLS/HR: 0.05 INJECTION, SOLUTION INTRAMUSCULAR; INTRAVENOUS at 23:04

## 2022-09-05 RX ADMIN — MEROPENEM SCH MLS/HR: 1 INJECTION, POWDER, FOR SOLUTION INTRAVENOUS at 09:07

## 2022-09-05 RX ADMIN — PANTOPRAZOLE SODIUM SCH MG: 40 INJECTION, POWDER, FOR SOLUTION INTRAVENOUS at 09:06

## 2022-09-05 RX ADMIN — Medication SCH MLS/HR: at 23:05

## 2022-09-05 RX ADMIN — INSULIN ASPART SCH: 100 INJECTION, SOLUTION INTRAVENOUS; SUBCUTANEOUS at 22:47

## 2022-09-05 RX ADMIN — MEROPENEM SCH MLS/HR: 1 INJECTION, POWDER, FOR SOLUTION INTRAVENOUS at 17:34

## 2022-09-05 RX ADMIN — VANCOMYCIN SCH MLS/HR: 1.5 INJECTION, SOLUTION INTRAVENOUS at 09:07

## 2022-09-05 RX ADMIN — FENTANYL CITRATE SCH MLS/HR: 0.05 INJECTION, SOLUTION INTRAMUSCULAR; INTRAVENOUS at 07:02

## 2022-09-05 RX ADMIN — Medication SCH ML: at 08:04

## 2022-09-05 RX ADMIN — MUPIROCIN SCH APPLIC: 20 OINTMENT TOPICAL at 22:47

## 2022-09-05 RX ADMIN — Medication SCH ML: at 12:21

## 2022-09-05 RX ADMIN — POLYETHYLENE GLYCOL 3350 SCH: 17 POWDER, FOR SOLUTION ORAL at 10:00

## 2022-09-05 RX ADMIN — Medication SCH ML: at 17:34

## 2022-09-05 RX ADMIN — INSULIN ASPART SCH: 100 INJECTION, SOLUTION INTRAVENOUS; SUBCUTANEOUS at 07:02

## 2022-09-06 LAB
ANION GAP SERPL CALC-SCNC: 7 MMOL/L (ref 8–16)
BUN SERPL-MCNC: 7.2 MG/DL (ref 7–18)
CALCIUM SERPL-MCNC: 7.4 MG/DL (ref 8.5–10.1)
CHLORIDE SERPL-SCNC: 107 MMOL/L (ref 98–107)
CO2 SERPL-SCNC: 27 MMOL/L (ref 21–32)
CREAT SERPL-MCNC: 0.3 MG/DL (ref 0.55–1.3)
DEPRECATED RDW RBC AUTO: 16.6 % (ref 11.9–15.9)
GLUCOSE SERPL-MCNC: 134 MG/DL (ref 74–106)
HCT VFR BLD CALC: 36.1 % (ref 35.4–49)
HGB BLD-MCNC: 11.6 GM/DL (ref 11.7–16.9)
MAGNESIUM SERPL-MCNC: 1.9 MG/DL (ref 1.8–2.4)
MCH RBC QN AUTO: 25.2 PG (ref 25.7–33.7)
MCHC RBC AUTO-ENTMCNC: 32.1 G/DL (ref 32–35.9)
MCV RBC: 78.5 FL (ref 80–96)
PHOSPHATE SERPL-MCNC: 3.1 MG/DL (ref 2.5–4.9)
PLATELET # BLD AUTO: 355 10^3/UL (ref 134–434)
PMV BLD: 6.7 FL (ref 7.5–11.1)
RBC # BLD AUTO: 4.6 M/MM3 (ref 4–5.6)
SODIUM SERPL-SCNC: 141 MMOL/L (ref 136–145)
WBC # BLD AUTO: 9.6 K/MM3 (ref 4–10)

## 2022-09-06 RX ADMIN — POTASSIUM CHLORIDE SCH MLS/HR: 7.46 INJECTION, SOLUTION INTRAVENOUS at 11:43

## 2022-09-06 RX ADMIN — SENNOSIDES SCH: 8.6 TABLET, FILM COATED ORAL at 09:11

## 2022-09-06 RX ADMIN — MEROPENEM SCH MLS/HR: 1 INJECTION, POWDER, FOR SOLUTION INTRAVENOUS at 17:03

## 2022-09-06 RX ADMIN — VANCOMYCIN SCH MLS/HR: 1.5 INJECTION, SOLUTION INTRAVENOUS at 21:37

## 2022-09-06 RX ADMIN — PANTOPRAZOLE SODIUM SCH MG: 40 INJECTION, POWDER, FOR SOLUTION INTRAVENOUS at 09:09

## 2022-09-06 RX ADMIN — Medication SCH ML: at 11:43

## 2022-09-06 RX ADMIN — INSULIN ASPART SCH UNIT: 100 INJECTION, SOLUTION INTRAVENOUS; SUBCUTANEOUS at 16:42

## 2022-09-06 RX ADMIN — POLYETHYLENE GLYCOL 3350 SCH GM: 17 POWDER, FOR SOLUTION ORAL at 09:08

## 2022-09-06 RX ADMIN — VANCOMYCIN SCH MLS/HR: 1.5 INJECTION, SOLUTION INTRAVENOUS at 09:09

## 2022-09-06 RX ADMIN — MEROPENEM SCH MLS/HR: 1 INJECTION, POWDER, FOR SOLUTION INTRAVENOUS at 02:01

## 2022-09-06 RX ADMIN — FENTANYL CITRATE SCH MLS/HR: 0.05 INJECTION, SOLUTION INTRAMUSCULAR; INTRAVENOUS at 05:05

## 2022-09-06 RX ADMIN — NOREPINEPHRINE BITARTRATE SCH MLS/HR: 1 INJECTION, SOLUTION, CONCENTRATE INTRAVENOUS at 11:39

## 2022-09-06 RX ADMIN — MUPIROCIN SCH APPLIC: 20 OINTMENT TOPICAL at 09:08

## 2022-09-06 RX ADMIN — CHLORHEXIDINE GLUCONATE SCH APPLIC: 213 SOLUTION TOPICAL at 21:38

## 2022-09-06 RX ADMIN — POTASSIUM CHLORIDE SCH MLS/HR: 7.46 INJECTION, SOLUTION INTRAVENOUS at 13:54

## 2022-09-06 RX ADMIN — SENNOSIDES SCH: 8.6 TABLET, FILM COATED ORAL at 21:38

## 2022-09-06 RX ADMIN — INSULIN ASPART SCH: 100 INJECTION, SOLUTION INTRAVENOUS; SUBCUTANEOUS at 06:38

## 2022-09-06 RX ADMIN — INSULIN ASPART SCH: 100 INJECTION, SOLUTION INTRAVENOUS; SUBCUTANEOUS at 21:42

## 2022-09-06 RX ADMIN — Medication SCH ML: at 16:43

## 2022-09-06 RX ADMIN — Medication SCH ML: at 08:16

## 2022-09-06 RX ADMIN — POTASSIUM CHLORIDE SCH MLS/HR: 7.46 INJECTION, SOLUTION INTRAVENOUS at 11:45

## 2022-09-06 RX ADMIN — MEROPENEM SCH MLS/HR: 1 INJECTION, POWDER, FOR SOLUTION INTRAVENOUS at 09:09

## 2022-09-06 RX ADMIN — ENOXAPARIN SODIUM SCH MG: 40 INJECTION SUBCUTANEOUS at 09:09

## 2022-09-06 RX ADMIN — INSULIN ASPART SCH UNIT: 100 INJECTION, SOLUTION INTRAVENOUS; SUBCUTANEOUS at 11:33

## 2022-09-07 LAB
ALBUMIN SERPL-MCNC: 1.6 G/DL (ref 3.4–5)
ALP SERPL-CCNC: 118 U/L (ref 45–117)
ALT SERPL-CCNC: 42 U/L (ref 13–61)
ANION GAP SERPL CALC-SCNC: 5 MMOL/L (ref 8–16)
AST SERPL-CCNC: 205 U/L (ref 15–37)
BILIRUB SERPL-MCNC: 1.4 MG/DL (ref 0.2–1)
BUN SERPL-MCNC: 7.6 MG/DL (ref 7–18)
CALCIUM SERPL-MCNC: 7.6 MG/DL (ref 8.5–10.1)
CHLORIDE SERPL-SCNC: 105 MMOL/L (ref 98–107)
CO2 SERPL-SCNC: 28 MMOL/L (ref 21–32)
CREAT SERPL-MCNC: 0.2 MG/DL (ref 0.55–1.3)
DEPRECATED RDW RBC AUTO: 17.3 % (ref 11.9–15.9)
GLUCOSE SERPL-MCNC: 118 MG/DL (ref 74–106)
HCT VFR BLD CALC: 35.3 % (ref 35.4–49)
HGB BLD-MCNC: 11.3 GM/DL (ref 11.7–16.9)
MAGNESIUM SERPL-MCNC: 2 MG/DL (ref 1.8–2.4)
MCH RBC QN AUTO: 25.2 PG (ref 25.7–33.7)
MCHC RBC AUTO-ENTMCNC: 31.9 G/DL (ref 32–35.9)
MCV RBC: 79 FL (ref 80–96)
PHOSPHATE SERPL-MCNC: 2 MG/DL (ref 2.5–4.9)
PLATELET # BLD AUTO: 368 10^3/UL (ref 134–434)
PMV BLD: 6.8 FL (ref 7.5–11.1)
PROT SERPL-MCNC: 6.2 G/DL (ref 6.4–8.2)
RBC # BLD AUTO: 4.47 M/MM3 (ref 4–5.6)
SODIUM SERPL-SCNC: 138 MMOL/L (ref 136–145)
WBC # BLD AUTO: 10.6 K/MM3 (ref 4–10)

## 2022-09-07 PROCEDURE — 0BH17EZ INSERTION OF ENDOTRACHEAL AIRWAY INTO TRACHEA, VIA NATURAL OR ARTIFICIAL OPENING: ICD-10-PCS | Performed by: INTERNAL MEDICINE

## 2022-09-07 RX ADMIN — INSULIN ASPART SCH: 100 INJECTION, SOLUTION INTRAVENOUS; SUBCUTANEOUS at 10:17

## 2022-09-07 RX ADMIN — VANCOMYCIN SCH MLS/HR: 1.5 INJECTION, SOLUTION INTRAVENOUS at 09:32

## 2022-09-07 RX ADMIN — INSULIN ASPART SCH: 100 INJECTION, SOLUTION INTRAVENOUS; SUBCUTANEOUS at 17:10

## 2022-09-07 RX ADMIN — CHLORHEXIDINE GLUCONATE SCH APPLIC: 213 SOLUTION TOPICAL at 21:45

## 2022-09-07 RX ADMIN — Medication SCH: at 19:56

## 2022-09-07 RX ADMIN — Medication SCH ML: at 09:37

## 2022-09-07 RX ADMIN — VANCOMYCIN SCH MLS/HR: 1.5 INJECTION, SOLUTION INTRAVENOUS at 21:45

## 2022-09-07 RX ADMIN — INSULIN ASPART SCH: 100 INJECTION, SOLUTION INTRAVENOUS; SUBCUTANEOUS at 21:40

## 2022-09-07 RX ADMIN — NOREPINEPHRINE BITARTRATE SCH: 1 INJECTION, SOLUTION, CONCENTRATE INTRAVENOUS at 19:56

## 2022-09-07 RX ADMIN — Medication SCH ML: at 14:04

## 2022-09-07 RX ADMIN — SENNOSIDES SCH: 8.6 TABLET, FILM COATED ORAL at 21:24

## 2022-09-07 RX ADMIN — MEROPENEM SCH MLS/HR: 1 INJECTION, POWDER, FOR SOLUTION INTRAVENOUS at 02:39

## 2022-09-07 RX ADMIN — MEROPENEM SCH MLS/HR: 1 INJECTION, POWDER, FOR SOLUTION INTRAVENOUS at 17:10

## 2022-09-07 RX ADMIN — SENNOSIDES SCH TAB: 8.6 TABLET, FILM COATED ORAL at 09:37

## 2022-09-07 RX ADMIN — ENOXAPARIN SODIUM SCH MG: 40 INJECTION SUBCUTANEOUS at 09:37

## 2022-09-07 RX ADMIN — Medication SCH: at 19:55

## 2022-09-07 RX ADMIN — PANTOPRAZOLE SODIUM SCH MG: 40 INJECTION, POWDER, FOR SOLUTION INTRAVENOUS at 09:37

## 2022-09-07 RX ADMIN — MEROPENEM SCH MLS/HR: 1 INJECTION, POWDER, FOR SOLUTION INTRAVENOUS at 09:37

## 2022-09-07 RX ADMIN — INSULIN ASPART SCH: 100 INJECTION, SOLUTION INTRAVENOUS; SUBCUTANEOUS at 07:48

## 2022-09-07 RX ADMIN — FENTANYL CITRATE SCH: 0.05 INJECTION, SOLUTION INTRAMUSCULAR; INTRAVENOUS at 19:56

## 2022-09-07 RX ADMIN — NOREPINEPHRINE BITARTRATE SCH MLS/HR: 1 INJECTION, SOLUTION, CONCENTRATE INTRAVENOUS at 02:51

## 2022-09-07 RX ADMIN — POLYETHYLENE GLYCOL 3350 SCH: 17 POWDER, FOR SOLUTION ORAL at 10:02

## 2022-09-07 RX ADMIN — Medication SCH ML: at 17:10

## 2022-09-07 RX ADMIN — FENTANYL CITRATE SCH: 0.05 INJECTION, SOLUTION INTRAMUSCULAR; INTRAVENOUS at 19:55

## 2022-09-08 LAB
ALBUMIN SERPL-MCNC: 1.4 G/DL (ref 3.4–5)
ALP SERPL-CCNC: 95 U/L (ref 45–117)
ALT SERPL-CCNC: 60 U/L (ref 13–61)
ANION GAP SERPL CALC-SCNC: 8 MMOL/L (ref 8–16)
ANISOCYTOSIS BLD QL: 0
APPEARANCE UR: (no result)
AST SERPL-CCNC: 211 U/L (ref 15–37)
BACTERIA # UR AUTO: 11 /UL (ref 0–1359)
BASO STIPL BLD QL SMEAR: 0
BILIRUB SERPL-MCNC: 1.3 MG/DL (ref 0.2–1)
BILIRUB UR STRIP.AUTO-MCNC: (no result) MG/DL
BUN SERPL-MCNC: 15.6 MG/DL (ref 7–18)
CALCIUM SERPL-MCNC: 7.3 MG/DL (ref 8.5–10.1)
CASTS URNS QL MICRO: 194 /UL (ref 0–3.1)
CHLORIDE SERPL-SCNC: 104 MMOL/L (ref 98–107)
CO2 SERPL-SCNC: 27 MMOL/L (ref 21–32)
COLOR UR: (no result)
CREAT SERPL-MCNC: 0.6 MG/DL (ref 0.55–1.3)
DACRYOCYTES BLD QL SMEAR: 0
DEPRECATED RDW RBC AUTO: 17.1 % (ref 11.9–15.9)
DOHLE BOD BLD QL SMEAR: 0
EPITH CASTS URNS QL MICRO: >36 /UL (ref 0–25.1)
ERYTHROCYTE [SEDIMENTATION RATE] IN BLOOD BY WESTERGREN METHOD: 43 MM/HR (ref 0–10)
GLUCOSE SERPL-MCNC: 114 MG/DL (ref 74–106)
HCT VFR BLD CALC: 38.2 % (ref 35.4–49)
HELMET CELLS BLD QL SMEAR: 0
HGB BLD-MCNC: 11.8 GM/DL (ref 11.7–16.9)
HOWELL-JOLLY BOD BLD QL SMEAR: 0
KETONES UR QL STRIP: (no result)
LEUKOCYTE ESTERASE UR QL STRIP.AUTO: (no result)
MACROCYTES BLD QL: 0
MAGNESIUM SERPL-MCNC: 1.9 MG/DL (ref 1.8–2.4)
MCH RBC QN AUTO: 24.9 PG (ref 25.7–33.7)
MCHC RBC AUTO-ENTMCNC: 31 G/DL (ref 32–35.9)
MCV RBC: 80.3 FL (ref 80–96)
NITRITE UR QL STRIP: NEGATIVE
OVALOCYTES BLD QL SMEAR: 0
PH UR: 5.5 [PH] (ref 5–8)
PHOSPHATE SERPL-MCNC: 3.2 MG/DL (ref 2.5–4.9)
PLATELET # BLD AUTO: 452 10^3/UL (ref 134–434)
PMV BLD: 6.9 FL (ref 7.5–11.1)
PROT SERPL-MCNC: 5.4 G/DL (ref 6.4–8.2)
PROT UR QL STRIP: (no result)
PROT UR QL STRIP: NEGATIVE
RBC # BLD AUTO: 273.5 /UL (ref 0–23.9)
RBC # BLD AUTO: 4.75 M/MM3 (ref 4–5.6)
ROULEAUX BLD QL SMEAR: 0
SICKLE CELLS BLD QL SMEAR: 0
SODIUM SERPL-SCNC: 139 MMOL/L (ref 136–145)
SP GR UR: 1.02 (ref 1.01–1.03)
TARGETS BLD QL SMEAR: 0
TOXIC GRANULES BLD QL SMEAR: 0
UROBILINOGEN UR STRIP-MCNC: 1 MG/DL (ref 0.2–1)
WBC # BLD AUTO: 16.8 K/MM3 (ref 4–10)
WBC # UR AUTO: 382 /UL (ref 0–25.8)
YEAST BUDDING URNS QL: PRESENT

## 2022-09-08 PROCEDURE — B544ZZA ULTRASONOGRAPHY OF LEFT JUGULAR VEINS, GUIDANCE: ICD-10-PCS | Performed by: INTERNAL MEDICINE

## 2022-09-08 PROCEDURE — 05HN33Z INSERTION OF INFUSION DEVICE INTO LEFT INTERNAL JUGULAR VEIN, PERCUTANEOUS APPROACH: ICD-10-PCS | Performed by: INTERNAL MEDICINE

## 2022-09-08 RX ADMIN — Medication SCH ML: at 10:05

## 2022-09-08 RX ADMIN — Medication SCH ML: at 17:26

## 2022-09-08 RX ADMIN — VASOPRESSIN SCH MLS/HR: 20 INJECTION INTRAVENOUS at 13:27

## 2022-09-08 RX ADMIN — HYDROCORTISONE SODIUM SUCCINATE SCH MG: 100 INJECTION, POWDER, FOR SOLUTION INTRAMUSCULAR; INTRAVENOUS at 13:28

## 2022-09-08 RX ADMIN — POLYETHYLENE GLYCOL 3350 SCH: 17 POWDER, FOR SOLUTION ORAL at 09:56

## 2022-09-08 RX ADMIN — FENTANYL CITRATE SCH MLS/HR: 0.05 INJECTION, SOLUTION INTRAMUSCULAR; INTRAVENOUS at 22:13

## 2022-09-08 RX ADMIN — SODIUM CHLORIDE SCH MLS/HR: 9 INJECTION, SOLUTION INTRAVENOUS at 13:28

## 2022-09-08 RX ADMIN — HYDROCORTISONE SODIUM SUCCINATE SCH MG: 100 INJECTION, POWDER, FOR SOLUTION INTRAMUSCULAR; INTRAVENOUS at 17:27

## 2022-09-08 RX ADMIN — VANCOMYCIN SCH MLS/HR: 1.5 INJECTION, SOLUTION INTRAVENOUS at 10:04

## 2022-09-08 RX ADMIN — CHLORHEXIDINE GLUCONATE SCH APPLIC: 213 SOLUTION TOPICAL at 21:56

## 2022-09-08 RX ADMIN — SENNOSIDES SCH: 8.6 TABLET, FILM COATED ORAL at 09:57

## 2022-09-08 RX ADMIN — INSULIN ASPART SCH: 100 INJECTION, SOLUTION INTRAVENOUS; SUBCUTANEOUS at 13:29

## 2022-09-08 RX ADMIN — INSULIN ASPART SCH UNIT: 100 INJECTION, SOLUTION INTRAVENOUS; SUBCUTANEOUS at 17:20

## 2022-09-08 RX ADMIN — FENTANYL CITRATE SCH MLS/HR: 0.05 INJECTION, SOLUTION INTRAMUSCULAR; INTRAVENOUS at 17:35

## 2022-09-08 RX ADMIN — NOREPINEPHRINE BITARTRATE SCH MLS/HR: 1 INJECTION, SOLUTION, CONCENTRATE INTRAVENOUS at 13:27

## 2022-09-08 RX ADMIN — INSULIN ASPART SCH: 100 INJECTION, SOLUTION INTRAVENOUS; SUBCUTANEOUS at 06:24

## 2022-09-08 RX ADMIN — MEROPENEM SCH MLS/HR: 1 INJECTION, POWDER, FOR SOLUTION INTRAVENOUS at 01:20

## 2022-09-08 RX ADMIN — VANCOMYCIN SCH MLS/HR: 1.5 INJECTION, SOLUTION INTRAVENOUS at 21:55

## 2022-09-08 RX ADMIN — MEROPENEM SCH MLS/HR: 1 INJECTION, POWDER, FOR SOLUTION INTRAVENOUS at 17:25

## 2022-09-08 RX ADMIN — Medication SCH MLS/HR: at 13:26

## 2022-09-08 RX ADMIN — INSULIN ASPART SCH UNIT: 100 INJECTION, SOLUTION INTRAVENOUS; SUBCUTANEOUS at 22:01

## 2022-09-08 RX ADMIN — FENTANYL CITRATE SCH MLS/HR: 0.05 INJECTION, SOLUTION INTRAMUSCULAR; INTRAVENOUS at 06:31

## 2022-09-08 RX ADMIN — Medication SCH ML: at 13:28

## 2022-09-08 RX ADMIN — MEROPENEM SCH MLS/HR: 1 INJECTION, POWDER, FOR SOLUTION INTRAVENOUS at 10:04

## 2022-09-08 RX ADMIN — ENOXAPARIN SODIUM SCH MG: 40 INJECTION SUBCUTANEOUS at 10:04

## 2022-09-08 RX ADMIN — SENNOSIDES SCH: 8.6 TABLET, FILM COATED ORAL at 21:54

## 2022-09-08 RX ADMIN — PANTOPRAZOLE SODIUM SCH MG: 40 INJECTION, POWDER, FOR SOLUTION INTRAVENOUS at 10:04

## 2022-09-09 LAB
ALBUMIN SERPL-MCNC: 1.4 G/DL (ref 3.4–5)
ALP SERPL-CCNC: 90 U/L (ref 45–117)
ALT SERPL-CCNC: 44 U/L (ref 13–61)
ANION GAP SERPL CALC-SCNC: 5 MMOL/L (ref 8–16)
ANISOCYTOSIS BLD QL: 0
AST SERPL-CCNC: 79 U/L (ref 15–37)
BILIRUB SERPL-MCNC: 1.6 MG/DL (ref 0.2–1)
BUN SERPL-MCNC: 12.6 MG/DL (ref 7–18)
CALCIUM SERPL-MCNC: 7.1 MG/DL (ref 8.5–10.1)
CHLORIDE SERPL-SCNC: 102 MMOL/L (ref 98–107)
CO2 SERPL-SCNC: 28 MMOL/L (ref 21–32)
CREAT SERPL-MCNC: 0.3 MG/DL (ref 0.55–1.3)
DEPRECATED RDW RBC AUTO: 16.9 % (ref 11.9–15.9)
GLUCOSE SERPL-MCNC: 188 MG/DL (ref 74–106)
HCT VFR BLD CALC: 33.4 % (ref 35.4–49)
HGB BLD-MCNC: 10.5 GM/DL (ref 11.7–16.9)
MACROCYTES BLD QL: 0
MAGNESIUM SERPL-MCNC: 2 MG/DL (ref 1.8–2.4)
MCH RBC QN AUTO: 25 PG (ref 25.7–33.7)
MCHC RBC AUTO-ENTMCNC: 31.4 G/DL (ref 32–35.9)
MCV RBC: 79.5 FL (ref 80–96)
PHOSPHATE SERPL-MCNC: 1.6 MG/DL (ref 2.5–4.9)
PLATELET # BLD AUTO: 395 10^3/UL (ref 134–434)
PMV BLD: 6.9 FL (ref 7.5–11.1)
PROT SERPL-MCNC: 5.9 G/DL (ref 6.4–8.2)
RBC # BLD AUTO: 4.2 M/MM3 (ref 4–5.6)
SODIUM SERPL-SCNC: 135 MMOL/L (ref 136–145)
WBC # BLD AUTO: 29 K/MM3 (ref 4–10)

## 2022-09-09 RX ADMIN — HYDROCORTISONE SODIUM SUCCINATE SCH MG: 100 INJECTION, POWDER, FOR SOLUTION INTRAMUSCULAR; INTRAVENOUS at 12:11

## 2022-09-09 RX ADMIN — PANTOPRAZOLE SODIUM SCH MG: 40 INJECTION, POWDER, FOR SOLUTION INTRAVENOUS at 09:38

## 2022-09-09 RX ADMIN — CHLORHEXIDINE GLUCONATE SCH APPLIC: 213 SOLUTION TOPICAL at 21:50

## 2022-09-09 RX ADMIN — FENTANYL CITRATE SCH MLS/HR: 0.05 INJECTION, SOLUTION INTRAMUSCULAR; INTRAVENOUS at 08:37

## 2022-09-09 RX ADMIN — NOREPINEPHRINE BITARTRATE SCH MLS/HR: 1 INJECTION, SOLUTION, CONCENTRATE INTRAVENOUS at 23:07

## 2022-09-09 RX ADMIN — INSULIN ASPART SCH UNIT: 100 INJECTION, SOLUTION INTRAVENOUS; SUBCUTANEOUS at 12:09

## 2022-09-09 RX ADMIN — Medication SCH MLS/HR: at 19:05

## 2022-09-09 RX ADMIN — MEROPENEM SCH MLS/HR: 1 INJECTION, POWDER, FOR SOLUTION INTRAVENOUS at 17:34

## 2022-09-09 RX ADMIN — ROCURONIUM BROMIDE SCH MLS/HR: 10 INJECTION, SOLUTION INTRAVENOUS at 12:11

## 2022-09-09 RX ADMIN — INSULIN ASPART SCH UNIT: 100 INJECTION, SOLUTION INTRAVENOUS; SUBCUTANEOUS at 06:05

## 2022-09-09 RX ADMIN — HYDROCORTISONE SODIUM SUCCINATE SCH MG: 100 INJECTION, POWDER, FOR SOLUTION INTRAMUSCULAR; INTRAVENOUS at 00:32

## 2022-09-09 RX ADMIN — FENTANYL CITRATE SCH MLS/HR: 0.05 INJECTION, SOLUTION INTRAMUSCULAR; INTRAVENOUS at 19:06

## 2022-09-09 RX ADMIN — ENOXAPARIN SODIUM SCH MG: 40 INJECTION SUBCUTANEOUS at 09:36

## 2022-09-09 RX ADMIN — VANCOMYCIN SCH MLS/HR: 1.5 INJECTION, SOLUTION INTRAVENOUS at 21:51

## 2022-09-09 RX ADMIN — NOREPINEPHRINE BITARTRATE SCH MLS/HR: 1 INJECTION, SOLUTION, CONCENTRATE INTRAVENOUS at 11:30

## 2022-09-09 RX ADMIN — POLYETHYLENE GLYCOL 3350 SCH: 17 POWDER, FOR SOLUTION ORAL at 09:36

## 2022-09-09 RX ADMIN — INSULIN ASPART SCH UNIT: 100 INJECTION, SOLUTION INTRAVENOUS; SUBCUTANEOUS at 21:50

## 2022-09-09 RX ADMIN — MEROPENEM SCH MLS/HR: 1 INJECTION, POWDER, FOR SOLUTION INTRAVENOUS at 09:34

## 2022-09-09 RX ADMIN — SENNOSIDES SCH: 8.6 TABLET, FILM COATED ORAL at 09:36

## 2022-09-09 RX ADMIN — HYDROCORTISONE SODIUM SUCCINATE SCH MG: 100 INJECTION, POWDER, FOR SOLUTION INTRAMUSCULAR; INTRAVENOUS at 06:05

## 2022-09-09 RX ADMIN — VASOPRESSIN SCH MLS/HR: 20 INJECTION INTRAVENOUS at 05:25

## 2022-09-09 RX ADMIN — MINERAL OIL AND WHITE PETROLATUM PRN APPLIC: 150; 830 OINTMENT OPHTHALMIC at 12:17

## 2022-09-09 RX ADMIN — NOREPINEPHRINE BITARTRATE SCH MLS/HR: 1 INJECTION, SOLUTION, CONCENTRATE INTRAVENOUS at 00:28

## 2022-09-09 RX ADMIN — MEROPENEM SCH MLS/HR: 1 INJECTION, POWDER, FOR SOLUTION INTRAVENOUS at 01:40

## 2022-09-09 RX ADMIN — VANCOMYCIN SCH MLS/HR: 1.5 INJECTION, SOLUTION INTRAVENOUS at 09:34

## 2022-09-09 RX ADMIN — INSULIN ASPART SCH: 100 INJECTION, SOLUTION INTRAVENOUS; SUBCUTANEOUS at 17:55

## 2022-09-09 RX ADMIN — Medication SCH MLS/HR: at 05:24

## 2022-09-09 RX ADMIN — IBUPROFEN PRN MG: 800 INJECTION INTRAVENOUS at 06:53

## 2022-09-09 RX ADMIN — SODIUM CHLORIDE SCH MLS/HR: 9 INJECTION, SOLUTION INTRAVENOUS at 21:56

## 2022-09-09 RX ADMIN — SODIUM CHLORIDE SCH MLS/HR: 9 INJECTION, SOLUTION INTRAVENOUS at 13:12

## 2022-09-09 RX ADMIN — Medication SCH ML: at 17:34

## 2022-09-09 RX ADMIN — SODIUM CHLORIDE SCH MLS/HR: 9 INJECTION, SOLUTION INTRAVENOUS at 06:47

## 2022-09-09 RX ADMIN — SENNOSIDES SCH TAB: 8.6 TABLET, FILM COATED ORAL at 12:00

## 2022-09-09 RX ADMIN — Medication SCH ML: at 08:36

## 2022-09-09 RX ADMIN — SENNOSIDES SCH TAB: 8.6 TABLET, FILM COATED ORAL at 21:51

## 2022-09-09 RX ADMIN — Medication SCH ML: at 12:11

## 2022-09-09 RX ADMIN — HYDROCORTISONE SODIUM SUCCINATE SCH MG: 100 INJECTION, POWDER, FOR SOLUTION INTRAMUSCULAR; INTRAVENOUS at 17:35

## 2022-09-10 LAB
ALBUMIN SERPL-MCNC: 1.3 G/DL (ref 3.4–5)
ALP SERPL-CCNC: 95 U/L (ref 45–117)
ALT SERPL-CCNC: 29 U/L (ref 13–61)
ANION GAP SERPL CALC-SCNC: 9 MMOL/L (ref 8–16)
ANISOCYTOSIS BLD QL: (no result)
ARTERIAL PATENCY WRIST A: POSITIVE
AST SERPL-CCNC: 48 U/L (ref 15–37)
BASE EXCESS BLDA CALC-SCNC: -11.3 MMOL/L (ref -2–2)
BASE EXCESS BLDA CALC-SCNC: -8.8 MMOL/L (ref -2–2)
BILIRUB SERPL-MCNC: 1.2 MG/DL (ref 0.2–1)
BREATHS.MECHANICAL ON VENT: 16
BREATHS.MECHANICAL ON VENT: 16
BUN SERPL-MCNC: 27.9 MG/DL (ref 7–18)
CALCIUM SERPL-MCNC: 7.3 MG/DL (ref 8.5–10.1)
CHLORIDE SERPL-SCNC: 99 MMOL/L (ref 98–107)
CO2 SERPL-SCNC: 25 MMOL/L (ref 21–32)
CREAT SERPL-MCNC: 0.6 MG/DL (ref 0.55–1.3)
DEPRECATED RDW RBC AUTO: 17.4 % (ref 11.9–15.9)
GLUCOSE SERPL-MCNC: 218 MG/DL (ref 74–106)
HCT VFR BLD CALC: 38.3 % (ref 35.4–49)
HCT VFR BLDV CALC: 37 % (ref 35.4–49)
HCT VFR BLDV CALC: 40 % (ref 35.4–49)
HGB BLD-MCNC: 11.7 GM/DL (ref 11.7–16.9)
MACROCYTES BLD QL: 0
MAGNESIUM SERPL-MCNC: 2.1 MG/DL (ref 1.8–2.4)
MCH RBC QN AUTO: 25.1 PG (ref 25.7–33.7)
MCHC RBC AUTO-ENTMCNC: 30.5 G/DL (ref 32–35.9)
MCV RBC: 82.3 FL (ref 80–96)
PCO2 BLDA: 67.6 MMHG (ref 35–45)
PCO2 BLDA: 78.6 MMHG (ref 35–45)
PHOSPHATE SERPL-MCNC: 3.5 MG/DL (ref 2.5–4.9)
PLATELET # BLD AUTO: 531 10^3/UL (ref 134–434)
PMV BLD: 7.4 FL (ref 7.5–11.1)
PO2 BLDA: 52.8 MMHG (ref 80–100)
PO2 BLDA: 55 MMHG (ref 80–100)
PROT SERPL-MCNC: 5.9 G/DL (ref 6.4–8.2)
RBC # BLD AUTO: 4.66 M/MM3 (ref 4–5.6)
SAO2 % BLDA: 69.6 % (ref 95–98)
SAO2 % BLDA: 77 % (ref 95–98)
SODIUM SERPL-SCNC: 133 MMOL/L (ref 136–145)
VENTILATION MODE VENT: (no result)
VENTILATION MODE VENT: (no result)
WBC # BLD AUTO: 36.2 K/MM3 (ref 4–10)

## 2022-09-10 PROCEDURE — 4A133B1 MONITORING OF ARTERIAL PRESSURE, PERIPHERAL, PERCUTANEOUS APPROACH: ICD-10-PCS | Performed by: INTERNAL MEDICINE

## 2022-09-10 PROCEDURE — 4A133J1 MONITORING OF ARTERIAL PULSE, PERIPHERAL, PERCUTANEOUS APPROACH: ICD-10-PCS | Performed by: INTERNAL MEDICINE

## 2022-09-10 RX ADMIN — Medication SCH ML: at 17:17

## 2022-09-10 RX ADMIN — FENTANYL CITRATE SCH MLS/HR: 0.05 INJECTION, SOLUTION INTRAMUSCULAR; INTRAVENOUS at 17:08

## 2022-09-10 RX ADMIN — VASOPRESSIN SCH MLS/HR: 20 INJECTION INTRAVENOUS at 00:06

## 2022-09-10 RX ADMIN — HYDROCORTISONE SODIUM SUCCINATE SCH MG: 100 INJECTION, POWDER, FOR SOLUTION INTRAMUSCULAR; INTRAVENOUS at 17:17

## 2022-09-10 RX ADMIN — PANTOPRAZOLE SODIUM SCH MG: 40 INJECTION, POWDER, FOR SOLUTION INTRAVENOUS at 09:30

## 2022-09-10 RX ADMIN — HYDROCORTISONE SODIUM SUCCINATE SCH MG: 100 INJECTION, POWDER, FOR SOLUTION INTRAMUSCULAR; INTRAVENOUS at 06:10

## 2022-09-10 RX ADMIN — ROCURONIUM BROMIDE SCH MLS/HR: 10 INJECTION, SOLUTION INTRAVENOUS at 17:00

## 2022-09-10 RX ADMIN — VANCOMYCIN SCH MLS/HR: 1.5 INJECTION, SOLUTION INTRAVENOUS at 09:30

## 2022-09-10 RX ADMIN — ROCURONIUM BROMIDE SCH MLS/HR: 10 INJECTION, SOLUTION INTRAVENOUS at 01:27

## 2022-09-10 RX ADMIN — SODIUM CHLORIDE SCH MLS/HR: 9 INJECTION, SOLUTION INTRAVENOUS at 13:45

## 2022-09-10 RX ADMIN — MEROPENEM SCH MLS/HR: 1 INJECTION, POWDER, FOR SOLUTION INTRAVENOUS at 17:17

## 2022-09-10 RX ADMIN — MINERAL OIL AND WHITE PETROLATUM PRN APPLIC: 150; 830 OINTMENT OPHTHALMIC at 10:00

## 2022-09-10 RX ADMIN — VASOPRESSIN SCH MLS/HR: 20 INJECTION INTRAVENOUS at 11:24

## 2022-09-10 RX ADMIN — SENNOSIDES SCH: 8.6 TABLET, FILM COATED ORAL at 09:19

## 2022-09-10 RX ADMIN — NOREPINEPHRINE BITARTRATE SCH MLS/HR: 1 INJECTION, SOLUTION, CONCENTRATE INTRAVENOUS at 11:11

## 2022-09-10 RX ADMIN — HYDROCORTISONE SODIUM SUCCINATE SCH MG: 100 INJECTION, POWDER, FOR SOLUTION INTRAMUSCULAR; INTRAVENOUS at 00:07

## 2022-09-10 RX ADMIN — VASOPRESSIN SCH MLS/HR: 20 INJECTION INTRAVENOUS at 22:00

## 2022-09-10 RX ADMIN — ENOXAPARIN SODIUM SCH MG: 40 INJECTION SUBCUTANEOUS at 09:30

## 2022-09-10 RX ADMIN — SENNOSIDES SCH: 8.6 TABLET, FILM COATED ORAL at 21:23

## 2022-09-10 RX ADMIN — MEROPENEM SCH MLS/HR: 1 INJECTION, POWDER, FOR SOLUTION INTRAVENOUS at 01:27

## 2022-09-10 RX ADMIN — FENTANYL CITRATE SCH MLS/HR: 0.05 INJECTION, SOLUTION INTRAMUSCULAR; INTRAVENOUS at 06:11

## 2022-09-10 RX ADMIN — INSULIN ASPART SCH UNIT: 100 INJECTION, SOLUTION INTRAVENOUS; SUBCUTANEOUS at 17:46

## 2022-09-10 RX ADMIN — VANCOMYCIN SCH MLS/HR: 1.5 INJECTION, SOLUTION INTRAVENOUS at 21:23

## 2022-09-10 RX ADMIN — HYDROCORTISONE SODIUM SUCCINATE SCH MG: 100 INJECTION, POWDER, FOR SOLUTION INTRAMUSCULAR; INTRAVENOUS at 23:35

## 2022-09-10 RX ADMIN — HYDROCORTISONE SODIUM SUCCINATE SCH MG: 100 INJECTION, POWDER, FOR SOLUTION INTRAMUSCULAR; INTRAVENOUS at 11:12

## 2022-09-10 RX ADMIN — POLYETHYLENE GLYCOL 3350 SCH: 17 POWDER, FOR SOLUTION ORAL at 09:19

## 2022-09-10 RX ADMIN — INSULIN ASPART SCH UNIT: 100 INJECTION, SOLUTION INTRAVENOUS; SUBCUTANEOUS at 11:23

## 2022-09-10 RX ADMIN — SODIUM CHLORIDE SCH MLS/HR: 9 INJECTION, SOLUTION INTRAVENOUS at 21:00

## 2022-09-10 RX ADMIN — MEROPENEM SCH MLS/HR: 1 INJECTION, POWDER, FOR SOLUTION INTRAVENOUS at 09:39

## 2022-09-10 RX ADMIN — Medication SCH ML: at 11:11

## 2022-09-10 RX ADMIN — INSULIN ASPART SCH UNIT: 100 INJECTION, SOLUTION INTRAVENOUS; SUBCUTANEOUS at 21:36

## 2022-09-10 RX ADMIN — IBUPROFEN PRN MG: 800 INJECTION INTRAVENOUS at 10:30

## 2022-09-10 RX ADMIN — Medication SCH ML: at 09:30

## 2022-09-10 RX ADMIN — INSULIN ASPART SCH UNIT: 100 INJECTION, SOLUTION INTRAVENOUS; SUBCUTANEOUS at 06:21

## 2022-09-10 RX ADMIN — CHLORHEXIDINE GLUCONATE SCH APPLIC: 213 SOLUTION TOPICAL at 21:23

## 2022-09-10 RX ADMIN — Medication SCH MLS/HR: at 15:15

## 2022-09-11 LAB
ALBUMIN SERPL-MCNC: 1.3 G/DL (ref 3.4–5)
ALP SERPL-CCNC: 86 U/L (ref 45–117)
ALT SERPL-CCNC: 23 U/L (ref 13–61)
ANION GAP SERPL CALC-SCNC: 13 MMOL/L (ref 8–16)
ANISOCYTOSIS BLD QL: 0
AST SERPL-CCNC: 60 U/L (ref 15–37)
BASE EXCESS BLDA CALC-SCNC: -13.2 MMOL/L (ref -2–2)
BASE EXCESS BLDA CALC-SCNC: -13.5 MMOL/L (ref -2–2)
BILIRUB SERPL-MCNC: 1.4 MG/DL (ref 0.2–1)
BREATHS.MECHANICAL ON VENT: 20
BREATHS.MECHANICAL ON VENT: 20
BUN SERPL-MCNC: 44.2 MG/DL (ref 7–18)
CALCIUM SERPL-MCNC: 6.8 MG/DL (ref 8.5–10.1)
CHLORIDE SERPL-SCNC: 97 MMOL/L (ref 98–107)
CO2 SERPL-SCNC: 20 MMOL/L (ref 21–32)
CREAT SERPL-MCNC: 1.3 MG/DL (ref 0.55–1.3)
DEPRECATED RDW RBC AUTO: 17.3 % (ref 11.9–15.9)
GLUCOSE SERPL-MCNC: 228 MG/DL (ref 74–106)
HCT VFR BLD CALC: 36.8 % (ref 35.4–49)
HCT VFR BLDV CALC: 34 % (ref 35.4–49)
HCT VFR BLDV CALC: 37 % (ref 35.4–49)
HGB BLD-MCNC: 11.1 GM/DL (ref 11.7–16.9)
LACTATE SERPL-MCNC: 3 MMOL/L (ref 0.4–2)
MACROCYTES BLD QL: 0
MCH RBC QN AUTO: 24.9 PG (ref 25.7–33.7)
MCHC RBC AUTO-ENTMCNC: 30.2 G/DL (ref 32–35.9)
MCV RBC: 82.3 FL (ref 80–96)
PCO2 BLDA: 48.5 MMHG (ref 35–45)
PCO2 BLDA: 52.8 MMHG (ref 35–45)
PLATELET # BLD AUTO: 475 10^3/UL (ref 134–434)
PMV BLD: 8.3 FL (ref 7.5–11.1)
PO2 BLDA: 58.8 MMHG (ref 80–100)
PO2 BLDA: 60.7 MMHG (ref 80–100)
PROT SERPL-MCNC: 5.4 G/DL (ref 6.4–8.2)
RBC # BLD AUTO: 4.47 M/MM3 (ref 4–5.6)
SAO2 % BLDA: 81.3 % (ref 95–98)
SAO2 % BLDA: 81.4 % (ref 95–98)
SODIUM SERPL-SCNC: 130 MMOL/L (ref 136–145)
VENTILATION MODE VENT: (no result)
WBC # BLD AUTO: 35.3 K/MM3 (ref 4–10)

## 2022-09-11 RX ADMIN — Medication SCH ML: at 09:57

## 2022-09-11 RX ADMIN — VANCOMYCIN SCH MLS/HR: 1.5 INJECTION, SOLUTION INTRAVENOUS at 21:27

## 2022-09-11 RX ADMIN — SENNOSIDES SCH: 8.6 TABLET, FILM COATED ORAL at 09:57

## 2022-09-11 RX ADMIN — Medication SCH MLS/HR: at 10:00

## 2022-09-11 RX ADMIN — SENNOSIDES SCH: 8.6 TABLET, FILM COATED ORAL at 22:09

## 2022-09-11 RX ADMIN — NOREPINEPHRINE BITARTRATE SCH MLS/HR: 1 INJECTION, SOLUTION, CONCENTRATE INTRAVENOUS at 12:45

## 2022-09-11 RX ADMIN — NOREPINEPHRINE BITARTRATE SCH MLS/HR: 1 INJECTION, SOLUTION, CONCENTRATE INTRAVENOUS at 22:41

## 2022-09-11 RX ADMIN — SODIUM CHLORIDE SCH MLS/HR: 9 INJECTION, SOLUTION INTRAVENOUS at 22:40

## 2022-09-11 RX ADMIN — MEROPENEM SCH MLS/HR: 1 INJECTION, POWDER, FOR SOLUTION INTRAVENOUS at 17:34

## 2022-09-11 RX ADMIN — HYDROCORTISONE SODIUM SUCCINATE SCH MG: 100 INJECTION, POWDER, FOR SOLUTION INTRAMUSCULAR; INTRAVENOUS at 05:29

## 2022-09-11 RX ADMIN — MEROPENEM SCH MLS/HR: 1 INJECTION, POWDER, FOR SOLUTION INTRAVENOUS at 01:03

## 2022-09-11 RX ADMIN — ROCURONIUM BROMIDE SCH: 10 INJECTION, SOLUTION INTRAVENOUS at 22:40

## 2022-09-11 RX ADMIN — CHLORHEXIDINE GLUCONATE SCH APPLIC: 213 SOLUTION TOPICAL at 21:20

## 2022-09-11 RX ADMIN — VASOPRESSIN SCH MLS/HR: 20 INJECTION INTRAVENOUS at 22:40

## 2022-09-11 RX ADMIN — IBUPROFEN PRN MG: 800 INJECTION INTRAVENOUS at 00:19

## 2022-09-11 RX ADMIN — MEROPENEM SCH MLS/HR: 1 INJECTION, POWDER, FOR SOLUTION INTRAVENOUS at 09:55

## 2022-09-11 RX ADMIN — HYDROCORTISONE SODIUM SUCCINATE SCH MG: 100 INJECTION, POWDER, FOR SOLUTION INTRAMUSCULAR; INTRAVENOUS at 11:44

## 2022-09-11 RX ADMIN — INSULIN ASPART SCH UNIT: 100 INJECTION, SOLUTION INTRAVENOUS; SUBCUTANEOUS at 21:19

## 2022-09-11 RX ADMIN — Medication SCH ML: at 11:44

## 2022-09-11 RX ADMIN — LEUCINE, PHENYLALANINE, LYSINE, METHIONINE, ISOLEUCINE, VALINE, HISTIDINE, THREONINE, TRYPTOPHAN, ALANINE, GLYCINE, ARGININE, PROLINE, SERINE, TYROSINE, DEXTROSE SCH MLS/HR: 311; 238; 247; 170; 255; 247; 204; 179; 77; 880; 438; 489; 289; 213; 17; 5 INJECTION INTRAVENOUS at 17:04

## 2022-09-11 RX ADMIN — PANTOPRAZOLE SODIUM SCH MG: 40 INJECTION, POWDER, FOR SOLUTION INTRAVENOUS at 09:57

## 2022-09-11 RX ADMIN — ROCURONIUM BROMIDE SCH MLS/HR: 10 INJECTION, SOLUTION INTRAVENOUS at 22:40

## 2022-09-11 RX ADMIN — FENTANYL CITRATE SCH MLS/HR: 0.05 INJECTION, SOLUTION INTRAMUSCULAR; INTRAVENOUS at 15:30

## 2022-09-11 RX ADMIN — CASPOFUNGIN ACETATE SCH MLS/HR: 5 INJECTION, POWDER, LYOPHILIZED, FOR SOLUTION INTRAVENOUS at 17:50

## 2022-09-11 RX ADMIN — INSULIN ASPART SCH UNIT: 100 INJECTION, SOLUTION INTRAVENOUS; SUBCUTANEOUS at 17:33

## 2022-09-11 RX ADMIN — INSULIN ASPART SCH UNIT: 100 INJECTION, SOLUTION INTRAVENOUS; SUBCUTANEOUS at 11:29

## 2022-09-11 RX ADMIN — VANCOMYCIN SCH MLS/HR: 1.5 INJECTION, SOLUTION INTRAVENOUS at 09:56

## 2022-09-11 RX ADMIN — POLYETHYLENE GLYCOL 3350 SCH: 17 POWDER, FOR SOLUTION ORAL at 09:57

## 2022-09-11 RX ADMIN — VASOPRESSIN SCH MLS/HR: 20 INJECTION INTRAVENOUS at 11:29

## 2022-09-11 RX ADMIN — ENOXAPARIN SODIUM SCH MG: 40 INJECTION SUBCUTANEOUS at 09:56

## 2022-09-11 RX ADMIN — HYDROCORTISONE SODIUM SUCCINATE SCH MG: 100 INJECTION, POWDER, FOR SOLUTION INTRAMUSCULAR; INTRAVENOUS at 17:34

## 2022-09-11 RX ADMIN — INSULIN ASPART SCH: 100 INJECTION, SOLUTION INTRAVENOUS; SUBCUTANEOUS at 06:16

## 2022-09-12 LAB
ALBUMIN SERPL-MCNC: 1.4 G/DL (ref 3.4–5)
ALP SERPL-CCNC: 83 U/L (ref 45–117)
ALT SERPL-CCNC: 24 U/L (ref 13–61)
ANION GAP SERPL CALC-SCNC: 14 MMOL/L (ref 8–16)
ANISOCYTOSIS BLD QL: (no result)
ARTERIAL PATENCY WRIST A: (no result)
ARTERIAL PATENCY WRIST A: POSITIVE
AST SERPL-CCNC: 51 U/L (ref 15–37)
BASE EXCESS BLDA CALC-SCNC: -13.6 MMOL/L (ref -2–2)
BASE EXCESS BLDA CALC-SCNC: -8.2 MMOL/L (ref -2–2)
BILIRUB SERPL-MCNC: 1.6 MG/DL (ref 0.2–1)
BREATHS.MECHANICAL ON VENT: 20
BREATHS.MECHANICAL ON VENT: 25
BUN SERPL-MCNC: 58.8 MG/DL (ref 7–18)
CALCIUM SERPL-MCNC: 6.3 MG/DL (ref 8.5–10.1)
CHLORIDE SERPL-SCNC: 91 MMOL/L (ref 98–107)
CO2 SERPL-SCNC: 19 MMOL/L (ref 21–32)
CREAT SERPL-MCNC: 1.8 MG/DL (ref 0.55–1.3)
DACRYOCYTES BLD QL SMEAR: (no result)
DEPRECATED RDW RBC AUTO: 17.5 % (ref 11.9–15.9)
GLUCOSE SERPL-MCNC: 257 MG/DL (ref 74–106)
HCT VFR BLD CALC: 36.6 % (ref 35.4–49)
HCT VFR BLDV CALC: 36 % (ref 35.4–49)
HCT VFR BLDV CALC: 42 % (ref 35.4–49)
HGB BLD-MCNC: 11.2 GM/DL (ref 11.7–16.9)
MACROCYTES BLD QL: 0
MAGNESIUM SERPL-MCNC: 2.1 MG/DL (ref 1.8–2.4)
MCH RBC QN AUTO: 25.2 PG (ref 25.7–33.7)
MCHC RBC AUTO-ENTMCNC: 30.7 G/DL (ref 32–35.9)
MCV RBC: 82.2 FL (ref 80–96)
OVALOCYTES BLD QL SMEAR: (no result)
PCO2 BLDA: 53 MMHG (ref 35–45)
PCO2 BLDA: 56.9 MMHG (ref 35–45)
PHOSPHATE SERPL-MCNC: 4.4 MG/DL (ref 2.5–4.9)
PLATELET # BLD AUTO: 458 10^3/UL (ref 134–434)
PMV BLD: 8.5 FL (ref 7.5–11.1)
PO2 BLDA: 75 MMHG (ref 80–100)
PO2 BLDA: 88.3 MMHG (ref 80–100)
PROT SERPL-MCNC: 5.6 G/DL (ref 6.4–8.2)
RBC # BLD AUTO: 4.45 M/MM3 (ref 4–5.6)
SAO2 % BLDA: 91.2 % (ref 95–98)
SAO2 % BLDA: 93 % (ref 95–98)
SODIUM SERPL-SCNC: 124 MMOL/L (ref 136–145)
TARGETS BLD QL SMEAR: (no result)
VENTILATION MODE VENT: (no result)
VENTILATION MODE VENT: (no result)
WBC # BLD AUTO: 28.6 K/MM3 (ref 4–10)

## 2022-09-12 RX ADMIN — FENTANYL CITRATE SCH MLS/HR: 0.05 INJECTION, SOLUTION INTRAMUSCULAR; INTRAVENOUS at 17:29

## 2022-09-12 RX ADMIN — CHLORHEXIDINE GLUCONATE SCH APPLIC: 213 SOLUTION TOPICAL at 21:28

## 2022-09-12 RX ADMIN — MEROPENEM SCH MLS/HR: 1 INJECTION, POWDER, FOR SOLUTION INTRAVENOUS at 17:18

## 2022-09-12 RX ADMIN — INSULIN ASPART SCH UNIT: 100 INJECTION, SOLUTION INTRAVENOUS; SUBCUTANEOUS at 21:28

## 2022-09-12 RX ADMIN — VASOPRESSIN SCH MLS/HR: 20 INJECTION INTRAVENOUS at 17:25

## 2022-09-12 RX ADMIN — POLYETHYLENE GLYCOL 3350 SCH: 17 POWDER, FOR SOLUTION ORAL at 09:23

## 2022-09-12 RX ADMIN — FENTANYL CITRATE SCH MLS/HR: 0.05 INJECTION, SOLUTION INTRAMUSCULAR; INTRAVENOUS at 20:15

## 2022-09-12 RX ADMIN — PANTOPRAZOLE SODIUM SCH MG: 40 INJECTION, POWDER, FOR SOLUTION INTRAVENOUS at 10:14

## 2022-09-12 RX ADMIN — SENNOSIDES SCH: 8.6 TABLET, FILM COATED ORAL at 09:23

## 2022-09-12 RX ADMIN — NOREPINEPHRINE BITARTRATE SCH MLS/HR: 1 INJECTION, SOLUTION, CONCENTRATE INTRAVENOUS at 15:24

## 2022-09-12 RX ADMIN — SENNOSIDES SCH: 8.6 TABLET, FILM COATED ORAL at 21:29

## 2022-09-12 RX ADMIN — HYDROCORTISONE SODIUM SUCCINATE SCH MG: 100 INJECTION, POWDER, FOR SOLUTION INTRAMUSCULAR; INTRAVENOUS at 18:24

## 2022-09-12 RX ADMIN — MEROPENEM SCH MLS/HR: 1 INJECTION, POWDER, FOR SOLUTION INTRAVENOUS at 09:22

## 2022-09-12 RX ADMIN — HYDROCORTISONE SODIUM SUCCINATE SCH MG: 100 INJECTION, POWDER, FOR SOLUTION INTRAMUSCULAR; INTRAVENOUS at 06:09

## 2022-09-12 RX ADMIN — HYDROCORTISONE SODIUM SUCCINATE SCH MG: 100 INJECTION, POWDER, FOR SOLUTION INTRAMUSCULAR; INTRAVENOUS at 17:20

## 2022-09-12 RX ADMIN — INSULIN ASPART SCH UNIT: 100 INJECTION, SOLUTION INTRAVENOUS; SUBCUTANEOUS at 06:09

## 2022-09-12 RX ADMIN — INSULIN ASPART SCH UNIT: 100 INJECTION, SOLUTION INTRAVENOUS; SUBCUTANEOUS at 17:33

## 2022-09-12 RX ADMIN — SODIUM CHLORIDE SCH MLS/HR: 9 INJECTION, SOLUTION INTRAVENOUS at 17:31

## 2022-09-12 RX ADMIN — COLLAGENASE SANTYL SCH APPLIC: 250 OINTMENT TOPICAL at 17:25

## 2022-09-12 RX ADMIN — HYDROCORTISONE SODIUM SUCCINATE SCH MG: 100 INJECTION, POWDER, FOR SOLUTION INTRAMUSCULAR; INTRAVENOUS at 00:00

## 2022-09-12 RX ADMIN — ENOXAPARIN SODIUM SCH MG: 40 INJECTION SUBCUTANEOUS at 09:21

## 2022-09-12 RX ADMIN — VANCOMYCIN SCH MLS/HR: 1.5 INJECTION, SOLUTION INTRAVENOUS at 10:05

## 2022-09-12 RX ADMIN — INSULIN ASPART SCH UNIT: 100 INJECTION, SOLUTION INTRAVENOUS; SUBCUTANEOUS at 12:19

## 2022-09-12 RX ADMIN — VANCOMYCIN SCH MLS/HR: 1.5 INJECTION, SOLUTION INTRAVENOUS at 21:29

## 2022-09-12 RX ADMIN — ROCURONIUM BROMIDE SCH MLS/HR: 10 INJECTION, SOLUTION INTRAVENOUS at 17:19

## 2022-09-12 RX ADMIN — IBUPROFEN PRN MG: 800 INJECTION INTRAVENOUS at 23:09

## 2022-09-12 RX ADMIN — LEUCINE, PHENYLALANINE, LYSINE, METHIONINE, ISOLEUCINE, VALINE, HISTIDINE, THREONINE, TRYPTOPHAN, ALANINE, GLYCINE, ARGININE, PROLINE, SERINE, TYROSINE, DEXTROSE SCH MLS/HR: 311; 238; 247; 170; 255; 247; 204; 179; 77; 880; 438; 489; 289; 213; 17; 5 INJECTION INTRAVENOUS at 09:27

## 2022-09-12 RX ADMIN — Medication SCH MLS/HR: at 17:38

## 2022-09-12 RX ADMIN — CASPOFUNGIN ACETATE SCH MLS/HR: 5 INJECTION, POWDER, LYOPHILIZED, FOR SOLUTION INTRAVENOUS at 18:23

## 2022-09-12 RX ADMIN — MEROPENEM SCH MLS/HR: 1 INJECTION, POWDER, FOR SOLUTION INTRAVENOUS at 01:02

## 2022-09-12 RX ADMIN — LEUCINE, PHENYLALANINE, LYSINE, METHIONINE, ISOLEUCINE, VALINE, HISTIDINE, THREONINE, TRYPTOPHAN, ALANINE, GLYCINE, ARGININE, PROLINE, SERINE, TYROSINE, DEXTROSE SCH: 311; 238; 247; 170; 255; 247; 204; 179; 77; 880; 438; 489; 289; 213; 17; 5 INJECTION INTRAVENOUS at 13:21

## 2022-09-13 LAB
ALBUMIN SERPL-MCNC: 1.5 G/DL (ref 3.4–5)
ALP SERPL-CCNC: 81 U/L (ref 45–117)
ALT SERPL-CCNC: 16 U/L (ref 13–61)
ANION GAP SERPL CALC-SCNC: 15 MMOL/L (ref 8–16)
ANISOCYTOSIS BLD QL: 0
AST SERPL-CCNC: 54 U/L (ref 15–37)
BILIRUB SERPL-MCNC: 1.9 MG/DL (ref 0.2–1)
BUN SERPL-MCNC: 70.3 MG/DL (ref 7–18)
CALCIUM SERPL-MCNC: 6 MG/DL (ref 8.5–10.1)
CHLORIDE SERPL-SCNC: 90 MMOL/L (ref 98–107)
CO2 SERPL-SCNC: 17 MMOL/L (ref 21–32)
CREAT SERPL-MCNC: 2.2 MG/DL (ref 0.55–1.3)
DEPRECATED RDW RBC AUTO: 17.4 % (ref 11.9–15.9)
GLUCOSE SERPL-MCNC: 245 MG/DL (ref 74–106)
HCT VFR BLD CALC: 34.3 % (ref 35.4–49)
HGB BLD-MCNC: 10.6 GM/DL (ref 11.7–16.9)
MACROCYTES BLD QL: 0
MAGNESIUM SERPL-MCNC: 2.1 MG/DL (ref 1.8–2.4)
MCH RBC QN AUTO: 25.2 PG (ref 25.7–33.7)
MCHC RBC AUTO-ENTMCNC: 31 G/DL (ref 32–35.9)
MCV RBC: 81.4 FL (ref 80–96)
PHOSPHATE SERPL-MCNC: 4.1 MG/DL (ref 2.5–4.9)
PLATELET # BLD AUTO: 413 10^3/UL (ref 134–434)
PMV BLD: 8.9 FL (ref 7.5–11.1)
PROT SERPL-MCNC: 5.2 G/DL (ref 6.4–8.2)
RBC # BLD AUTO: 4.22 M/MM3 (ref 4–5.6)
SODIUM SERPL-SCNC: 122 MMOL/L (ref 136–145)
WBC # BLD AUTO: 22.3 K/MM3 (ref 4–10)

## 2022-09-13 RX ADMIN — VASOPRESSIN SCH MLS/HR: 20 INJECTION INTRAVENOUS at 05:20

## 2022-09-13 RX ADMIN — INSULIN ASPART SCH UNIT: 100 INJECTION, SOLUTION INTRAVENOUS; SUBCUTANEOUS at 06:53

## 2022-09-13 RX ADMIN — VASOPRESSIN SCH MLS/HR: 20 INJECTION INTRAVENOUS at 17:58

## 2022-09-13 RX ADMIN — FENTANYL CITRATE SCH MLS/HR: 0.05 INJECTION, SOLUTION INTRAMUSCULAR; INTRAVENOUS at 18:52

## 2022-09-13 RX ADMIN — CHLORHEXIDINE GLUCONATE SCH APPLIC: 213 SOLUTION TOPICAL at 21:23

## 2022-09-13 RX ADMIN — INSULIN ASPART SCH UNIT: 100 INJECTION, SOLUTION INTRAVENOUS; SUBCUTANEOUS at 17:55

## 2022-09-13 RX ADMIN — HYDROCORTISONE SODIUM SUCCINATE SCH MG: 100 INJECTION, POWDER, FOR SOLUTION INTRAMUSCULAR; INTRAVENOUS at 05:11

## 2022-09-13 RX ADMIN — HYDROCORTISONE SODIUM SUCCINATE SCH MG: 100 INJECTION, POWDER, FOR SOLUTION INTRAMUSCULAR; INTRAVENOUS at 12:06

## 2022-09-13 RX ADMIN — Medication SCH MLS/HR: at 12:08

## 2022-09-13 RX ADMIN — INSULIN ASPART SCH: 100 INJECTION, SOLUTION INTRAVENOUS; SUBCUTANEOUS at 21:57

## 2022-09-13 RX ADMIN — ENOXAPARIN SODIUM SCH MG: 40 INJECTION SUBCUTANEOUS at 09:49

## 2022-09-13 RX ADMIN — INSULIN ASPART SCH UNIT: 100 INJECTION, SOLUTION INTRAVENOUS; SUBCUTANEOUS at 12:07

## 2022-09-13 RX ADMIN — HYDROCORTISONE SODIUM SUCCINATE SCH MG: 100 INJECTION, POWDER, FOR SOLUTION INTRAMUSCULAR; INTRAVENOUS at 00:06

## 2022-09-13 RX ADMIN — INSULIN DETEMIR SCH UNITS: 100 INJECTION, SOLUTION SUBCUTANEOUS at 21:57

## 2022-09-13 RX ADMIN — PANTOPRAZOLE SODIUM SCH MG: 40 INJECTION, POWDER, FOR SOLUTION INTRAVENOUS at 09:49

## 2022-09-13 RX ADMIN — COLLAGENASE SANTYL SCH APPLIC: 250 OINTMENT TOPICAL at 09:49

## 2022-09-13 RX ADMIN — SENNOSIDES SCH: 8.6 TABLET, FILM COATED ORAL at 09:49

## 2022-09-13 RX ADMIN — MEROPENEM SCH MLS/HR: 1 INJECTION, POWDER, FOR SOLUTION INTRAVENOUS at 01:19

## 2022-09-13 RX ADMIN — SODIUM CHLORIDE SCH MLS/HR: 9 INJECTION, SOLUTION INTRAVENOUS at 21:24

## 2022-09-13 RX ADMIN — FENTANYL CITRATE SCH MLS/HR: 0.05 INJECTION, SOLUTION INTRAMUSCULAR; INTRAVENOUS at 08:13

## 2022-09-13 RX ADMIN — SODIUM CHLORIDE SCH MLS/HR: 9 INJECTION, SOLUTION INTRAVENOUS at 15:40

## 2022-09-13 RX ADMIN — SENNOSIDES SCH: 8.6 TABLET, FILM COATED ORAL at 21:23

## 2022-09-13 RX ADMIN — VANCOMYCIN SCH MLS/HR: 1.5 INJECTION, SOLUTION INTRAVENOUS at 09:49

## 2022-09-13 RX ADMIN — HYDROCORTISONE SODIUM SUCCINATE SCH MG: 100 INJECTION, POWDER, FOR SOLUTION INTRAMUSCULAR; INTRAVENOUS at 17:58

## 2022-09-13 RX ADMIN — CASPOFUNGIN ACETATE SCH MLS/HR: 5 INJECTION, POWDER, LYOPHILIZED, FOR SOLUTION INTRAVENOUS at 17:56

## 2022-09-13 RX ADMIN — POLYETHYLENE GLYCOL 3350 SCH: 17 POWDER, FOR SOLUTION ORAL at 09:49

## 2022-09-13 RX ADMIN — MEROPENEM SCH MLS/HR: 1 INJECTION, POWDER, FOR SOLUTION INTRAVENOUS at 17:55

## 2022-09-13 RX ADMIN — NOREPINEPHRINE BITARTRATE SCH MLS/HR: 1 INJECTION, SOLUTION, CONCENTRATE INTRAVENOUS at 02:00

## 2022-09-13 RX ADMIN — MEROPENEM SCH MLS/HR: 1 INJECTION, POWDER, FOR SOLUTION INTRAVENOUS at 09:48

## 2022-09-14 VITALS — RESPIRATION RATE: 25 BRPM

## 2022-09-14 LAB
ALBUMIN SERPL-MCNC: 1.5 G/DL (ref 3.4–5)
ALP SERPL-CCNC: 108 U/L (ref 45–117)
ALT SERPL-CCNC: 15 U/L (ref 13–61)
ANION GAP SERPL CALC-SCNC: 19 MMOL/L (ref 8–16)
ANISOCYTOSIS BLD QL: 0
AST SERPL-CCNC: 68 U/L (ref 15–37)
BILIRUB SERPL-MCNC: 1.8 MG/DL (ref 0.2–1)
BUN SERPL-MCNC: 74.1 MG/DL (ref 7–18)
CALCIUM SERPL-MCNC: 6.2 MG/DL (ref 8.5–10.1)
CHLORIDE SERPL-SCNC: 90 MMOL/L (ref 98–107)
CO2 SERPL-SCNC: 16 MMOL/L (ref 21–32)
CREAT SERPL-MCNC: 2.4 MG/DL (ref 0.55–1.3)
DEPRECATED RDW RBC AUTO: 17 % (ref 11.9–15.9)
GLUCOSE SERPL-MCNC: 129 MG/DL (ref 74–106)
HCT VFR BLD CALC: 36 % (ref 35.4–49)
HGB BLD-MCNC: 11 GM/DL (ref 11.7–16.9)
MACROCYTES BLD QL: 0
MAGNESIUM SERPL-MCNC: 1.9 MG/DL (ref 1.8–2.4)
MCH RBC QN AUTO: 24.5 PG (ref 25.7–33.7)
MCHC RBC AUTO-ENTMCNC: 30.4 G/DL (ref 32–35.9)
MCV RBC: 80.7 FL (ref 80–96)
PHOSPHATE SERPL-MCNC: 5.2 MG/DL (ref 2.5–4.9)
PLATELET # BLD AUTO: 481 10^3/UL (ref 134–434)
PMV BLD: 9.3 FL (ref 7.5–11.1)
PROT SERPL-MCNC: 5.1 G/DL (ref 6.4–8.2)
RBC # BLD AUTO: 4.47 M/MM3 (ref 4–5.6)
SODIUM SERPL-SCNC: 125 MMOL/L (ref 136–145)
WBC # BLD AUTO: 25.4 K/MM3 (ref 4–10)

## 2022-09-14 RX ADMIN — FENTANYL CITRATE SCH MLS/HR: 0.05 INJECTION, SOLUTION INTRAMUSCULAR; INTRAVENOUS at 12:06

## 2022-09-14 RX ADMIN — MEROPENEM SCH MLS/HR: 1 INJECTION, POWDER, FOR SOLUTION INTRAVENOUS at 02:03

## 2022-09-14 RX ADMIN — INSULIN ASPART SCH: 100 INJECTION, SOLUTION INTRAVENOUS; SUBCUTANEOUS at 12:08

## 2022-09-14 RX ADMIN — VASOPRESSIN SCH: 20 INJECTION INTRAVENOUS at 18:09

## 2022-09-14 RX ADMIN — COLLAGENASE SANTYL SCH APPLIC: 250 OINTMENT TOPICAL at 10:40

## 2022-09-14 RX ADMIN — SENNOSIDES SCH: 8.6 TABLET, FILM COATED ORAL at 10:40

## 2022-09-14 RX ADMIN — CEFTAZIDIME, AVIBACTAM SCH MLS/HR: 2; .5 POWDER, FOR SOLUTION INTRAVENOUS at 10:34

## 2022-09-14 RX ADMIN — INSULIN ASPART SCH: 100 INJECTION, SOLUTION INTRAVENOUS; SUBCUTANEOUS at 06:50

## 2022-09-14 RX ADMIN — INSULIN DETEMIR SCH: 100 INJECTION, SOLUTION SUBCUTANEOUS at 06:57

## 2022-09-14 RX ADMIN — CEFTAZIDIME, AVIBACTAM SCH MLS/HR: 2; .5 POWDER, FOR SOLUTION INTRAVENOUS at 18:03

## 2022-09-14 RX ADMIN — PHENYLEPHRINE HYDROCHLORIDE SCH MLS/HR: 10 INJECTION INTRAVENOUS at 23:00

## 2022-09-14 RX ADMIN — SODIUM CHLORIDE SCH MLS/HR: 9 INJECTION, SOLUTION INTRAVENOUS at 11:07

## 2022-09-14 RX ADMIN — ENOXAPARIN SODIUM SCH MG: 40 INJECTION SUBCUTANEOUS at 10:39

## 2022-09-14 RX ADMIN — HYDROCORTISONE SODIUM SUCCINATE SCH MG: 100 INJECTION, POWDER, FOR SOLUTION INTRAMUSCULAR; INTRAVENOUS at 18:06

## 2022-09-14 RX ADMIN — SODIUM CHLORIDE SCH: 9 INJECTION, SOLUTION INTRAVENOUS at 18:09

## 2022-09-14 RX ADMIN — SODIUM CHLORIDE SCH MLS/HR: 9 INJECTION, SOLUTION INTRAVENOUS at 23:00

## 2022-09-14 RX ADMIN — CHLORHEXIDINE GLUCONATE SCH APPLIC: 213 SOLUTION TOPICAL at 21:24

## 2022-09-14 RX ADMIN — PHENYLEPHRINE HYDROCHLORIDE SCH MLS/HR: 10 INJECTION INTRAVENOUS at 07:30

## 2022-09-14 RX ADMIN — HYDROCORTISONE SODIUM SUCCINATE SCH MG: 100 INJECTION, POWDER, FOR SOLUTION INTRAMUSCULAR; INTRAVENOUS at 00:15

## 2022-09-14 RX ADMIN — CASPOFUNGIN ACETATE SCH MLS/HR: 5 INJECTION, POWDER, LYOPHILIZED, FOR SOLUTION INTRAVENOUS at 18:06

## 2022-09-14 RX ADMIN — SENNOSIDES SCH: 8.6 TABLET, FILM COATED ORAL at 21:24

## 2022-09-14 RX ADMIN — Medication SCH: at 10:32

## 2022-09-14 RX ADMIN — IBUPROFEN PRN MG: 800 INJECTION INTRAVENOUS at 11:17

## 2022-09-14 RX ADMIN — VASOPRESSIN SCH MLS/HR: 20 INJECTION INTRAVENOUS at 23:00

## 2022-09-14 RX ADMIN — INSULIN ASPART SCH: 100 INJECTION, SOLUTION INTRAVENOUS; SUBCUTANEOUS at 18:52

## 2022-09-14 RX ADMIN — INSULIN ASPART SCH: 100 INJECTION, SOLUTION INTRAVENOUS; SUBCUTANEOUS at 21:24

## 2022-09-14 RX ADMIN — VASOPRESSIN SCH MLS/HR: 20 INJECTION INTRAVENOUS at 10:09

## 2022-09-14 RX ADMIN — FENTANYL CITRATE SCH MLS/HR: 0.05 INJECTION, SOLUTION INTRAMUSCULAR; INTRAVENOUS at 06:16

## 2022-09-14 RX ADMIN — VASOPRESSIN SCH MLS/HR: 20 INJECTION INTRAVENOUS at 01:17

## 2022-09-14 RX ADMIN — PANTOPRAZOLE SODIUM SCH MG: 40 INJECTION, POWDER, FOR SOLUTION INTRAVENOUS at 10:39

## 2022-09-14 RX ADMIN — POLYETHYLENE GLYCOL 3350 SCH: 17 POWDER, FOR SOLUTION ORAL at 10:38

## 2022-09-14 RX ADMIN — HYDROCORTISONE SODIUM SUCCINATE SCH MG: 100 INJECTION, POWDER, FOR SOLUTION INTRAMUSCULAR; INTRAVENOUS at 06:11

## 2022-09-14 RX ADMIN — HYDROCORTISONE SODIUM SUCCINATE SCH MG: 100 INJECTION, POWDER, FOR SOLUTION INTRAMUSCULAR; INTRAVENOUS at 12:06

## 2022-09-15 VITALS — TEMPERATURE: 96.9 F | HEART RATE: 96 BPM | DIASTOLIC BLOOD PRESSURE: 43 MMHG | SYSTOLIC BLOOD PRESSURE: 73 MMHG

## 2022-09-15 LAB
ALBUMIN SERPL-MCNC: 1.4 G/DL (ref 3.4–5)
ALP SERPL-CCNC: 153 U/L (ref 45–117)
ALT SERPL-CCNC: 13 U/L (ref 13–61)
ANION GAP SERPL CALC-SCNC: 18 MMOL/L (ref 8–16)
ANISOCYTOSIS BLD QL: 0
AST SERPL-CCNC: 76 U/L (ref 15–37)
BILIRUB SERPL-MCNC: 1.9 MG/DL (ref 0.2–1)
BUN SERPL-MCNC: 75.8 MG/DL (ref 7–18)
CALCIUM SERPL-MCNC: 5.8 MG/DL (ref 8.5–10.1)
CHLORIDE SERPL-SCNC: 95 MMOL/L (ref 98–107)
CO2 SERPL-SCNC: 12 MMOL/L (ref 21–32)
CREAT SERPL-MCNC: 2.7 MG/DL (ref 0.55–1.3)
DEPRECATED RDW RBC AUTO: 17.5 % (ref 11.9–15.9)
GLUCOSE SERPL-MCNC: 166 MG/DL (ref 74–106)
HCT VFR BLD CALC: 35.2 % (ref 35.4–49)
HGB BLD-MCNC: 10.6 GM/DL (ref 11.7–16.9)
MACROCYTES BLD QL: 0
MAGNESIUM SERPL-MCNC: 1.8 MG/DL (ref 1.8–2.4)
MCH RBC QN AUTO: 24.8 PG (ref 25.7–33.7)
MCHC RBC AUTO-ENTMCNC: 30.2 G/DL (ref 32–35.9)
MCV RBC: 82.3 FL (ref 80–96)
OVALOCYTES BLD QL SMEAR: (no result)
PHOSPHATE SERPL-MCNC: 6 MG/DL (ref 2.5–4.9)
PLATELET # BLD AUTO: 483 10^3/UL (ref 134–434)
PMV BLD: 9.7 FL (ref 7.5–11.1)
PROT SERPL-MCNC: 4.8 G/DL (ref 6.4–8.2)
RBC # BLD AUTO: 4.28 M/MM3 (ref 4–5.6)
SODIUM SERPL-SCNC: 126 MMOL/L (ref 136–145)
WBC # BLD AUTO: 34.4 K/MM3 (ref 4–10)

## 2022-09-15 RX ADMIN — SENNOSIDES SCH: 8.6 TABLET, FILM COATED ORAL at 10:20

## 2022-09-15 RX ADMIN — FENTANYL CITRATE SCH MLS/HR: 0.05 INJECTION, SOLUTION INTRAMUSCULAR; INTRAVENOUS at 10:18

## 2022-09-15 RX ADMIN — POLYETHYLENE GLYCOL 3350 SCH: 17 POWDER, FOR SOLUTION ORAL at 10:18

## 2022-09-15 RX ADMIN — INSULIN ASPART SCH: 100 INJECTION, SOLUTION INTRAVENOUS; SUBCUTANEOUS at 06:05

## 2022-09-15 RX ADMIN — PHENYLEPHRINE HYDROCHLORIDE SCH: 10 INJECTION INTRAVENOUS at 10:18

## 2022-09-15 RX ADMIN — HYDROCORTISONE SODIUM SUCCINATE SCH MG: 100 INJECTION, POWDER, FOR SOLUTION INTRAMUSCULAR; INTRAVENOUS at 01:05

## 2022-09-15 RX ADMIN — PHENYLEPHRINE HYDROCHLORIDE SCH MLS/HR: 10 INJECTION INTRAVENOUS at 10:18

## 2022-09-15 RX ADMIN — SODIUM CHLORIDE SCH MLS/HR: 9 INJECTION, SOLUTION INTRAVENOUS at 10:20

## 2022-09-15 RX ADMIN — SODIUM CHLORIDE SCH: 9 INJECTION, SOLUTION INTRAVENOUS at 13:03

## 2022-09-15 RX ADMIN — PHENYLEPHRINE HYDROCHLORIDE SCH MLS/HR: 10 INJECTION INTRAVENOUS at 05:00

## 2022-09-15 RX ADMIN — VASOPRESSIN SCH MLS/HR: 20 INJECTION INTRAVENOUS at 03:00

## 2022-09-15 RX ADMIN — Medication SCH: at 10:19

## 2022-09-15 RX ADMIN — CEFTAZIDIME, AVIBACTAM SCH MLS/HR: 2; .5 POWDER, FOR SOLUTION INTRAVENOUS at 11:03

## 2022-09-15 RX ADMIN — COLLAGENASE SANTYL SCH APPLIC: 250 OINTMENT TOPICAL at 10:19

## 2022-09-15 RX ADMIN — HYDROCORTISONE SODIUM SUCCINATE SCH MG: 100 INJECTION, POWDER, FOR SOLUTION INTRAMUSCULAR; INTRAVENOUS at 05:54

## 2022-09-15 RX ADMIN — INSULIN ASPART SCH: 100 INJECTION, SOLUTION INTRAVENOUS; SUBCUTANEOUS at 13:03

## 2022-09-15 RX ADMIN — PANTOPRAZOLE SODIUM SCH MG: 40 INJECTION, POWDER, FOR SOLUTION INTRAVENOUS at 10:19

## 2022-09-15 RX ADMIN — Medication SCH MLS/HR: at 06:00

## 2022-09-15 RX ADMIN — ENOXAPARIN SODIUM SCH MG: 40 INJECTION SUBCUTANEOUS at 10:19

## 2022-09-15 RX ADMIN — HYDROCORTISONE SODIUM SUCCINATE SCH MG: 100 INJECTION, POWDER, FOR SOLUTION INTRAMUSCULAR; INTRAVENOUS at 13:04

## 2022-09-15 RX ADMIN — SODIUM CHLORIDE SCH MLS/HR: 9 INJECTION, SOLUTION INTRAVENOUS at 01:00

## 2022-09-15 RX ADMIN — CEFTAZIDIME, AVIBACTAM SCH MLS/HR: 2; .5 POWDER, FOR SOLUTION INTRAVENOUS at 01:06

## 2022-09-15 RX ADMIN — VASOPRESSIN SCH: 20 INJECTION INTRAVENOUS at 13:04

## 2022-09-15 RX ADMIN — FENTANYL CITRATE SCH MLS/HR: 0.05 INJECTION, SOLUTION INTRAMUSCULAR; INTRAVENOUS at 00:00
